# Patient Record
Sex: FEMALE | Race: WHITE | Employment: OTHER | URBAN - METROPOLITAN AREA
[De-identification: names, ages, dates, MRNs, and addresses within clinical notes are randomized per-mention and may not be internally consistent; named-entity substitution may affect disease eponyms.]

---

## 2017-01-04 RX ORDER — INSULIN LISPRO 100 [IU]/ML
INJECTION, SOLUTION INTRAVENOUS; SUBCUTANEOUS
Qty: 15 ML | Refills: 0 | Status: SHIPPED | OUTPATIENT
Start: 2017-01-04 | End: 2017-02-07 | Stop reason: SDUPTHER

## 2017-01-09 ENCOUNTER — TELEPHONE (OUTPATIENT)
Dept: INTERNAL MEDICINE | Age: 77
End: 2017-01-09

## 2017-01-10 ENCOUNTER — CARE COORDINATION (OUTPATIENT)
Dept: CARE COORDINATION | Age: 77
End: 2017-01-10

## 2017-01-13 RX ORDER — INSULIN GLARGINE 100 [IU]/ML
INJECTION, SOLUTION SUBCUTANEOUS
Qty: 10 ML | Refills: 0 | Status: SHIPPED | OUTPATIENT
Start: 2017-01-13 | End: 2017-02-07 | Stop reason: SDUPTHER

## 2017-01-16 ENCOUNTER — OFFICE VISIT (OUTPATIENT)
Dept: INTERNAL MEDICINE | Age: 77
End: 2017-01-16

## 2017-01-16 ENCOUNTER — CARE COORDINATOR VISIT (OUTPATIENT)
Dept: CARE COORDINATION | Age: 77
End: 2017-01-16

## 2017-01-16 VITALS
HEART RATE: 88 BPM | SYSTOLIC BLOOD PRESSURE: 130 MMHG | DIASTOLIC BLOOD PRESSURE: 74 MMHG | TEMPERATURE: 97.9 F | WEIGHT: 182 LBS | BODY MASS INDEX: 29.25 KG/M2 | OXYGEN SATURATION: 98 % | HEIGHT: 66 IN

## 2017-01-16 DIAGNOSIS — L02.11 NECK ABSCESS: ICD-10-CM

## 2017-01-16 PROCEDURE — 99213 OFFICE O/P EST LOW 20 MIN: CPT | Performed by: INTERNAL MEDICINE

## 2017-01-16 PROCEDURE — G8484 FLU IMMUNIZE NO ADMIN: HCPCS | Performed by: INTERNAL MEDICINE

## 2017-01-16 PROCEDURE — G8427 DOCREV CUR MEDS BY ELIG CLIN: HCPCS | Performed by: INTERNAL MEDICINE

## 2017-01-16 PROCEDURE — 1123F ACP DISCUSS/DSCN MKR DOCD: CPT | Performed by: INTERNAL MEDICINE

## 2017-01-16 PROCEDURE — 4040F PNEUMOC VAC/ADMIN/RCVD: CPT | Performed by: INTERNAL MEDICINE

## 2017-01-16 PROCEDURE — G8420 CALC BMI NORM PARAMETERS: HCPCS | Performed by: INTERNAL MEDICINE

## 2017-01-16 PROCEDURE — G8400 PT W/DXA NO RESULTS DOC: HCPCS | Performed by: INTERNAL MEDICINE

## 2017-01-16 PROCEDURE — 1036F TOBACCO NON-USER: CPT | Performed by: INTERNAL MEDICINE

## 2017-01-16 PROCEDURE — 1090F PRES/ABSN URINE INCON ASSESS: CPT | Performed by: INTERNAL MEDICINE

## 2017-01-16 RX ORDER — LISINOPRIL 20 MG/1
TABLET ORAL
Qty: 30 TABLET | Refills: 2 | Status: SHIPPED | OUTPATIENT
Start: 2017-01-16 | End: 2017-04-16 | Stop reason: SDUPTHER

## 2017-01-16 ASSESSMENT — ENCOUNTER SYMPTOMS
CHEST TIGHTNESS: 0
WHEEZING: 0
GASTROINTESTINAL NEGATIVE: 1
SHORTNESS OF BREATH: 0
RESPIRATORY NEGATIVE: 1

## 2017-01-24 ENCOUNTER — CARE COORDINATION (OUTPATIENT)
Dept: CARE COORDINATION | Age: 77
End: 2017-01-24

## 2017-01-31 ENCOUNTER — CARE COORDINATION (OUTPATIENT)
Dept: CARE COORDINATION | Age: 77
End: 2017-01-31

## 2017-02-07 RX ORDER — INSULIN LISPRO 100 [IU]/ML
INJECTION, SOLUTION INTRAVENOUS; SUBCUTANEOUS
Qty: 15 ML | Refills: 0 | Status: ON HOLD | OUTPATIENT
Start: 2017-02-07 | End: 2017-02-23

## 2017-02-07 RX ORDER — INSULIN GLARGINE 100 [IU]/ML
INJECTION, SOLUTION SUBCUTANEOUS
Qty: 10 ML | Refills: 0 | Status: ON HOLD | OUTPATIENT
Start: 2017-02-07 | End: 2017-02-23

## 2017-02-13 ENCOUNTER — TELEPHONE (OUTPATIENT)
Dept: INTERNAL MEDICINE | Age: 77
End: 2017-02-13

## 2017-02-16 ENCOUNTER — TELEPHONE (OUTPATIENT)
Dept: INTERNAL MEDICINE | Age: 77
End: 2017-02-16

## 2017-02-20 ENCOUNTER — TELEPHONE (OUTPATIENT)
Dept: INTERNAL MEDICINE | Age: 77
End: 2017-02-20

## 2017-02-22 PROBLEM — E11.10 DKA (DIABETIC KETOACIDOSES): Status: ACTIVE | Noted: 2017-02-22

## 2017-03-01 ENCOUNTER — CARE COORDINATION (OUTPATIENT)
Dept: OTHER | Facility: CLINIC | Age: 77
End: 2017-03-01

## 2017-03-02 RX ORDER — INSULIN GLARGINE 100 [IU]/ML
INJECTION, SOLUTION SUBCUTANEOUS
Qty: 10 ML | Refills: 0 | Status: SHIPPED | OUTPATIENT
Start: 2017-03-02 | End: 2017-03-24 | Stop reason: SDUPTHER

## 2017-03-07 RX ORDER — INSULIN LISPRO 100 [IU]/ML
INJECTION, SOLUTION INTRAVENOUS; SUBCUTANEOUS
Qty: 15 ML | Refills: 0 | Status: SHIPPED | OUTPATIENT
Start: 2017-03-07 | End: 2017-04-03 | Stop reason: SDUPTHER

## 2017-03-09 ENCOUNTER — TELEPHONE (OUTPATIENT)
Dept: INTERNAL MEDICINE | Age: 77
End: 2017-03-09

## 2017-03-09 ENCOUNTER — CARE COORDINATION (OUTPATIENT)
Dept: CASE MANAGEMENT | Age: 77
End: 2017-03-09

## 2017-03-10 ENCOUNTER — CARE COORDINATION (OUTPATIENT)
Dept: CASE MANAGEMENT | Age: 77
End: 2017-03-10

## 2017-03-10 ENCOUNTER — TELEPHONE (OUTPATIENT)
Dept: INTERNAL MEDICINE | Age: 77
End: 2017-03-10

## 2017-03-13 ENCOUNTER — CARE COORDINATION (OUTPATIENT)
Dept: CASE MANAGEMENT | Age: 77
End: 2017-03-13

## 2017-03-16 ENCOUNTER — OFFICE VISIT (OUTPATIENT)
Dept: INTERNAL MEDICINE | Age: 77
End: 2017-03-16

## 2017-03-16 VITALS
OXYGEN SATURATION: 98 % | BODY MASS INDEX: 29.54 KG/M2 | WEIGHT: 183 LBS | SYSTOLIC BLOOD PRESSURE: 122 MMHG | DIASTOLIC BLOOD PRESSURE: 80 MMHG | HEART RATE: 97 BPM

## 2017-03-16 DIAGNOSIS — E03.4 HYPOTHYROIDISM DUE TO ACQUIRED ATROPHY OF THYROID: ICD-10-CM

## 2017-03-16 DIAGNOSIS — E11.9 TYPE 2 DIABETES MELLITUS WITHOUT COMPLICATION, WITH LONG-TERM CURRENT USE OF INSULIN (HCC): ICD-10-CM

## 2017-03-16 DIAGNOSIS — Z79.4 TYPE 2 DIABETES MELLITUS WITHOUT COMPLICATION, WITH LONG-TERM CURRENT USE OF INSULIN (HCC): ICD-10-CM

## 2017-03-16 DIAGNOSIS — I10 ESSENTIAL HYPERTENSION: ICD-10-CM

## 2017-03-16 PROBLEM — E11.10 DKA (DIABETIC KETOACIDOSES): Status: RESOLVED | Noted: 2017-02-22 | Resolved: 2017-03-16

## 2017-03-16 PROBLEM — L02.11 NECK ABSCESS: Status: RESOLVED | Noted: 2017-01-16 | Resolved: 2017-03-16

## 2017-03-16 PROCEDURE — 1123F ACP DISCUSS/DSCN MKR DOCD: CPT | Performed by: INTERNAL MEDICINE

## 2017-03-16 PROCEDURE — 1111F DSCHRG MED/CURRENT MED MERGE: CPT | Performed by: INTERNAL MEDICINE

## 2017-03-16 PROCEDURE — G8400 PT W/DXA NO RESULTS DOC: HCPCS | Performed by: INTERNAL MEDICINE

## 2017-03-16 PROCEDURE — 4040F PNEUMOC VAC/ADMIN/RCVD: CPT | Performed by: INTERNAL MEDICINE

## 2017-03-16 PROCEDURE — G8420 CALC BMI NORM PARAMETERS: HCPCS | Performed by: INTERNAL MEDICINE

## 2017-03-16 PROCEDURE — 1036F TOBACCO NON-USER: CPT | Performed by: INTERNAL MEDICINE

## 2017-03-16 PROCEDURE — 1090F PRES/ABSN URINE INCON ASSESS: CPT | Performed by: INTERNAL MEDICINE

## 2017-03-16 PROCEDURE — 99214 OFFICE O/P EST MOD 30 MIN: CPT | Performed by: INTERNAL MEDICINE

## 2017-03-16 PROCEDURE — G8427 DOCREV CUR MEDS BY ELIG CLIN: HCPCS | Performed by: INTERNAL MEDICINE

## 2017-03-16 PROCEDURE — G8484 FLU IMMUNIZE NO ADMIN: HCPCS | Performed by: INTERNAL MEDICINE

## 2017-03-16 ASSESSMENT — ENCOUNTER SYMPTOMS
CHEST TIGHTNESS: 0
RESPIRATORY NEGATIVE: 1
SHORTNESS OF BREATH: 0
WHEEZING: 0
GASTROINTESTINAL NEGATIVE: 1

## 2017-03-21 ENCOUNTER — CARE COORDINATION (OUTPATIENT)
Dept: CARE COORDINATION | Age: 77
End: 2017-03-21

## 2017-03-21 ENCOUNTER — CARE COORDINATION (OUTPATIENT)
Dept: CASE MANAGEMENT | Age: 77
End: 2017-03-21

## 2017-03-24 RX ORDER — OMEPRAZOLE 40 MG/1
CAPSULE, DELAYED RELEASE ORAL
Qty: 180 CAPSULE | Refills: 0 | Status: SHIPPED | OUTPATIENT
Start: 2017-03-24 | End: 2017-06-19 | Stop reason: SDUPTHER

## 2017-03-24 RX ORDER — INSULIN GLARGINE 100 [IU]/ML
INJECTION, SOLUTION SUBCUTANEOUS
Qty: 10 ML | Refills: 0 | Status: SHIPPED | OUTPATIENT
Start: 2017-03-24 | End: 2017-04-15 | Stop reason: SDUPTHER

## 2017-03-28 ENCOUNTER — CARE COORDINATION (OUTPATIENT)
Dept: CARE COORDINATION | Age: 77
End: 2017-03-28

## 2017-03-31 ENCOUNTER — TELEPHONE (OUTPATIENT)
Dept: INTERNAL MEDICINE | Age: 77
End: 2017-03-31

## 2017-04-03 ENCOUNTER — CARE COORDINATOR VISIT (OUTPATIENT)
Dept: CARE COORDINATION | Age: 77
End: 2017-04-03

## 2017-04-03 RX ORDER — INSULIN LISPRO 100 [IU]/ML
INJECTION, SOLUTION INTRAVENOUS; SUBCUTANEOUS
Qty: 15 ML | Refills: 0 | Status: SHIPPED | OUTPATIENT
Start: 2017-04-03 | End: 2017-05-02 | Stop reason: SDUPTHER

## 2017-04-11 ENCOUNTER — CARE COORDINATION (OUTPATIENT)
Dept: CARE COORDINATION | Age: 77
End: 2017-04-11

## 2017-04-17 ENCOUNTER — TELEPHONE (OUTPATIENT)
Dept: INTERNAL MEDICINE | Age: 77
End: 2017-04-17

## 2017-04-17 ENCOUNTER — OFFICE VISIT (OUTPATIENT)
Dept: INTERNAL MEDICINE | Age: 77
End: 2017-04-17

## 2017-04-17 VITALS
WEIGHT: 186 LBS | SYSTOLIC BLOOD PRESSURE: 132 MMHG | BODY MASS INDEX: 30.02 KG/M2 | DIASTOLIC BLOOD PRESSURE: 74 MMHG | HEART RATE: 74 BPM | OXYGEN SATURATION: 98 %

## 2017-04-17 DIAGNOSIS — I10 ESSENTIAL HYPERTENSION: ICD-10-CM

## 2017-04-17 DIAGNOSIS — E03.4 HYPOTHYROIDISM DUE TO ACQUIRED ATROPHY OF THYROID: ICD-10-CM

## 2017-04-17 DIAGNOSIS — E11.9 TYPE 2 DIABETES MELLITUS WITHOUT COMPLICATION, WITH LONG-TERM CURRENT USE OF INSULIN (HCC): ICD-10-CM

## 2017-04-17 DIAGNOSIS — R29.6 RECURRENT FALLS: ICD-10-CM

## 2017-04-17 DIAGNOSIS — E78.2 MIXED HYPERLIPIDEMIA: ICD-10-CM

## 2017-04-17 DIAGNOSIS — Z79.4 TYPE 2 DIABETES MELLITUS WITHOUT COMPLICATION, WITH LONG-TERM CURRENT USE OF INSULIN (HCC): ICD-10-CM

## 2017-04-17 PROCEDURE — G8400 PT W/DXA NO RESULTS DOC: HCPCS | Performed by: INTERNAL MEDICINE

## 2017-04-17 PROCEDURE — 99214 OFFICE O/P EST MOD 30 MIN: CPT | Performed by: INTERNAL MEDICINE

## 2017-04-17 PROCEDURE — G8417 CALC BMI ABV UP PARAM F/U: HCPCS | Performed by: INTERNAL MEDICINE

## 2017-04-17 PROCEDURE — 4040F PNEUMOC VAC/ADMIN/RCVD: CPT | Performed by: INTERNAL MEDICINE

## 2017-04-17 PROCEDURE — 1090F PRES/ABSN URINE INCON ASSESS: CPT | Performed by: INTERNAL MEDICINE

## 2017-04-17 PROCEDURE — 1036F TOBACCO NON-USER: CPT | Performed by: INTERNAL MEDICINE

## 2017-04-17 PROCEDURE — G8427 DOCREV CUR MEDS BY ELIG CLIN: HCPCS | Performed by: INTERNAL MEDICINE

## 2017-04-17 PROCEDURE — 1123F ACP DISCUSS/DSCN MKR DOCD: CPT | Performed by: INTERNAL MEDICINE

## 2017-04-17 RX ORDER — LISINOPRIL 20 MG/1
TABLET ORAL
Qty: 30 TABLET | Refills: 0 | Status: SHIPPED | OUTPATIENT
Start: 2017-04-17 | End: 2017-05-14 | Stop reason: SDUPTHER

## 2017-04-17 RX ORDER — INSULIN GLARGINE 100 [IU]/ML
INJECTION, SOLUTION SUBCUTANEOUS
Qty: 10 ML | Refills: 0 | Status: SHIPPED | OUTPATIENT
Start: 2017-04-17 | End: 2017-05-09 | Stop reason: SDUPTHER

## 2017-04-17 RX ORDER — CITALOPRAM 40 MG/1
TABLET ORAL
Qty: 30 TABLET | Refills: 3 | Status: SHIPPED | OUTPATIENT
Start: 2017-04-17 | End: 2017-04-17

## 2017-04-17 RX ORDER — CITALOPRAM 40 MG/1
TABLET ORAL
Qty: 30 TABLET | Refills: 0 | Status: SHIPPED | OUTPATIENT
Start: 2017-04-17 | End: 2017-07-19 | Stop reason: SDUPTHER

## 2017-04-17 ASSESSMENT — ENCOUNTER SYMPTOMS
RESPIRATORY NEGATIVE: 1
SHORTNESS OF BREATH: 0
WHEEZING: 0
CHEST TIGHTNESS: 0
GASTROINTESTINAL NEGATIVE: 1

## 2017-04-25 ENCOUNTER — CARE COORDINATION (OUTPATIENT)
Dept: CARE COORDINATION | Age: 77
End: 2017-04-25

## 2017-05-02 RX ORDER — INSULIN LISPRO 100 [IU]/ML
INJECTION, SOLUTION INTRAVENOUS; SUBCUTANEOUS
Qty: 15 ML | Refills: 0 | Status: SHIPPED | OUTPATIENT
Start: 2017-05-02 | End: 2017-05-29 | Stop reason: SDUPTHER

## 2017-05-08 RX ORDER — PRAVASTATIN SODIUM 80 MG/1
TABLET ORAL
Qty: 90 TABLET | Refills: 0 | Status: SHIPPED | OUTPATIENT
Start: 2017-05-08 | End: 2018-02-01 | Stop reason: SDUPTHER

## 2017-05-09 RX ORDER — INSULIN GLARGINE 100 [IU]/ML
INJECTION, SOLUTION SUBCUTANEOUS
Qty: 10 ML | Refills: 0 | Status: SHIPPED | OUTPATIENT
Start: 2017-05-09 | End: 2017-06-03 | Stop reason: SDUPTHER

## 2017-05-15 RX ORDER — LISINOPRIL 20 MG/1
TABLET ORAL
Qty: 30 TABLET | Refills: 2 | Status: SHIPPED | OUTPATIENT
Start: 2017-05-15 | End: 2017-12-21 | Stop reason: SDUPTHER

## 2017-05-17 ENCOUNTER — OFFICE VISIT (OUTPATIENT)
Dept: INTERNAL MEDICINE | Age: 77
End: 2017-05-17

## 2017-05-17 ENCOUNTER — CARE COORDINATOR VISIT (OUTPATIENT)
Dept: CARE COORDINATION | Age: 77
End: 2017-05-17

## 2017-05-17 VITALS
DIASTOLIC BLOOD PRESSURE: 70 MMHG | WEIGHT: 184 LBS | HEART RATE: 84 BPM | BODY MASS INDEX: 29.7 KG/M2 | OXYGEN SATURATION: 96 % | SYSTOLIC BLOOD PRESSURE: 136 MMHG

## 2017-05-17 DIAGNOSIS — F34.1 DYSTHYMIA: ICD-10-CM

## 2017-05-17 DIAGNOSIS — E03.4 HYPOTHYROIDISM DUE TO ACQUIRED ATROPHY OF THYROID: ICD-10-CM

## 2017-05-17 DIAGNOSIS — I10 ESSENTIAL HYPERTENSION: ICD-10-CM

## 2017-05-17 DIAGNOSIS — Z79.4 TYPE 2 DIABETES MELLITUS WITHOUT COMPLICATION, WITH LONG-TERM CURRENT USE OF INSULIN (HCC): ICD-10-CM

## 2017-05-17 DIAGNOSIS — E11.9 TYPE 2 DIABETES MELLITUS WITHOUT COMPLICATION, WITH LONG-TERM CURRENT USE OF INSULIN (HCC): ICD-10-CM

## 2017-05-17 LAB — HBA1C MFR BLD: 9.5 %

## 2017-05-17 PROCEDURE — 4040F PNEUMOC VAC/ADMIN/RCVD: CPT | Performed by: INTERNAL MEDICINE

## 2017-05-17 PROCEDURE — G8420 CALC BMI NORM PARAMETERS: HCPCS | Performed by: INTERNAL MEDICINE

## 2017-05-17 PROCEDURE — G8427 DOCREV CUR MEDS BY ELIG CLIN: HCPCS | Performed by: INTERNAL MEDICINE

## 2017-05-17 PROCEDURE — 1123F ACP DISCUSS/DSCN MKR DOCD: CPT | Performed by: INTERNAL MEDICINE

## 2017-05-17 PROCEDURE — 1090F PRES/ABSN URINE INCON ASSESS: CPT | Performed by: INTERNAL MEDICINE

## 2017-05-17 PROCEDURE — 99214 OFFICE O/P EST MOD 30 MIN: CPT | Performed by: INTERNAL MEDICINE

## 2017-05-17 PROCEDURE — G8400 PT W/DXA NO RESULTS DOC: HCPCS | Performed by: INTERNAL MEDICINE

## 2017-05-17 PROCEDURE — 1036F TOBACCO NON-USER: CPT | Performed by: INTERNAL MEDICINE

## 2017-05-17 PROCEDURE — 83036 HEMOGLOBIN GLYCOSYLATED A1C: CPT | Performed by: INTERNAL MEDICINE

## 2017-05-17 ASSESSMENT — ENCOUNTER SYMPTOMS
CHEST TIGHTNESS: 0
RESPIRATORY NEGATIVE: 1
GASTROINTESTINAL NEGATIVE: 1
SHORTNESS OF BREATH: 0
WHEEZING: 0

## 2017-05-30 RX ORDER — INSULIN LISPRO 100 [IU]/ML
INJECTION, SOLUTION INTRAVENOUS; SUBCUTANEOUS
Qty: 15 ML | Refills: 0 | Status: SHIPPED | OUTPATIENT
Start: 2017-05-30 | End: 2017-06-27 | Stop reason: SDUPTHER

## 2017-06-05 RX ORDER — INSULIN GLARGINE 100 [IU]/ML
INJECTION, SOLUTION SUBCUTANEOUS
Qty: 10 ML | Refills: 0 | Status: SHIPPED | OUTPATIENT
Start: 2017-06-05 | End: 2017-06-27 | Stop reason: SDUPTHER

## 2017-06-19 RX ORDER — OMEPRAZOLE 40 MG/1
CAPSULE, DELAYED RELEASE ORAL
Qty: 180 CAPSULE | Refills: 0 | Status: SHIPPED | OUTPATIENT
Start: 2017-06-19 | End: 2017-11-21 | Stop reason: CLARIF

## 2017-06-27 RX ORDER — INSULIN LISPRO 100 [IU]/ML
INJECTION, SOLUTION INTRAVENOUS; SUBCUTANEOUS
Qty: 15 ML | Refills: 0 | Status: SHIPPED | OUTPATIENT
Start: 2017-06-27 | End: 2017-11-21 | Stop reason: CLARIF

## 2017-06-27 RX ORDER — INSULIN GLARGINE 100 [IU]/ML
INJECTION, SOLUTION SUBCUTANEOUS
Qty: 10 ML | Refills: 0 | Status: SHIPPED | OUTPATIENT
Start: 2017-06-27 | End: 2017-07-19 | Stop reason: DRUGHIGH

## 2017-07-03 ENCOUNTER — CARE COORDINATION (OUTPATIENT)
Dept: CARE COORDINATION | Age: 77
End: 2017-07-03

## 2017-07-19 ENCOUNTER — OFFICE VISIT (OUTPATIENT)
Dept: INTERNAL MEDICINE | Age: 77
End: 2017-07-19

## 2017-07-19 VITALS
BODY MASS INDEX: 31.31 KG/M2 | DIASTOLIC BLOOD PRESSURE: 72 MMHG | WEIGHT: 194 LBS | SYSTOLIC BLOOD PRESSURE: 130 MMHG | OXYGEN SATURATION: 97 % | HEART RATE: 72 BPM

## 2017-07-19 DIAGNOSIS — I10 ESSENTIAL HYPERTENSION: ICD-10-CM

## 2017-07-19 DIAGNOSIS — Z79.4 TYPE 2 DIABETES MELLITUS WITHOUT COMPLICATION, WITH LONG-TERM CURRENT USE OF INSULIN (HCC): Primary | ICD-10-CM

## 2017-07-19 DIAGNOSIS — E78.2 MIXED HYPERLIPIDEMIA: ICD-10-CM

## 2017-07-19 DIAGNOSIS — E11.9 TYPE 2 DIABETES MELLITUS WITHOUT COMPLICATION, WITH LONG-TERM CURRENT USE OF INSULIN (HCC): Primary | ICD-10-CM

## 2017-07-19 DIAGNOSIS — E03.4 HYPOTHYROIDISM DUE TO ACQUIRED ATROPHY OF THYROID: ICD-10-CM

## 2017-07-19 LAB — HBA1C MFR BLD: 11.5 %

## 2017-07-19 PROCEDURE — 1036F TOBACCO NON-USER: CPT | Performed by: INTERNAL MEDICINE

## 2017-07-19 PROCEDURE — 1123F ACP DISCUSS/DSCN MKR DOCD: CPT | Performed by: INTERNAL MEDICINE

## 2017-07-19 PROCEDURE — 1090F PRES/ABSN URINE INCON ASSESS: CPT | Performed by: INTERNAL MEDICINE

## 2017-07-19 PROCEDURE — G8417 CALC BMI ABV UP PARAM F/U: HCPCS | Performed by: INTERNAL MEDICINE

## 2017-07-19 PROCEDURE — G8427 DOCREV CUR MEDS BY ELIG CLIN: HCPCS | Performed by: INTERNAL MEDICINE

## 2017-07-19 PROCEDURE — 4040F PNEUMOC VAC/ADMIN/RCVD: CPT | Performed by: INTERNAL MEDICINE

## 2017-07-19 PROCEDURE — G8400 PT W/DXA NO RESULTS DOC: HCPCS | Performed by: INTERNAL MEDICINE

## 2017-07-19 PROCEDURE — 83036 HEMOGLOBIN GLYCOSYLATED A1C: CPT | Performed by: INTERNAL MEDICINE

## 2017-07-19 PROCEDURE — 99214 OFFICE O/P EST MOD 30 MIN: CPT | Performed by: INTERNAL MEDICINE

## 2017-07-19 RX ORDER — CITALOPRAM 40 MG/1
TABLET ORAL
Qty: 30 TABLET | Refills: 3 | Status: SHIPPED | OUTPATIENT
Start: 2017-07-19 | End: 2017-09-19 | Stop reason: ALTCHOICE

## 2017-07-19 RX ORDER — INSULIN GLARGINE 100 [IU]/ML
INJECTION, SOLUTION SUBCUTANEOUS
Qty: 10 ML | Refills: 0 | Status: SHIPPED | OUTPATIENT
Start: 2017-07-19 | End: 2018-02-08 | Stop reason: SDUPTHER

## 2017-07-19 ASSESSMENT — ENCOUNTER SYMPTOMS
CHEST TIGHTNESS: 0
RESPIRATORY NEGATIVE: 1
SHORTNESS OF BREATH: 0
GASTROINTESTINAL NEGATIVE: 1
WHEEZING: 0

## 2017-07-20 ENCOUNTER — CARE COORDINATION (OUTPATIENT)
Dept: CARE COORDINATION | Age: 77
End: 2017-07-20

## 2017-08-02 ENCOUNTER — TELEPHONE (OUTPATIENT)
Dept: INTERNAL MEDICINE | Age: 77
End: 2017-08-02

## 2017-08-03 ENCOUNTER — TELEPHONE (OUTPATIENT)
Dept: INTERNAL MEDICINE | Age: 77
End: 2017-08-03

## 2017-08-22 ENCOUNTER — CARE COORDINATION (OUTPATIENT)
Dept: CARE COORDINATION | Age: 77
End: 2017-08-22

## 2017-08-30 ENCOUNTER — TELEPHONE (OUTPATIENT)
Dept: INTERNAL MEDICINE | Age: 77
End: 2017-08-30

## 2017-09-06 ENCOUNTER — CARE COORDINATION (OUTPATIENT)
Dept: CARE COORDINATION | Age: 77
End: 2017-09-06

## 2017-09-11 ENCOUNTER — OFFICE VISIT (OUTPATIENT)
Dept: INTERNAL MEDICINE | Age: 77
End: 2017-09-11

## 2017-09-11 VITALS
HEART RATE: 90 BPM | BODY MASS INDEX: 30.37 KG/M2 | WEIGHT: 189 LBS | DIASTOLIC BLOOD PRESSURE: 68 MMHG | TEMPERATURE: 97.8 F | HEIGHT: 66 IN | SYSTOLIC BLOOD PRESSURE: 130 MMHG | OXYGEN SATURATION: 96 %

## 2017-09-11 DIAGNOSIS — N39.0 URINARY TRACT INFECTION WITHOUT HEMATURIA, SITE UNSPECIFIED: Primary | ICD-10-CM

## 2017-09-11 DIAGNOSIS — I10 ESSENTIAL HYPERTENSION: ICD-10-CM

## 2017-09-11 DIAGNOSIS — E03.4 HYPOTHYROIDISM DUE TO ACQUIRED ATROPHY OF THYROID: ICD-10-CM

## 2017-09-11 DIAGNOSIS — N30.00 ACUTE CYSTITIS WITHOUT HEMATURIA: ICD-10-CM

## 2017-09-11 LAB
BILIRUBIN, POC: NORMAL
BLOOD URINE, POC: NORMAL
CLARITY, POC: CLEAR
COLOR, POC: YELLOW
GLUCOSE URINE, POC: NORMAL
KETONES, POC: NORMAL
LEUKOCYTE EST, POC: NORMAL
NITRITE, POC: NORMAL
PH, POC: 8
PROTEIN, POC: NORMAL
SPECIFIC GRAVITY, POC: 1
UROBILINOGEN, POC: NORMAL

## 2017-09-11 PROCEDURE — 81002 URINALYSIS NONAUTO W/O SCOPE: CPT | Performed by: INTERNAL MEDICINE

## 2017-09-11 PROCEDURE — 1090F PRES/ABSN URINE INCON ASSESS: CPT | Performed by: INTERNAL MEDICINE

## 2017-09-11 PROCEDURE — G8427 DOCREV CUR MEDS BY ELIG CLIN: HCPCS | Performed by: INTERNAL MEDICINE

## 2017-09-11 PROCEDURE — 99213 OFFICE O/P EST LOW 20 MIN: CPT | Performed by: INTERNAL MEDICINE

## 2017-09-11 PROCEDURE — 4040F PNEUMOC VAC/ADMIN/RCVD: CPT | Performed by: INTERNAL MEDICINE

## 2017-09-11 PROCEDURE — 1123F ACP DISCUSS/DSCN MKR DOCD: CPT | Performed by: INTERNAL MEDICINE

## 2017-09-11 PROCEDURE — 1036F TOBACCO NON-USER: CPT | Performed by: INTERNAL MEDICINE

## 2017-09-11 PROCEDURE — G8400 PT W/DXA NO RESULTS DOC: HCPCS | Performed by: INTERNAL MEDICINE

## 2017-09-11 PROCEDURE — G8417 CALC BMI ABV UP PARAM F/U: HCPCS | Performed by: INTERNAL MEDICINE

## 2017-09-11 RX ORDER — SULFAMETHOXAZOLE AND TRIMETHOPRIM 800; 160 MG/1; MG/1
1 TABLET ORAL 2 TIMES DAILY
Qty: 14 TABLET | Refills: 0 | Status: SHIPPED | OUTPATIENT
Start: 2017-09-11 | End: 2017-09-18

## 2017-09-11 ASSESSMENT — ENCOUNTER SYMPTOMS
RESPIRATORY NEGATIVE: 1
SHORTNESS OF BREATH: 0
WHEEZING: 0
GASTROINTESTINAL NEGATIVE: 1
CHEST TIGHTNESS: 0

## 2017-09-13 ENCOUNTER — TELEPHONE (OUTPATIENT)
Dept: INTERNAL MEDICINE | Age: 77
End: 2017-09-13

## 2017-09-13 ENCOUNTER — CARE COORDINATION (OUTPATIENT)
Dept: CARE COORDINATION | Age: 77
End: 2017-09-13

## 2017-09-13 LAB — URINE CULTURE, ROUTINE: NORMAL

## 2017-09-18 ENCOUNTER — OFFICE VISIT (OUTPATIENT)
Dept: INTERNAL MEDICINE | Age: 77
End: 2017-09-18

## 2017-09-18 ENCOUNTER — CARE COORDINATION (OUTPATIENT)
Dept: CARE COORDINATION | Age: 77
End: 2017-09-18

## 2017-09-18 VITALS
SYSTOLIC BLOOD PRESSURE: 130 MMHG | HEART RATE: 74 BPM | HEIGHT: 68 IN | DIASTOLIC BLOOD PRESSURE: 72 MMHG | BODY MASS INDEX: 28.64 KG/M2 | WEIGHT: 189 LBS | OXYGEN SATURATION: 97 %

## 2017-09-18 DIAGNOSIS — R10.84 GENERALIZED ABDOMINAL PAIN: Primary | ICD-10-CM

## 2017-09-18 DIAGNOSIS — N30.00 ACUTE CYSTITIS WITHOUT HEMATURIA: ICD-10-CM

## 2017-09-18 DIAGNOSIS — E11.9 TYPE 2 DIABETES MELLITUS WITHOUT COMPLICATION, WITH LONG-TERM CURRENT USE OF INSULIN (HCC): ICD-10-CM

## 2017-09-18 DIAGNOSIS — Z79.4 TYPE 2 DIABETES MELLITUS WITHOUT COMPLICATION, WITH LONG-TERM CURRENT USE OF INSULIN (HCC): ICD-10-CM

## 2017-09-18 LAB
BILIRUBIN, POC: NORMAL
BLOOD URINE, POC: NORMAL
CLARITY, POC: CLEAR
COLOR, POC: YELLOW
GLUCOSE URINE, POC: NORMAL
KETONES, POC: NORMAL
LEUKOCYTE EST, POC: NORMAL
NITRITE, POC: NORMAL
PH, POC: 7
PROTEIN, POC: NORMAL
SPECIFIC GRAVITY, POC: 1.01
UROBILINOGEN, POC: NORMAL

## 2017-09-18 PROCEDURE — G8417 CALC BMI ABV UP PARAM F/U: HCPCS | Performed by: INTERNAL MEDICINE

## 2017-09-18 PROCEDURE — G8427 DOCREV CUR MEDS BY ELIG CLIN: HCPCS | Performed by: INTERNAL MEDICINE

## 2017-09-18 PROCEDURE — G8400 PT W/DXA NO RESULTS DOC: HCPCS | Performed by: INTERNAL MEDICINE

## 2017-09-18 PROCEDURE — 1123F ACP DISCUSS/DSCN MKR DOCD: CPT | Performed by: INTERNAL MEDICINE

## 2017-09-18 PROCEDURE — 4040F PNEUMOC VAC/ADMIN/RCVD: CPT | Performed by: INTERNAL MEDICINE

## 2017-09-18 PROCEDURE — 1090F PRES/ABSN URINE INCON ASSESS: CPT | Performed by: INTERNAL MEDICINE

## 2017-09-18 PROCEDURE — 1036F TOBACCO NON-USER: CPT | Performed by: INTERNAL MEDICINE

## 2017-09-18 PROCEDURE — 99214 OFFICE O/P EST MOD 30 MIN: CPT | Performed by: INTERNAL MEDICINE

## 2017-09-18 PROCEDURE — 81002 URINALYSIS NONAUTO W/O SCOPE: CPT | Performed by: INTERNAL MEDICINE

## 2017-09-18 ASSESSMENT — ENCOUNTER SYMPTOMS
SHORTNESS OF BREATH: 0
WHEEZING: 0
CHEST TIGHTNESS: 0
RESPIRATORY NEGATIVE: 1
GASTROINTESTINAL NEGATIVE: 1

## 2017-09-19 PROBLEM — N30.00 ACUTE CYSTITIS: Status: RESOLVED | Noted: 2017-09-11 | Resolved: 2017-09-19

## 2017-09-19 PROBLEM — R10.84 GENERALIZED ABDOMINAL PAIN: Status: ACTIVE | Noted: 2017-09-19

## 2017-09-20 ENCOUNTER — CARE COORDINATION (OUTPATIENT)
Dept: CARE COORDINATION | Age: 77
End: 2017-09-20

## 2017-09-21 ENCOUNTER — HOSPITAL ENCOUNTER (OUTPATIENT)
Dept: CT IMAGING | Age: 77
Discharge: HOME | End: 2017-09-21
Attending: INTERNAL MEDICINE | Admitting: INTERNAL MEDICINE

## 2017-09-21 DIAGNOSIS — R10.84 GENERALIZED ABDOMINAL PAIN: ICD-10-CM

## 2017-09-22 DIAGNOSIS — R10.9 ABDOMINAL PAIN, UNSPECIFIED LOCATION: Primary | ICD-10-CM

## 2017-09-25 ENCOUNTER — TELEPHONE (OUTPATIENT)
Dept: INTERNAL MEDICINE | Age: 77
End: 2017-09-25

## 2017-09-26 ENCOUNTER — TELEPHONE (OUTPATIENT)
Dept: INTERNAL MEDICINE | Age: 77
End: 2017-09-26

## 2017-09-26 ENCOUNTER — CARE COORDINATION (OUTPATIENT)
Dept: CARE COORDINATION | Age: 77
End: 2017-09-26

## 2017-09-26 RX ORDER — POLYETHYLENE GLYCOL 3350 17 G/17G
17 POWDER, FOR SOLUTION ORAL DAILY
Qty: 510 G | Refills: 0 | Status: SHIPPED | OUTPATIENT
Start: 2017-09-26 | End: 2017-10-26

## 2017-09-29 ENCOUNTER — TELEPHONE (OUTPATIENT)
Dept: INTERNAL MEDICINE | Age: 77
End: 2017-09-29

## 2017-09-29 ENCOUNTER — HOSPITAL ENCOUNTER (OUTPATIENT)
Dept: NUCLEAR MEDICINE | Age: 77
Discharge: HOME | End: 2017-09-29
Attending: INTERNAL MEDICINE | Admitting: INTERNAL MEDICINE

## 2017-09-29 DIAGNOSIS — R10.9 ABDOMINAL PAIN: ICD-10-CM

## 2017-09-29 DIAGNOSIS — R11.0 NAUSEA: Primary | ICD-10-CM

## 2017-09-29 DIAGNOSIS — R10.9 ABDOMINAL PAIN, UNSPECIFIED LOCATION: ICD-10-CM

## 2017-09-29 RX ORDER — PROMETHAZINE HYDROCHLORIDE 25 MG/1
12.5 TABLET ORAL EVERY 8 HOURS PRN
Qty: 10 TABLET | Refills: 0 | Status: SHIPPED | OUTPATIENT
Start: 2017-09-29 | End: 2017-10-06 | Stop reason: SDUPTHER

## 2017-09-29 RX ADMIN — Medication 6 MILLICURIE: at 12:30

## 2017-10-02 ENCOUNTER — TELEPHONE (OUTPATIENT)
Dept: INTERNAL MEDICINE | Age: 77
End: 2017-10-02

## 2017-10-02 NOTE — TELEPHONE ENCOUNTER
Pt would like to know if the scan results are available   She had this done this past Friday at [facility]

## 2017-10-03 ENCOUNTER — TELEPHONE (OUTPATIENT)
Dept: INTERNAL MEDICINE | Age: 77
End: 2017-10-03

## 2017-10-04 ENCOUNTER — CARE COORDINATION (OUTPATIENT)
Dept: CARE COORDINATION | Age: 77
End: 2017-10-04

## 2017-10-06 ENCOUNTER — TELEPHONE (OUTPATIENT)
Dept: INTERNAL MEDICINE | Age: 77
End: 2017-10-06

## 2017-10-06 DIAGNOSIS — R11.0 NAUSEA: ICD-10-CM

## 2017-10-06 RX ORDER — PROMETHAZINE HYDROCHLORIDE 25 MG/1
12.5 TABLET ORAL EVERY 8 HOURS PRN
Qty: 10 TABLET | Refills: 0 | Status: SHIPPED | OUTPATIENT
Start: 2017-10-06 | End: 2017-10-11 | Stop reason: SDUPTHER

## 2017-10-06 NOTE — TELEPHONE ENCOUNTER
Patient is miserable. Doesn't feel well at all. Patient wants something for the Nausea. She wants to be able to sleep.

## 2017-10-09 ENCOUNTER — OFFICE VISIT (OUTPATIENT)
Dept: SURGERY | Age: 77
End: 2017-10-09

## 2017-10-09 VITALS
SYSTOLIC BLOOD PRESSURE: 159 MMHG | WEIGHT: 193 LBS | BODY MASS INDEX: 31.02 KG/M2 | DIASTOLIC BLOOD PRESSURE: 90 MMHG | HEIGHT: 66 IN | TEMPERATURE: 98.1 F

## 2017-10-09 DIAGNOSIS — K82.8 BILIARY DYSKINESIA: Primary | ICD-10-CM

## 2017-10-09 PROCEDURE — G8484 FLU IMMUNIZE NO ADMIN: HCPCS | Performed by: SURGERY

## 2017-10-09 PROCEDURE — 99203 OFFICE O/P NEW LOW 30 MIN: CPT | Performed by: SURGERY

## 2017-10-09 PROCEDURE — 1036F TOBACCO NON-USER: CPT | Performed by: SURGERY

## 2017-10-09 PROCEDURE — 4040F PNEUMOC VAC/ADMIN/RCVD: CPT | Performed by: SURGERY

## 2017-10-09 PROCEDURE — G8417 CALC BMI ABV UP PARAM F/U: HCPCS | Performed by: SURGERY

## 2017-10-09 PROCEDURE — G8427 DOCREV CUR MEDS BY ELIG CLIN: HCPCS | Performed by: SURGERY

## 2017-10-09 PROCEDURE — 1090F PRES/ABSN URINE INCON ASSESS: CPT | Performed by: SURGERY

## 2017-10-09 NOTE — PROGRESS NOTES
of greater than   35 percent. U/S  The liver is unremarkable. No focal lesions. There are tiny   gallstones within the gallbladder. No gallbladder wall thickening. Gallbladder wall measures 2.5 mm. No tenderness with probe.       Common bile duct is normal and measures 5 mm. IMPRESSION/RECOMMENDATIONS:    The patient has findings consistent with symptomatic biliary dyskinesia. As a result, we will proceed with laparoscopic cholecystectomy. The risks, benefits, and expected recovery were discussed with the patient and he wishes to proceed.      Omar Pino

## 2017-10-09 NOTE — LETTER
[facility] of [address] [phone]   [phone]   Aneta Howell MD FACS                                     SURGERY ORDER   -- Time of order -- 10/9/17    3:21 PM    Facility:   [address] [place] [address].  # _________________                                  Scheduled by: ____________    Surgery Date & Time:                                              Nuc Med / Inj. - or - Needle/Seed Loc:                                            Pt arrival:      Patient Name:  Matilda Quinonez     :  1940 PCP:  Valeriano Taylor MD       Home Ph:    [phone] (home) [phone] (work)                                                    PROCEDURE:  Laparoscopic cholecystectomy with gram    DIAGNOSIS:  Biliary Dyskinesia    Anesthesia: _General  Time Needed:  1 hour   Pt Position:  supine         Outpatient __x__ Admit ______  Assist._____  Pre-Op H & P to be done by: ___      Labs Needed:   CBC ___  PT/PTT___ INR ____  CMP ___ EKG ___   Urine Hcg ___    Cardiac Clearance ___  (if Delwin Pillion to be done by) __________________    Patient to meet with Anesthesiology prior to surgery ___no__     Medications to be stopped 5 days before surgery: _________  Additional / Special Orders:     **Ancef 2 gm IV OCTOR   (If patient weight is > 120 kg, give 3 gm IV OCTOR)                                                                                                            Aneta Howell MD [address] [place] [address]   Fax   [phone]            Date Of Procedure:    PATIENT:       Matilda Quinonez                    :  1940        Allergies   Allergen Reactions    Lipitor [Atorvastatin]     Nickel Rash     Jewelry & foods some 20+ yrs ago, but eats everything now without return of rash    Oxycodone-Acetaminophen Nausea And Vomiting     Patient states \"oxycodone is okay\" for her to take       No.   PHYSICIAN ORDERS 3. You should not drive, sign any important documents or make any important decisions until you have fully recovered from anesthesia (approximately 24  48 hours) AND are off all narcotic pain medications. · Any paperwork needing completion for either your employer or insurance company can be faxed, mailed or dropped off at our office to my attention. Please allow 7 business days for the paperwork to be completed. You will be contacted once it has been completed at which time you will be able to pick it up or have it mailed. · NOTE: Due to HIPPA policy, completed paperwork can not be faxed and per CONNIE HASKINS [facility] of 5-9-03, Fees for form completion may apply. If you have any questions, please feel free to call me at the number above.

## 2017-10-09 NOTE — LETTER
The [employer] of [address] [place] [address] [employer]  [place] [address] [id number]  Phone: [phone]  Fax: [phone]    Radha Eddy MD        October 9, 2017     Vivian Louis, [address] [place] [address]    Patient: Devorah Almonte  MR Number: [id number]  YOB: 1940  Date of Visit: 10/9/2017    Dear Dr. Vivian Louis: Thank you for the request for consultation for Jf Ac to me for the evaluation of nausea. Below are the relevant portions of my assessment and plan of care. The patient has findings consistent with symptomatic biliary dyskinesia. As a result, we will proceed with laparoscopic cholecystectomy. If you have questions, please do not hesitate to call me. I look forward to following Sal Rice along with you.     Sincerely,        Radha Eddy MD

## 2017-10-10 ENCOUNTER — TELEPHONE (OUTPATIENT)
Dept: SURGERY | Age: 77
End: 2017-10-10

## 2017-10-10 NOTE — TELEPHONE ENCOUNTER
Brielle RN coordinator called to see if Pt could get scheduled for surgery asap. Pt is having blood sugar issues.

## 2017-10-11 ENCOUNTER — TELEPHONE (OUTPATIENT)
Dept: INTERNAL MEDICINE | Age: 77
End: 2017-10-11

## 2017-10-11 DIAGNOSIS — R11.0 NAUSEA: ICD-10-CM

## 2017-10-11 RX ORDER — PROMETHAZINE HYDROCHLORIDE 25 MG/1
12.5 TABLET ORAL EVERY 8 HOURS PRN
Qty: 10 TABLET | Refills: 0 | Status: ON HOLD | OUTPATIENT
Start: 2017-10-11 | End: 2017-10-18

## 2017-10-11 NOTE — TELEPHONE ENCOUNTER
Patient needs nausea medication called in until she goes for her Gallbladder removal surgery on Tuesday. Can something be called in for her?

## 2017-10-11 NOTE — PROGRESS NOTES
The [facility] [employer] / [facility]) [address] [facility], [address]    Acknowledgment of Informed Consent for Surgical or Medical Procedure and Sedation  I agree to allow doctor(s) DAPHNIE MENSAH and his/her associates or assistants, including residents and/or other qualified medical practitioner to perform the following medical treatment or procedure and to administer or direct the administration of sedation as necessary:  Procedure(s): LAPAROSCOPIC CHOLECYSTECTOMY WITH CHOLANGIOGRAM  My doctor has explained the following regarding the proposed procedure:   the explanation of the procedure   the benefits of the procedure   the potential problems that might occur during recuperation   the risks and side effects of the procedure which could include but are not limited to severe blood loss, infection, stroke or death   the benefits, risks and side effect of alternative procedures including the consequences of declining this procedure or any alternative procedures   the likelihood of achieving satisfactory results. I acknowledge no guarantee or assurance has been made to me regarding the results. I understand that during the course of this treatment/procedure, unforeseen conditions can occur which require an additional or different procedure. I agree to allow my physician or assistants to perform such extension of the original procedure as they may find necessary. I understand that sedation will often result in temporary impairment of memory and fine motor skills and that sedation can occasionally progress to a state of deep sedation or general anesthesia. I understand the risks of anesthesia for surgery include, but are not limited to, sore throat, hoarseness, injury to face, mouth, or teeth; nausea; headache; injury to blood vessels or nerves; death, brain damage, or paralysis.     I understand that if I have a Limitation of Treatment order in effect during my hospitalization, the order may or may not be in effect during this procedure. I give my doctor permission to give me blood or blood products. I understand that there are risks with receiving blood such as hepatitis, AIDS, fever, or allergic reaction. I acknowledge that the risks, benefits, and alternatives of this treatment have been explained to me and that no express or implied warranty has been given by the hospital, any blood bank, or any person or entity as to the blood or blood components transfused. At the discretion of my doctor, I agree to allow observers, equipment/product representatives and allow photographing, and/or televising of the procedure, provided my name or identity is maintained confidentially. I agree the hospital may dispose of or use for scientific or educational purposes any tissue, fluid, or body parts which may be removed.     ________________________________Date________Time______ am/pm  ([place])  Patient or Signature of Closest Relative or Legal Guardian    ________________________________Date________Time______am/pm      Page 1 of  1  Witness

## 2017-10-12 ENCOUNTER — CARE COORDINATION (OUTPATIENT)
Dept: CARE COORDINATION | Age: 77
End: 2017-10-12

## 2017-10-12 ENCOUNTER — HOSPITAL ENCOUNTER (OUTPATIENT)
Dept: PREADMISSION TESTING | Age: 77
Discharge: HOME | End: 2017-10-12
Attending: SURGERY | Admitting: SURGERY

## 2017-10-12 VITALS — HEIGHT: 66 IN | WEIGHT: 190 LBS | BODY MASS INDEX: 30.53 KG/M2

## 2017-10-12 RX ORDER — CHLORHEXIDINE GLUCONATE 0.12 MG/ML
15 RINSE ORAL 2 TIMES DAILY
Status: CANCELLED | OUTPATIENT
Start: 2017-10-16

## 2017-10-12 RX ORDER — CITALOPRAM 20 MG/1
20 TABLET ORAL DAILY
COMMUNITY
End: 2017-11-10

## 2017-10-12 NOTE — PROGRESS NOTES
The following educational items and goals will be achieved upon completion of the patient's Pre-admission testing experience:             Identify the learner who is being assessed for education:  patient                    Ability to Learn:  Exhibits ability to grasp concepts and respond to questions: DELACRUZ HIGH  Ready to Learn: Yes  anxious   Preferred Method of Learning:  verbal  Barriers to Learning: Verbalizes interest  Special Considerations due to cultural, Voodoo, spiritual beliefs:  Yes  Language:  English  :  Vanesa Greenberg[address] [place]  [x] Appropriate evaluation / integration of data as delineated by ASPAN Standards of Perianesthesia Nursing Practice    Pain scale and pain management   [x]Patient verbalizes understanding of pain scale and pain management  [x]Pre-operative determination of patients anticipated Post-Operative pain goal:   4 of 10 on 10 point scale post op goal  [] Other     Medication(s) - Compliance with preop medication instructions  [x] Patient verbalizes understanding of preop medications (see [facility] Presurgical Instructions)    Instructions, Pre op                                                                                            [x] Patient verbalizes understanding of presurgical instructions as reviewed with phone interview nurse or in-person nurse review    Fall Risk Potential, Preoperatively                                                                                   []No preoperative risk identified  [x]Preop risk identified:                    []Sensory deficit        []Motor deficit        []Balance problem        [x]Home medication        []Uses assistive device                    []History of a Fall within the last 30 days    Goal(s) for fall prevention:  [x]Prevent fall or injury by requesting assistance with activities of daily living  [x]Patient / Significant other verbalizes understanding the need to call for assistance prior to getting out of bed during hospitalization      Infection Precautions                                                                                            [x] Patient understands implementation of Surgical Site Infection precautions (see [facility] Presurgical Instructions)     Patient Safety  [x] Patient identification verified  [x] Site verified    Instructions - Discharge Planning for Outpatients  [x] Patient / significant other voices understanding of home care and follow up procedures  [x] Encourage patient / significant other to review discharge instructions the day after procedure due to sedation on day of surgery    Anticipated Special Needs upon discharge:        [] Cooling device        [] Crutches       [] Walker        [x] Wound Support device        [] Drain        [] Other     Instructions - Discharge Planning for Admitted patients  [] Patient / significant other understands plan for admission after surgery  [] Patient / significant other understands plan for anticipated discharge dispostion        10/12/2017 1:03 PM [facility]

## 2017-10-12 NOTE — PROGRESS NOTES
have any large items you may need brought in by your family after your arrival to your hospital room. 15. If you have a Living Will or Durable Power of , please bring a copy on the day of your procedure. 15. With your permission, one family member may accompany you while you are being prepared for surgery. Once you are ready, additional family members may join you. HOW WE KEEP YOU SAFE and WORK TO PREVENT SURGICAL SITE INFECTIONS:  1. Health care workers should always check your ID bracelet to verify your name and birth date. You will be asked many times to state your name, date of birth, and allergies. 2. Health care workers should always clean their hands with soap or alcohol gel before providing care to you. It is okay to ask anyone if they cleaned their hands before they touch you. 3. You will be actively involved in verifying the type of procedure you are having and ensuring the correct surgical site. This will be confirmed multiple times prior to your procedure. Do NOT vinnie your surgery site UNLESS instructed to by your surgeon. 4. Do not shave or wax for 72 hours prior to procedure near your operative site. Shaving with a razor can irritate your skin and make it easier to develop an infection. On the day of your procedure, any hair that needs to be removed near the surgical site will be clipped by a healthcare worker using a special clippers designed to avoid skin irritation. 5. When you are in the operating room, your surgical site will be cleansed with a special soap, and in most cases, you will be given an antibiotic before the surgery begins. AFTER YOUR PROCEDURE:  1. For comfort and safety, arrange to have someone at home with you for the first 24 hours after discharge. 2. You and your family will be given written instructions about your diet, activity, dressing care, medications, and return visits.      3. Always clean your hands before and after caring for your

## 2017-10-12 NOTE — CARE COORDINATION
Goals Addressed             Most Recent     Care Coordination Self Management (pt-stated)   Improving (10/12/2017)             CC Self Management Goal  Patient Goal (What steps will patient take to achieve goal?): A1c <7.0; avoid acute onset episodes r/t poorly controlled DM mgmt  Patient is able to discuss self-management of condition(s):  Pt demonstrates adherence to medications  Pt demonstrates understanding of self-monitoring  Patient is able to identify Red Flags:  Alert to potential adverse drug reactions(s) or side effects and actions to take should they arise  Discuss target symptoms and actions to take should they arise  Identify problems that require immediate PCP or specialist visit  Patient demonstrates understanding of access to PCP/Specialist:  Understands about scheduling routine Follow Up appointments   Understands about sick day appointment options for worsening of symptoms/progression (Same Day, E Visits)            Prior to Admission medications    Medication Sig Start Date End Date Taking?  Authorizing Provider   promethazine (PHENERGAN) 25 MG tablet Take 0.5 tablets by mouth every 8 hours as needed for Nausea 10/11/17 10/21/17  Polina Almendarez MD   polyethylene glycol [facility]) powder Take 17 g by mouth daily 9/26/17 10/26/17  Polina Almendarez MD   insulin glargine (LANTUS) 100 UNIT/ML injection vial ADMINISTER 34 UNITS UNDER THE SKIN EVERY NIGHT 7/19/17   Polina Almendarez MD   HUMALOG KWIKPEN 100 UNIT/ML pen INJECT A MAXIMUM OF 50 UNITS PER SLIDING SCALE UNDER THE SKIN DAILY 6/27/17   Polina Almendarez MD   omeprazole (PRILOSEC) 40 MG delayed release capsule TAKE 1 CAPSULE BY MOUTH TWICE DAILY BEFORE MEALS 6/19/17   Polina Almendarez MD   lisinopril (PRINIVIL;ZESTRIL) 20 MG tablet TAKE 1 TABLET BY MOUTH DAILY 5/15/17   Polina Almendarez MD   pravastatin (PRAVACHOL) 80 MG tablet TAKE 1 TABLET BY MOUTH DAILY 5/8/17   Polina Almendarez MD   Insulin Pen Needle 32G X 4 MM

## 2017-10-19 ENCOUNTER — CARE COORDINATION (OUTPATIENT)
Dept: CASE MANAGEMENT | Age: 77
End: 2017-10-19

## 2017-10-19 NOTE — CARE COORDINATION
Appointments  Date Time Provider Sophy Pereira   10/30/2017 2:00 PM Lourdes Osborne MD [facility]   11/8/2017 4:45 PM [facility] [address]       Rachael Borges RN

## 2017-10-23 ENCOUNTER — TELEPHONE (OUTPATIENT)
Dept: INTERNAL MEDICINE | Age: 77
End: 2017-10-23

## 2017-10-23 NOTE — TELEPHONE ENCOUNTER
No BM - according to the nurse that is what the patient told her, but nurse advised that she did have surgery on 10/17/2017 and was kept overnight. Surgeon - no they have not called    Medication? No medication per Monty Purdy. ER to be evaluated.

## 2017-10-30 ENCOUNTER — OFFICE VISIT (OUTPATIENT)
Dept: SURGERY | Age: 77
End: 2017-10-30

## 2017-10-30 VITALS
BODY MASS INDEX: 30.86 KG/M2 | WEIGHT: 192 LBS | TEMPERATURE: 97.8 F | SYSTOLIC BLOOD PRESSURE: 144 MMHG | DIASTOLIC BLOOD PRESSURE: 82 MMHG | HEIGHT: 66 IN

## 2017-10-30 DIAGNOSIS — Z90.49 S/P LAPAROSCOPIC CHOLECYSTECTOMY: Primary | ICD-10-CM

## 2017-10-30 PROCEDURE — 99024 POSTOP FOLLOW-UP VISIT: CPT | Performed by: SURGERY

## 2017-10-30 NOTE — PATIENT INSTRUCTIONS
Yenny Walter has a pain level on 0/10 scale  4    Location incision site    Description aching    Radiation   No    Duration  1 week(s)    Time  mild

## 2017-10-31 ENCOUNTER — TELEPHONE (OUTPATIENT)
Dept: INTERNAL MEDICINE | Age: 77
End: 2017-10-31

## 2017-10-31 ENCOUNTER — CARE COORDINATION (OUTPATIENT)
Dept: CARE COORDINATION | Age: 77
End: 2017-10-31

## 2017-10-31 RX ORDER — PROMETHAZINE HYDROCHLORIDE 25 MG/1
12.5 TABLET ORAL EVERY 8 HOURS PRN
COMMUNITY
End: 2017-11-09 | Stop reason: ALTCHOICE

## 2017-10-31 NOTE — CARE COORDINATION
Ambulatory Care Coordination Note  10/31/2017  CM Risk Score: 10  Silas Mortality Risk Score: 10.08    ACC: Iris Birch, RN    Summary Note: nausea continues. No emesis. Being selective w/diet, eating bland foods. Admits drinking juice pouches for beverage, acknowledging this may cause elevated blood sugars. Blood sugars ranging 120-252; following s/s as directed. drinks two juices boxes if blood sugar <150 at hs, which she has found effective in avoiding hypoglycemic episodes. Felt too nauseous for line item review of Blood sugars, but states mealtime coverage have required 4-6 units, usually. Verbalizes understanding of s/s to report, when to notify physician. Hoping to schedule PCP follow up sooner than currently scheduled 11/8/17. Call transferred to  for scheduling w/PCP this week. Note (for [facility] reference):   advises d/t PCP schedule is full, note will need to be forwarded by  to PCP to advise for scheduling purposes. Care Coordination Interventions    Program Enrollment:  Complex Care  Referral from Primary Care Provider:  No  Suggested Interventions and Community Resources  Diabetes Education:  Completed  Fall Risk Prevention:  Completed  Medi Set or Pill Pack:  Completed  Registered Dietician:  [address] [facility]  Specialty Services Referral:  Completed (Comment: scheduled w/Dr Sangita Holloway 10/17/17)  Transportation Support:  Completed  Zone Management Tools:  Completed  Other Services or Interventions:  pt agrees to monitor and RECORD blood sugars; will mail BGM log to PCP for review (per plan as of 8/22/17); 9/6/17 forgot to keep log, agrees to Jayden Vincent     None          Prior to Admission medications    Medication Sig Start Date End Date Taking?  Authorizing Provider   promethazine (PHENERGAN) 25 MG tablet Take 12.5 mg by mouth every 8 hours as needed for Nausea   Yes Historical Provider, MD   docusate sodium (COLACE) 100 MG capsule

## 2017-10-31 NOTE — TELEPHONE ENCOUNTER
Pt had her gall bladder taken out 2 weeks ago-most of the stomach pain is gone  Pt has nausea -\"feels miserable all the time\"-no vomitting  She is taking Promethazine 25mg 1/2 tablet every 8 hours for the nausea  She gets light-headed when she moves   Sometimes she has to go out in her hallway to walk because she gets dizzy and sweaty and it is colder in the hallway and the cold helps  Pt has 4 month f/u on 11/8 but would like to be seen this week due to the above symptoms

## 2017-11-01 ENCOUNTER — CARE COORDINATION (OUTPATIENT)
Dept: CARE COORDINATION | Age: 77
End: 2017-11-01

## 2017-11-01 ENCOUNTER — CARE COORDINATION (OUTPATIENT)
Dept: CASE MANAGEMENT | Age: 77
End: 2017-11-01

## 2017-11-01 ENCOUNTER — TELEPHONE (OUTPATIENT)
Dept: INTERNAL MEDICINE | Age: 77
End: 2017-11-01

## 2017-11-01 NOTE — TELEPHONE ENCOUNTER
Friday Nov 3rd @ 1:30 pm  [address], [address] [facility]  [phone]  Dr. Mendoza Ringer to the patient about appointment.

## 2017-11-01 NOTE — TELEPHONE ENCOUNTER
Call to patient on sister's phone. Advised sister of this information. Call to patient also. Advised. Saw Surgeon yesterday, advised wait another week. No medication given. No changes in the Sx. They are the same as they were before the surgery.

## 2017-11-02 ENCOUNTER — TELEPHONE (OUTPATIENT)
Dept: INTERNAL MEDICINE | Age: 77
End: 2017-11-02

## 2017-11-02 NOTE — TELEPHONE ENCOUNTER
Patient calling   She said that she is still having nausea and the Phenergan isn't working.  She would like something else called into her pharmacy

## 2017-11-08 ENCOUNTER — TELEPHONE (OUTPATIENT)
Dept: INTERNAL MEDICINE | Age: 77
End: 2017-11-08

## 2017-11-08 NOTE — TELEPHONE ENCOUNTER
[employer] needs orders from Dr Emery Rowley for Diabetic shoes and inserts    Fax: [phone]

## 2017-11-08 NOTE — TELEPHONE ENCOUNTER
Returning phone call to Sofia Funez. Order:    Diabetic Shoes and Inserts  Qty. 2  Per E11.9  Please supply patient with these.

## 2017-11-09 ENCOUNTER — CARE COORDINATION (OUTPATIENT)
Dept: CARE COORDINATION | Age: 77
End: 2017-11-09

## 2017-11-09 RX ORDER — ONDANSETRON 4 MG/1
8 TABLET, FILM COATED ORAL EVERY 8 HOURS PRN
COMMUNITY
End: 2017-11-10 | Stop reason: SDUPTHER

## 2017-11-10 ENCOUNTER — OFFICE VISIT (OUTPATIENT)
Dept: INTERNAL MEDICINE | Age: 77
End: 2017-11-10

## 2017-11-10 VITALS
HEART RATE: 66 BPM | BODY MASS INDEX: 30.37 KG/M2 | WEIGHT: 189 LBS | OXYGEN SATURATION: 96 % | DIASTOLIC BLOOD PRESSURE: 88 MMHG | SYSTOLIC BLOOD PRESSURE: 140 MMHG | HEIGHT: 66 IN

## 2017-11-10 DIAGNOSIS — F33.1 MAJOR DEPRESSIVE DISORDER, RECURRENT, MODERATE (HCC): ICD-10-CM

## 2017-11-10 DIAGNOSIS — R11.0 NAUSEA: ICD-10-CM

## 2017-11-10 DIAGNOSIS — F32.9 REACTIVE DEPRESSION: Primary | ICD-10-CM

## 2017-11-10 PROCEDURE — 1090F PRES/ABSN URINE INCON ASSESS: CPT | Performed by: NURSE PRACTITIONER

## 2017-11-10 PROCEDURE — G8417 CALC BMI ABV UP PARAM F/U: HCPCS | Performed by: NURSE PRACTITIONER

## 2017-11-10 PROCEDURE — G8484 FLU IMMUNIZE NO ADMIN: HCPCS | Performed by: NURSE PRACTITIONER

## 2017-11-10 PROCEDURE — 1123F ACP DISCUSS/DSCN MKR DOCD: CPT | Performed by: NURSE PRACTITIONER

## 2017-11-10 PROCEDURE — 99213 OFFICE O/P EST LOW 20 MIN: CPT | Performed by: NURSE PRACTITIONER

## 2017-11-10 PROCEDURE — 1111F DSCHRG MED/CURRENT MED MERGE: CPT | Performed by: NURSE PRACTITIONER

## 2017-11-10 PROCEDURE — 4040F PNEUMOC VAC/ADMIN/RCVD: CPT | Performed by: NURSE PRACTITIONER

## 2017-11-10 PROCEDURE — G8400 PT W/DXA NO RESULTS DOC: HCPCS | Performed by: NURSE PRACTITIONER

## 2017-11-10 PROCEDURE — 1036F TOBACCO NON-USER: CPT | Performed by: NURSE PRACTITIONER

## 2017-11-10 PROCEDURE — G8427 DOCREV CUR MEDS BY ELIG CLIN: HCPCS | Performed by: NURSE PRACTITIONER

## 2017-11-10 RX ORDER — ONDANSETRON HYDROCHLORIDE 8 MG/1
8 TABLET, FILM COATED ORAL EVERY 8 HOURS PRN
Qty: 30 TABLET | Refills: 0 | Status: SHIPPED | OUTPATIENT
Start: 2017-11-10 | End: 2019-02-22 | Stop reason: CLARIF

## 2017-11-10 RX ORDER — CITALOPRAM 10 MG/1
10 TABLET ORAL DAILY
Qty: 30 TABLET | Refills: 3 | Status: SHIPPED | OUTPATIENT
Start: 2017-11-10 | End: 2020-09-23 | Stop reason: SDUPTHER

## 2017-11-10 ASSESSMENT — ENCOUNTER SYMPTOMS
BLOOD IN STOOL: 0
DIARRHEA: 0
NAUSEA: 1
ABDOMINAL DISTENTION: 0
VOMITING: 0
ANAL BLEEDING: 0
ABDOMINAL PAIN: 0
CONSTIPATION: 0
RECTAL PAIN: 0

## 2017-11-10 NOTE — PROGRESS NOTES
constipation, diarrhea, rectal pain and vomiting. Hematological: Negative for adenopathy. Psychiatric/Behavioral: The patient is nervous/anxious (pacing ). Depression         Objective:     Vitals:    11/10/17 1135 11/10/17 1154   BP: (!) 152/96 (!) 140/88   Site: Left Arm Left Arm   Position: Sitting Sitting   Cuff Size: Medium Adult Medium Adult   Pulse: 66    SpO2: 96%    Weight: 189 lb (85.7 kg)    Height: 5' 6\" (1.676 m)      Physical Exam   Constitutional: She appears well-developed and well-nourished. Cardiovascular: Normal rate, regular rhythm and normal heart sounds. Pulmonary/Chest: Effort normal and breath sounds normal.   Abdominal: Soft. Bowel sounds are normal. She exhibits no distension. There is no tenderness. Skin: Skin is warm and dry. Psychiatric: She has a normal mood and affect. Her behavior is normal. Judgment and thought content normal.   Depressed  Pacing during appointment       Current Outpatient Prescriptions   Medication Sig Dispense Refill    citalopram (CELEXA) 10 MG tablet Take 1 tablet by mouth daily 30 tablet 3    ondansetron (ZOFRAN) 8 MG tablet Take 1 tablet by mouth every 8 hours as needed for Nausea or Vomiting 30 tablet 0    docusate sodium (COLACE) 100 MG capsule Take 1 capsule by mouth 2 times daily as needed for Constipation (Please take while taking narcotic pain medicine. If you develop loose or watery stools, then stop taking.) Please take while taking narcotic pain medicine. If you develop loose or watery stools, then stop taking.  20 capsule 0    insulin glargine (LANTUS) 100 UNIT/ML injection vial ADMINISTER 34 UNITS UNDER THE SKIN EVERY NIGHT 10 mL 0    HUMALOG KWIKPEN 100 UNIT/ML pen INJECT A MAXIMUM OF 50 UNITS PER SLIDING SCALE UNDER THE SKIN DAILY 15 mL 0    omeprazole (PRILOSEC) 40 MG delayed release capsule TAKE 1 CAPSULE BY MOUTH TWICE DAILY BEFORE MEALS 180 capsule 0    lisinopril (PRINIVIL;ZESTRIL) 20 MG tablet TAKE 1 TABLET BY MOUTH DAILY 30 tablet 2    pravastatin (PRAVACHOL) 80 MG tablet TAKE 1 TABLET BY MOUTH DAILY 90 tablet 0    Insulin Pen Needle 32G X 4 MM MISC Testing 3 xs daily per 311.9 100 each 2    Multiple Vitamins-Minerals (THERAPEUTIC MULTIVITAMIN-MINERALS) tablet Take 1 tablet by mouth daily. No current facility-administered medications for this visit. Assessment:     1. Reactive depression    2. Nausea      Plan:   1. Reactive depression  - restart SSRI  - follow up 3 weeks  - may be a withdrawal for SSRI?  - citalopram (CELEXA) 10 MG tablet; Take 1 tablet by mouth daily  Dispense: 30 tablet; Refill: 3    2. Nausea  - ondansetron (ZOFRAN) 8 MG tablet; Take 1 tablet by mouth every 8 hours as needed for Nausea or Vomiting  Dispense: 30 tablet; Refill: 0        Discussed use, benefit, and side effects of all prescribed medications. Barriers to medication compliance addressed. All patient questions answered. Pt voiced understanding. All patient questions answered. Pt voiced understanding.

## 2017-11-10 NOTE — PATIENT INSTRUCTIONS
Patient Education        Low-Fiber Diet: Care Instructions  Your Care Instructions  When your bowels are irritated, you may need to limit fiber in your diet until the problem clears up. Your doctor and dietitian can help you design a low-fiber diet based on your health and what you prefer to eat. Ask your doctor how long you should stay on a low-fiber diet. Your doctor probably will have you start adding more fiber to your diet as you get better. Always talk with your doctor or dietitian before you make changes in your diet. Follow-up care is a key part of your treatment and safety. Be sure to make and go to all appointments, and call your doctor if you are having problems. Its also a good idea to know your test results and keep a list of the medicines you take. How can you care for yourself at home? · Choose white or refined grains, and avoid whole grains. That means eating white or refined cereals, breads, crackers, rice, or pasta. · Peel the skin from fruits and vegetables before you eat or cook them. Avoid eating skins, seeds, and hulls. ¨ Eat frozen or canned fruit. Low-fiber fruits include applesauce, ripe bananas, and fruit juice without pulp. ¨ Eat low-fiber vegetables, which include well-cooked vegetables and vegetable juice. · Drink or eat milk, yogurt, or other milk products, if you can digest dairy without too many problems. Your doctor may limit milk and milk products for a while. If so, he or she may recommend a calcium and vitamin D supplement. · Eat well-cooked meat, such as chicken, turkey, fish, or lean meat. You also can eat eggs and tofu.   · Avoid these foods:  ¨ Bran, brown or wild rice, oatmeal, granola, corn, sakina crackers, barley, and whole wheat and other whole-grain breads, such as rye bread  ¨ Cereals with more than 3 grams of fiber a serving  ¨ Berries, rhubarb, prunes, prune juice, and all dried fruits  ¨ Raw vegetables  ¨ Cabbage, broccoli, brussels sprouts, and cauliflower  ¨ Cooked dried beans, lentils, and split peas  ¨ Crunchy peanut butter  ¨ Ice cream with fruit pieces in it  ¨ Coconut, nuts, popcorn, raisins, and seeds, or any ice cream, yogurt, or cheese with these in them  Where can you learn more? Go to [email or web address] and sign in to your Next[employer] account. Enter V070 in the [facility] box to learn more about \"Low-Fiber Diet: Care Instructions. \"     If you do not have an account, please click on the \"Sign Up Now\" link. Current as of: July 26, 2016  Content Version: 11.3  © 6815-4196 [employer]. Care instructions adapted under license by [facility] ([facility]). If you have questions about a medical condition or this instruction, always ask your healthcare professional. [address] any warranty or liability for your use of this information.

## 2017-11-14 ENCOUNTER — CARE COORDINATION (OUTPATIENT)
Dept: CARE COORDINATION | Age: 77
End: 2017-11-14

## 2017-11-14 ENCOUNTER — TELEPHONE (OUTPATIENT)
Dept: INTERNAL MEDICINE | Age: 77
End: 2017-11-14

## 2017-11-14 NOTE — CARE COORDINATION
Reviewed w/care team outcome of home care nurse report of pt status & implementation of 911 by PCP care team following home care nurse report. Unable to directly reach Malika Bell, home care nurse. Spoke w/MONICA Robin @ [facility] who advised [facility] pt's sister reportedly requested Andrea Welch not to call 911, believing pt was just venting frustration. Kandice Markham 911 has been dispatched to pt address, by PCP care team, as appropriate d/t reported suicidal ideation.     Zeke Manriquez agrees to review w/home care team appropriate protocol in response to such instance (ie call 911, notify PCP to inform)

## 2017-11-14 NOTE — TELEPHONE ENCOUNTER
Dolores Navarrete with [facility] said pt is making suicidal ideations to her today and to the Occupational Therapist that sees her.   Dolores Navarrete also called pt's sister to let her know what is going on  Per Dr Melo Norton 712 needs to be called, Dolores Navarrete advised

## 2017-11-14 NOTE — TELEPHONE ENCOUNTER
Please advise sister needs to call 911 or take to [facility] or  [facility] where they have psych

## 2017-11-20 ENCOUNTER — TELEPHONE (OUTPATIENT)
Dept: INTERNAL MEDICINE | Age: 77
End: 2017-11-20

## 2017-11-20 NOTE — TELEPHONE ENCOUNTER
Returning call. appt made with Matilda. Call to sister also and advised appointment tomorrow. independent

## 2017-11-20 NOTE — TELEPHONE ENCOUNTER
Pt needs HFU after being discharged from [facility] received the sliding scale for Humalog and is not sure it is correct  They will fax a discharge summary to the office   Please call pt with appt date and time

## 2017-11-20 NOTE — TELEPHONE ENCOUNTER
Pt is transitioning back to assisted living this afternoon    [facility] needs a copy of her sliding scale for Humalog  Fax: [phone]

## 2017-11-21 ENCOUNTER — OFFICE VISIT (OUTPATIENT)
Dept: INTERNAL MEDICINE | Age: 77
End: 2017-11-21

## 2017-11-21 ENCOUNTER — CARE COORDINATION (OUTPATIENT)
Dept: CARE COORDINATION | Age: 77
End: 2017-11-21

## 2017-11-21 VITALS
HEIGHT: 66 IN | WEIGHT: 191 LBS | OXYGEN SATURATION: 94 % | SYSTOLIC BLOOD PRESSURE: 140 MMHG | TEMPERATURE: 98.6 F | HEART RATE: 63 BPM | DIASTOLIC BLOOD PRESSURE: 88 MMHG | BODY MASS INDEX: 30.7 KG/M2

## 2017-11-21 DIAGNOSIS — Z79.4 TYPE 2 DIABETES MELLITUS WITHOUT COMPLICATION, WITH LONG-TERM CURRENT USE OF INSULIN (HCC): Primary | ICD-10-CM

## 2017-11-21 DIAGNOSIS — Z79.4 TYPE 2 DIABETES MELLITUS WITHOUT COMPLICATION, WITH LONG-TERM CURRENT USE OF INSULIN (HCC): ICD-10-CM

## 2017-11-21 DIAGNOSIS — F32.9 REACTIVE DEPRESSION: Primary | ICD-10-CM

## 2017-11-21 DIAGNOSIS — E11.9 TYPE 2 DIABETES MELLITUS WITHOUT COMPLICATION, WITH LONG-TERM CURRENT USE OF INSULIN (HCC): ICD-10-CM

## 2017-11-21 DIAGNOSIS — E11.9 TYPE 2 DIABETES MELLITUS WITHOUT COMPLICATION, WITH LONG-TERM CURRENT USE OF INSULIN (HCC): Primary | ICD-10-CM

## 2017-11-21 PROCEDURE — 1036F TOBACCO NON-USER: CPT | Performed by: NURSE PRACTITIONER

## 2017-11-21 PROCEDURE — G8427 DOCREV CUR MEDS BY ELIG CLIN: HCPCS | Performed by: NURSE PRACTITIONER

## 2017-11-21 PROCEDURE — 1123F ACP DISCUSS/DSCN MKR DOCD: CPT | Performed by: NURSE PRACTITIONER

## 2017-11-21 PROCEDURE — 1090F PRES/ABSN URINE INCON ASSESS: CPT | Performed by: NURSE PRACTITIONER

## 2017-11-21 PROCEDURE — G8417 CALC BMI ABV UP PARAM F/U: HCPCS | Performed by: NURSE PRACTITIONER

## 2017-11-21 PROCEDURE — G8400 PT W/DXA NO RESULTS DOC: HCPCS | Performed by: NURSE PRACTITIONER

## 2017-11-21 PROCEDURE — 99213 OFFICE O/P EST LOW 20 MIN: CPT | Performed by: NURSE PRACTITIONER

## 2017-11-21 PROCEDURE — 4040F PNEUMOC VAC/ADMIN/RCVD: CPT | Performed by: NURSE PRACTITIONER

## 2017-11-21 PROCEDURE — G8484 FLU IMMUNIZE NO ADMIN: HCPCS | Performed by: NURSE PRACTITIONER

## 2017-11-21 RX ORDER — PANTOPRAZOLE SODIUM 40 MG/1
40 GRANULE, DELAYED RELEASE ORAL
COMMUNITY
End: 2017-12-21 | Stop reason: SDUPTHER

## 2017-11-21 RX ORDER — DIPHENHYDRAMINE HCL 25 MG
25 CAPSULE ORAL EVERY 6 HOURS PRN
COMMUNITY
End: 2020-11-13 | Stop reason: CLARIF

## 2017-11-21 RX ORDER — LAMOTRIGINE 25 MG/1
25 TABLET ORAL DAILY
COMMUNITY
End: 2017-12-21 | Stop reason: SDUPTHER

## 2017-11-21 RX ORDER — MIRTAZAPINE 15 MG/1
7.5 TABLET, FILM COATED ORAL NIGHTLY
COMMUNITY
End: 2017-12-21 | Stop reason: SDUPTHER

## 2017-11-21 RX ORDER — TRAZODONE HYDROCHLORIDE 50 MG/1
50 TABLET ORAL NIGHTLY
COMMUNITY
End: 2017-12-21 | Stop reason: SDUPTHER

## 2017-11-21 ASSESSMENT — ENCOUNTER SYMPTOMS
COUGH: 0
WHEEZING: 0
SHORTNESS OF BREATH: 0

## 2017-11-21 ASSESSMENT — PATIENT HEALTH QUESTIONNAIRE - PHQ9
6. FEELING BAD ABOUT YOURSELF - OR THAT YOU ARE A FAILURE OR HAVE LET YOURSELF OR YOUR FAMILY DOWN: 1
3. TROUBLE FALLING OR STAYING ASLEEP: 0
7. TROUBLE CONCENTRATING ON THINGS, SUCH AS READING THE NEWSPAPER OR WATCHING TELEVISION: 0
4. FEELING TIRED OR HAVING LITTLE ENERGY: 0
10. IF YOU CHECKED OFF ANY PROBLEMS, HOW DIFFICULT HAVE THESE PROBLEMS MADE IT FOR YOU TO DO YOUR WORK, TAKE CARE OF THINGS AT HOME, OR GET ALONG WITH OTHER PEOPLE: 0
SUM OF ALL RESPONSES TO PHQ9 QUESTIONS 1 & 2: 0
9. THOUGHTS THAT YOU WOULD BE BETTER OFF DEAD, OR OF HURTING YOURSELF: 0
2. FEELING DOWN, DEPRESSED OR HOPELESS: 0
8. MOVING OR SPEAKING SO SLOWLY THAT OTHER PEOPLE COULD HAVE NOTICED. OR THE OPPOSITE, BEING SO FIGETY OR RESTLESS THAT YOU HAVE BEEN MOVING AROUND A LOT MORE THAN USUAL: 0
5. POOR APPETITE OR OVEREATING: 0
1. LITTLE INTEREST OR PLEASURE IN DOING THINGS: 0
SUM OF ALL RESPONSES TO PHQ QUESTIONS 1-9: 1

## 2017-11-21 NOTE — CARE COORDINATION
Ambulatory Care Coordination Note  11/21/2017  CM Risk Score: 10  Silas Mortality Risk Score: 10.08    ACC: Evy Gupta, RN    Summary Note: Met with patient and patient's sister prior to exam room visit. Patient had just dc'd from [facility] yesterday (11/20). She and her sister are acting on plans to transition [place] at [facility] by the end of this month. Reports nausea resolved since taking reglan. Receptive to skilled home health follow-up; referral placed for medication management/education reinforcement due to recent med changes and personal history of noncompliance (pt had self-dc'd celexa in July and not reported until apx. 2 weeks ago). Care Coordination Interventions    Program Enrollment:  Complex Care  Referral from Primary Care Provider:  No  Suggested Interventions and Community Resources  Behavorial Health: In Process (Comment: scheduled eval with psychiatrist 11/22 with plan to follow w/ Dr. Manjinder Pacheco at [facility] )  Diabetes Education:  Completed  Fall Risk Prevention:  Completed  Home Health Services:   In Process (Comment: VNA)  Medi Set or Pill Pack:  Completed  Registered Dietician:  [facility]  Specialty Services Referral:  Completed (Comment: scheduled w/Dr RIVERA [facility] 10/17/17)  Transportation Support:  Completed  Zone Management Tools:  Completed  Other Services or Interventions:  pt agrees to monitor and RECORD blood sugars; will mail BGM log to PCP for review (per plan as of 8/22/17); 9/6/17 forgot to keep log, agrees to [place] Self Management (pt-stated)   Improving (11/21/2017)             CC Self Management Goal  Patient Goal (What steps will patient take to achieve goal?): A1c <7.0; avoid acute onset episodes r/t poorly controlled DM mgmt  Patient is able to discuss self-management of condition(s):  Pt demonstrates adherence to medications  Pt demonstrates understanding of

## 2017-11-21 NOTE — PROGRESS NOTES
Subjective:      Patient ID: Carlos Roberts is a 68 y.o. female. HPI  She is here today for hostpial follow up from admission to the [facility] for one week for suicidal ideation. She was discharged yesterday. She is tolerating her medications well. She is here today with her sister. She denies any thoughts of suicidal ideation or thoughts of self harm. She was started on celexa last week, while in [facility] she was started on lamictal, remeron, trazodone and benadryl. She has been tolerating well. She is due to see psychiatrist tomorrow and then Dr. Nydia Arreguin will see her at [facility] to follow psych meds. She is worried about her BG levels, she thinks that her insulin needs to be increased, but she does not recall what her BG levels were. She was 71 today, unfortunately we do not have strips in the office today to check her BG level now.    Past Medical History:   Diagnosis Date    Almonte's esophagus without dysplasia     Depression     History of blood transfusion      with childbirth    Hyperlipidemia     Hypertension     Hypothyroid     Shingles     Type II or unspecified type diabetes mellitus without mention of complication, not stated as uncontrolled      Past Surgical History:   Procedure Laterality Date    BLADDER SUSPENSION      CATARACT REMOVAL  2011    both     SECTION      CHOLECYSTECTOMY, LAPAROSCOPIC  10/17/2017    LAPAROSCOPIC CHOLECYSTECTOMY WITH CHOLANGIOGRAM    KNEE SURGERY  2011    left    LUMBAR LAMINECTOMY  3/2/15    microlumbar laminectomy L4-5 with c-arm    TONSILLECTOMY       Family History   Problem Relation Age of Onset    Coronary Art Dis Mother     Diabetes Mother     Other Mother      uterine cancer    Coronary Art Dis Father     Other Sister      stomach cancer     Social History     Social History    Marital status:      Spouse name: N/A    Number of children: 2    Years of education: N/A     Occupational History    retired teacher  pantoprazole sodium (PROTONIX) 40 MG PACK packet Take 40 mg by mouth every morning (before breakfast)      traZODone (DESYREL) 50 MG tablet Take 50 mg by mouth nightly      insulin lispro (HUMALOG) 100 UNIT/ML injection vial Inject 1 Units into the skin 3 times daily (before meals) 150-199- 1 unit 200-249- 2 units 250-299- 3 units 300-349- 4 units 350 or > - 8 units      citalopram (CELEXA) 10 MG tablet Take 1 tablet by mouth daily 30 tablet 3    ondansetron (ZOFRAN) 8 MG tablet Take 1 tablet by mouth every 8 hours as needed for Nausea or Vomiting 30 tablet 0    insulin glargine (LANTUS) 100 UNIT/ML injection vial ADMINISTER 34 UNITS UNDER THE SKIN EVERY NIGHT 10 mL 0    lisinopril (PRINIVIL;ZESTRIL) 20 MG tablet TAKE 1 TABLET BY MOUTH DAILY 30 tablet 2    pravastatin (PRAVACHOL) 80 MG tablet TAKE 1 TABLET BY MOUTH DAILY 90 tablet 0    Insulin Pen Needle 32G X 4 MM MISC Testing 3 xs daily per 311.9 100 each 2    Multiple Vitamins-Minerals (THERAPEUTIC MULTIVITAMIN-MINERALS) tablet Take 1 tablet by mouth daily. No current facility-administered medications for this visit. Assessment:     1. Reactive depression    2. Type 2 diabetes mellitus without complication, with long-term current use of insulin ([facility]      Plan:   1. Reactive depression  - following with psych at [facility]  - continue treatment plan  - patient is stable today, follow with psych tomorrow as planned    2. Type 2 diabetes mellitus without complication, with long-term current use of insulin ([facility])  - will have her track BG levels and let office know  - continue treatment plan at this time. Discussed use, benefit, and side effects of all prescribed medications. Barriers to medication compliance addressed. All patient questions answered. Pt voiced understanding. All patient questions answered. Pt voiced understanding.

## 2017-12-07 ENCOUNTER — CARE COORDINATION (OUTPATIENT)
Dept: CARE COORDINATION | Age: 77
End: 2017-12-07

## 2017-12-07 NOTE — CARE COORDINATION
Left vm requesting return callback at pt's convenience. Provided & repeated direct callback to reach [address]. Acknowledged recall of pt & pt's sister sharing pt would be \"busy with moving\" (ILU to ALU end of Nov/early Dec). Invited pt to make outreach at earliest convenience. RNCC hoping to review current status/needs.

## 2017-12-11 NOTE — CARE COORDINATION
counting, Plate Method   How often do you test your blood sugar?:  Daily, Meals, Bedtime   Do you have barriers with adherence to non-pharmacologic self-management interventions? (Nutrition/Exercise/Self-Monitoring):  No   Have you ever had to go to the ED for symptoms of low blood sugar?:  No   What is the date of your last ED visit for low blood sugar?:  8/20/16       No patient-reported symptoms   Do you have hyperglycemia symptoms?:  No   Do you have hypoglycemia symptoms?:  No   Blood Sugar Monitoring Regimen:  Morning Fasting, Before Meals   Blood Sugar Trends:  Fluctuating (Comment: 69-70 a.m. fasting; 450-562)          Care Coordination Interventions    Program Enrollment:  Complex Care  Referral from Primary Care Provider:  No  Suggested Interventions and [facility]: In Process (Comment: scheduled eval with psychiatrist 11/22 with plan to follow w/ Dr. Gadiel Ballesteros at [facility] )  Diabetes Education:  Completed  Fall Risk Prevention:  Completed  Home Health Services:   In Process (Comment: VNA)  Medi Set or Pill Pack:  Completed  Registered Dietician:  [facility]  Specialty Services Referral:  Completed (Comment: scheduled w/Dr RIVERA [facility] 10/17/17)  Transportation Support:  Completed  Zone Management Tools:  Completed  Other Services or Interventions:  pt agrees to monitor and RECORD blood sugars; will mail BGM log to PCP for review (per plan as of 8/22/17); 9/6/17 forgot to keep log, agrees to [place] Self Management (pt-stated)   On track (12/7/2017)             CC Self Management Goal  Patient Goal (What steps will patient take to achieve goal?): A1c <7.0; avoid acute onset episodes r/t poorly controlled DM mgmt  Patient is able to discuss self-management of condition(s):  Pt demonstrates adherence to medications  Pt demonstrates understanding of self-monitoring  Patient is able to identify Red Flags:  Alert to potential adverse drug reactions(s) or side effects and actions to take should they arise  Discuss target symptoms and actions to take should they arise  Identify problems that require immediate PCP or specialist visit  Patient demonstrates understanding of access to PCP/Specialist:  Understands about scheduling routine Follow Up appointments   Understands about sick day appointment options for worsening of symptoms/progression (Same Day, E Visits)       Conditions and Symptoms   On track (12/7/2017)             I will schedule office visits, as directed by my provider. I will keep my appointment or reschedule if I have to cancel. I will notify my provider of any barriers to my plan of care. I will follow my Zone Management tool to seek urgent or emergent care. I will notify my provider of any symptoms that indicate a worsening of my condition. Barriers: impairment:  physical: general malaise, nausea  Plan for overcoming my barriers: will continue to follow w/providers in my care for tx; will follow tx recommendations of providers  Confidence: no numeric value assigned by pt/10  Anticipated Goal Completion Date: 12/31/18              Prior to Admission medications    Medication Sig Start Date End Date Taking?  Authorizing Provider   diphenhydrAMINE (BENADRYL) 25 MG capsule Take 25 mg by mouth every 6 hours as needed for Itching   Yes Historical Provider, MD   lamoTRIgine (LAMICTAL) 25 MG tablet Take 25 mg by mouth daily   Yes Historical Provider, MD   mirtazapine (REMERON) 15 MG tablet Take 7.5 mg by mouth nightly    Yes Historical Provider, MD   pantoprazole sodium (PROTONIX) 40 MG PACK packet Take 40 mg by mouth every morning (before breakfast)   Yes Historical Provider, MD   traZODone (DESYREL) 50 MG tablet Take 50 mg by mouth nightly   Yes Historical Provider, MD   insulin lispro (HUMALOG) 100 UNIT/ML injection vial Inject 1 Units into the skin 3 times daily (before meals) 150-199- 1 unit 200-249- 2 units 250-299- 3 units 300-349- 4 units 350 or > - 8 units   Yes Historical Provider, MD   citalopram (CELEXA) 10 MG tablet Take 1 tablet by mouth daily 11/10/17  Yes Matilda Bean CNP   ondansetron (ZOFRAN) 8 MG tablet Take 1 tablet by mouth every 8 hours as needed for Nausea or Vomiting 11/10/17  Yes Matilda Chandler CNP   insulin glargine (LANTUS) 100 UNIT/ML injection vial ADMINISTER 34 UNITS UNDER THE SKIN EVERY NIGHT 7/19/17  Yes Dariel Jean-Baptiste MD   lisinopril (PRINIVIL;ZESTRIL) 20 MG tablet TAKE 1 TABLET BY MOUTH DAILY 5/15/17  Yes Dariel Jean-Baptiste MD   pravastatin (PRAVACHOL) 80 MG tablet TAKE 1 TABLET BY MOUTH DAILY 5/8/17  Yes Dariel Jean-Baptiste MD   Insulin Pen Needle 32G X 4 MM MISC Testing 3 xs daily per 311.9 4/18/16  Yes Dariel Jean-Baptiste MD   Multiple Vitamins-Minerals (THERAPEUTIC MULTIVITAMIN-MINERALS) tablet Take 1 tablet by mouth daily.    Yes Historical Provider, MD       Future Appointments  Date Time Provider Sophy Pereira   2/21/2018 4:30 PM Dariel Jean-Baptiste MD [facility]

## 2017-12-13 ENCOUNTER — CARE COORDINATION (OUTPATIENT)
Dept: CARE COORDINATION | Age: 77
End: 2017-12-13

## 2017-12-13 NOTE — CARE COORDINATION
self-monitoring  Patient is able to identify Red Flags:  Alert to potential adverse drug reactions(s) or side effects and actions to take should they arise  Discuss target symptoms and actions to take should they arise  Identify problems that require immediate PCP or specialist visit  Patient demonstrates understanding of access to PCP/Specialist:  Understands about scheduling routine Follow Up appointments   Understands about sick day appointment options for worsening of symptoms/progression (Same Day, E Visits)       Conditions and Symptoms   On track (12/13/2017)             I will schedule office visits, as directed by my provider. I will keep my appointment or reschedule if I have to cancel. I will notify my provider of any barriers to my plan of care. I will follow my Zone Management tool to seek urgent or emergent care. I will notify my provider of any symptoms that indicate a worsening of my condition. Barriers: impairment:  physical: general malaise, nausea  Plan for overcoming my barriers: will continue to follow w/providers in my care for tx; will follow tx recommendations of providers  Confidence: no numeric value assigned by pt/10  Anticipated Goal Completion Date: 12/31/18              Prior to Admission medications    Medication Sig Start Date End Date Taking?  Authorizing Provider   diphenhydrAMINE (BENADRYL) 25 MG capsule Take 25 mg by mouth every 6 hours as needed for Itching    Historical Provider, MD   lamoTRIgine (LAMICTAL) 25 MG tablet Take 25 mg by mouth daily    Historical Provider, MD   mirtazapine (REMERON) 15 MG tablet Take 7.5 mg by mouth nightly     Historical Provider, MD   pantoprazole sodium (PROTONIX) 40 MG PACK packet Take 40 mg by mouth every morning (before breakfast)    Historical Provider, MD   traZODone (DESYREL) 50 MG tablet Take 50 mg by mouth nightly    Historical Provider, MD   insulin lispro (HUMALOG) 100 UNIT/ML injection vial Inject 1 Units into the skin 3 times daily (before meals) 150-199- 1 unit 200-249- 2 units 250-299- 3 units 300-349- 4 units 350 or > - 8 units    Historical Provider, MD   citalopram (CELEXA) 10 MG tablet Take 1 tablet by mouth daily 11/10/17   Riley Duane, CNP   ondansetron (ZOFRAN) 8 MG tablet Take 1 tablet by mouth every 8 hours as needed for Nausea or Vomiting 11/10/17   Riley Duane, CNP   insulin glargine (LANTUS) 100 UNIT/ML injection vial ADMINISTER 34 UNITS UNDER THE SKIN EVERY NIGHT 7/19/17   Tatianna Cordova MD   lisinopril (PRINIVIL;ZESTRIL) 20 MG tablet TAKE 1 TABLET BY MOUTH DAILY 5/15/17   Tatianna Cordova MD   pravastatin (PRAVACHOL) 80 MG tablet TAKE 1 TABLET BY MOUTH DAILY 5/8/17   Tatianna Cordova MD   Insulin Pen Needle 32G X 4 MM MISC Testing 3 xs daily per 311.9 4/18/16   Tatianna Cordova MD   Multiple Vitamins-Minerals (THERAPEUTIC MULTIVITAMIN-MINERALS) tablet Take 1 tablet by mouth daily.     Historical Provider, MD       Future Appointments  Date Time Provider Sophy Pereira   2/21/2018 4:30 PM Tatianna Cordova MD [facility]

## 2017-12-19 ENCOUNTER — TELEPHONE (OUTPATIENT)
Dept: INTERNAL MEDICINE | Age: 77
End: 2017-12-19

## 2017-12-21 ENCOUNTER — TELEPHONE (OUTPATIENT)
Dept: INTERNAL MEDICINE | Age: 77
End: 2017-12-21

## 2017-12-21 RX ORDER — LISINOPRIL 20 MG/1
TABLET ORAL
Qty: 30 TABLET | Refills: 1 | Status: SHIPPED | OUTPATIENT
Start: 2017-12-21 | End: 2018-02-20 | Stop reason: SDUPTHER

## 2017-12-21 RX ORDER — LAMOTRIGINE 25 MG/1
25 TABLET ORAL DAILY
Qty: 30 TABLET | Refills: 1 | Status: SHIPPED | OUTPATIENT
Start: 2017-12-21 | End: 2018-02-10 | Stop reason: SDUPTHER

## 2017-12-21 RX ORDER — MIRTAZAPINE 15 MG/1
7.5 TABLET, FILM COATED ORAL NIGHTLY
Qty: 30 TABLET | Refills: 1 | Status: SHIPPED | OUTPATIENT
Start: 2017-12-21 | End: 2018-04-13 | Stop reason: SDUPTHER

## 2017-12-21 RX ORDER — PANTOPRAZOLE SODIUM 40 MG/1
40 GRANULE, DELAYED RELEASE ORAL
Qty: 30 EACH | Refills: 1 | Status: SHIPPED | OUTPATIENT
Start: 2017-12-21 | End: 2017-12-27

## 2017-12-21 RX ORDER — TRAZODONE HYDROCHLORIDE 50 MG/1
50 TABLET ORAL NIGHTLY
Qty: 30 TABLET | Refills: 1 | Status: SHIPPED | OUTPATIENT
Start: 2017-12-21 | End: 2018-02-15 | Stop reason: SDUPTHER

## 2017-12-21 NOTE — TELEPHONE ENCOUNTER
Pt needs medication refills on: Lisinopril-Trazadone-Mirtazapine-Pantoprazole-Lamotrigine    Kevin   [phone] in [facility]

## 2017-12-27 ENCOUNTER — TELEPHONE (OUTPATIENT)
Dept: INTERNAL MEDICINE | Age: 77
End: 2017-12-27

## 2017-12-27 RX ORDER — PANTOPRAZOLE SODIUM 40 MG/1
40 TABLET, DELAYED RELEASE ORAL DAILY
Qty: 90 TABLET | Refills: 1 | Status: SHIPPED | OUTPATIENT
Start: 2017-12-27 | End: 2018-02-21

## 2018-01-22 ENCOUNTER — CARE COORDINATION (OUTPATIENT)
Dept: CARE COORDINATION | Age: 78
End: 2018-01-22

## 2018-01-22 NOTE — CARE COORDINATION
RECORD blood sugars & bring to next PCP follow up 2/21/18         Goals Addressed             Most Recent     Care Coordination Self Management (pt-stated)   On track (1/22/2018)             CC Self Management Goal  Patient Goal (What steps will patient take to achieve goal?): A1c <7.0; avoid acute onset episodes r/t poorly controlled DM mgmt  Patient is able to discuss self-management of condition(s):  Pt demonstrates adherence to medications  Pt demonstrates understanding of self-monitoring  Patient is able to identify Red Flags:  Alert to potential adverse drug reactions(s) or side effects and actions to take should they arise  Discuss target symptoms and actions to take should they arise  Identify problems that require immediate PCP or specialist visit  Patient demonstrates understanding of access to PCP/Specialist:  Understands about scheduling routine Follow Up appointments   Understands about sick day appointment options for worsening of symptoms/progression (Same Day, E Visits)       Conditions and Symptoms   On track (1/22/2018)             I will schedule office visits, as directed by my provider. I will keep my appointment or reschedule if I have to cancel. I will notify my provider of any barriers to my plan of care. I will follow my Zone Management tool to seek urgent or emergent care. I will notify my provider of any symptoms that indicate a worsening of my condition. Barriers: impairment:  physical: general malaise, nausea  Plan for overcoming my barriers: will continue to follow w/providers in my care for tx; will follow tx recommendations of providers  Confidence: no numeric value assigned by pt/10  Anticipated Goal Completion Date: 4/30/18              Prior to Admission medications    Medication Sig Start Date End Date Taking?  Authorizing Provider   pantoprazole (PROTONIX) 40 MG tablet Take 1 tablet by mouth daily 12/27/17   Larwance Kawasaki, MD   traZODone (DESYREL) 50 MG tablet Take 1

## 2018-02-01 ENCOUNTER — TELEPHONE (OUTPATIENT)
Dept: INTERNAL MEDICINE | Age: 78
End: 2018-02-01

## 2018-02-01 RX ORDER — PRAVASTATIN SODIUM 80 MG/1
TABLET ORAL
Qty: 90 TABLET | Refills: 0 | Status: SHIPPED | OUTPATIENT
Start: 2018-02-01 | End: 2018-05-05 | Stop reason: SDUPTHER

## 2018-02-08 RX ORDER — INSULIN GLARGINE 100 [IU]/ML
INJECTION, SOLUTION SUBCUTANEOUS
Qty: 10 ML | Refills: 0 | Status: SHIPPED | OUTPATIENT
Start: 2018-02-08 | End: 2018-03-24 | Stop reason: SDUPTHER

## 2018-02-12 RX ORDER — LAMOTRIGINE 25 MG/1
25 TABLET ORAL DAILY
Qty: 30 TABLET | Refills: 0 | Status: SHIPPED | OUTPATIENT
Start: 2018-02-12 | End: 2018-03-20 | Stop reason: SDUPTHER

## 2018-02-15 RX ORDER — LANCETS 30 GAUGE
EACH MISCELLANEOUS
Qty: 100 EACH | Refills: 3 | Status: SHIPPED | OUTPATIENT
Start: 2018-02-15 | End: 2018-03-08 | Stop reason: SDUPTHER

## 2018-02-15 RX ORDER — TRAZODONE HYDROCHLORIDE 50 MG/1
TABLET ORAL
Qty: 30 TABLET | Refills: 0 | Status: SHIPPED | OUTPATIENT
Start: 2018-02-15 | End: 2018-03-20 | Stop reason: SDUPTHER

## 2018-02-20 ENCOUNTER — CARE COORDINATION (OUTPATIENT)
Dept: CARE COORDINATION | Age: 78
End: 2018-02-20

## 2018-02-20 RX ORDER — LISINOPRIL 20 MG/1
TABLET ORAL
Qty: 30 TABLET | Refills: 0 | Status: SHIPPED | OUTPATIENT
Start: 2018-02-20 | End: 2018-03-24 | Stop reason: SDUPTHER

## 2018-02-21 ENCOUNTER — CARE COORDINATION (OUTPATIENT)
Dept: CARE COORDINATION | Age: 78
End: 2018-02-21

## 2018-02-21 ENCOUNTER — OFFICE VISIT (OUTPATIENT)
Dept: INTERNAL MEDICINE | Age: 78
End: 2018-02-21

## 2018-02-21 VITALS
DIASTOLIC BLOOD PRESSURE: 78 MMHG | OXYGEN SATURATION: 98 % | WEIGHT: 186.2 LBS | HEART RATE: 74 BPM | HEIGHT: 66 IN | SYSTOLIC BLOOD PRESSURE: 124 MMHG | BODY MASS INDEX: 29.92 KG/M2

## 2018-02-21 DIAGNOSIS — F33.42 RECURRENT MAJOR DEPRESSIVE EPISODES, IN FULL REMISSION (HCC): ICD-10-CM

## 2018-02-21 DIAGNOSIS — F32.9 REACTIVE DEPRESSION: ICD-10-CM

## 2018-02-21 DIAGNOSIS — I10 ESSENTIAL HYPERTENSION: ICD-10-CM

## 2018-02-21 DIAGNOSIS — E11.9 TYPE 2 DIABETES MELLITUS WITHOUT COMPLICATION, WITHOUT LONG-TERM CURRENT USE OF INSULIN (HCC): ICD-10-CM

## 2018-02-21 DIAGNOSIS — E08.00 DIABETES MELLITUS DUE TO UNDERLYING CONDITION WITH HYPEROSMOLARITY WITHOUT COMA, WITHOUT LONG-TERM CURRENT USE OF INSULIN (HCC): Primary | ICD-10-CM

## 2018-02-21 DIAGNOSIS — E03.4 HYPOTHYROIDISM DUE TO ACQUIRED ATROPHY OF THYROID: ICD-10-CM

## 2018-02-21 PROBLEM — N30.00 ACUTE CYSTITIS WITHOUT HEMATURIA: Status: RESOLVED | Noted: 2017-09-11 | Resolved: 2018-02-21

## 2018-02-21 PROBLEM — R29.6 RECURRENT FALLS: Status: RESOLVED | Noted: 2017-04-17 | Resolved: 2018-02-21

## 2018-02-21 LAB — HBA1C MFR BLD: 11 %

## 2018-02-21 PROCEDURE — G8417 CALC BMI ABV UP PARAM F/U: HCPCS | Performed by: INTERNAL MEDICINE

## 2018-02-21 PROCEDURE — 1090F PRES/ABSN URINE INCON ASSESS: CPT | Performed by: INTERNAL MEDICINE

## 2018-02-21 PROCEDURE — G8427 DOCREV CUR MEDS BY ELIG CLIN: HCPCS | Performed by: INTERNAL MEDICINE

## 2018-02-21 PROCEDURE — 4040F PNEUMOC VAC/ADMIN/RCVD: CPT | Performed by: INTERNAL MEDICINE

## 2018-02-21 PROCEDURE — G8400 PT W/DXA NO RESULTS DOC: HCPCS | Performed by: INTERNAL MEDICINE

## 2018-02-21 PROCEDURE — 83036 HEMOGLOBIN GLYCOSYLATED A1C: CPT | Performed by: INTERNAL MEDICINE

## 2018-02-21 PROCEDURE — 1036F TOBACCO NON-USER: CPT | Performed by: INTERNAL MEDICINE

## 2018-02-21 PROCEDURE — G8484 FLU IMMUNIZE NO ADMIN: HCPCS | Performed by: INTERNAL MEDICINE

## 2018-02-21 PROCEDURE — 99214 OFFICE O/P EST MOD 30 MIN: CPT | Performed by: INTERNAL MEDICINE

## 2018-02-21 PROCEDURE — 1123F ACP DISCUSS/DSCN MKR DOCD: CPT | Performed by: INTERNAL MEDICINE

## 2018-02-21 RX ORDER — OMEPRAZOLE 40 MG/1
40 CAPSULE, DELAYED RELEASE ORAL DAILY
Qty: 30 CAPSULE | Refills: 3
Start: 2018-02-21 | End: 2018-10-30 | Stop reason: SDUPTHER

## 2018-02-21 ASSESSMENT — ENCOUNTER SYMPTOMS
WHEEZING: 0
SHORTNESS OF BREATH: 0
GASTROINTESTINAL NEGATIVE: 1
CHEST TIGHTNESS: 0
RESPIRATORY NEGATIVE: 1

## 2018-02-21 NOTE — LETTER
[address]  [address] [place] [address]  Phone: [phone]  Fax: [phone]    Mich Negron MD        February 21, 2018     Patient: Lynn Precise   YOB: 1940   Date of Visit: 2/21/2018       To Whom It May Concern: It is my medical opinion that Jono Gonzalez requires a disability parking placard for the following reasons:  She cannot walk 200 feet without stopping to rest.  Duration of need: permanent    If you have any questions or concerns, please don't hesitate to call.     Sincerely,          Mich Negron MD

## 2018-02-21 NOTE — CARE COORDINATION
Ambulatory Care Coordination Note  2/21/2018  CM Risk Score: 13  Silas Mortality Risk Score: 10.08    ACC: Mariam Bedoya, RN    Summary Note: reviewed w/pt likeliness she is having more extreme high blood sugars than she is aware evidenced by today's HbA1c POCT 11.0. Pt admits forgetting to take insulin when rushing to go to hair appt or other activity @ [facility]. States Al, nurse [facility] has been instrumental in checking in w/her. She feels she is being much more consistent w/BGM. Verbalizes understanding of s/s to observe/report, but maintains belief she does not experience s/s of hypo or hyperglycemia. Not wearing hearing aids today. Discussed this also - benefits wearing. Pt agrees aids are beneficial, just doesn't feel like bothering with them. Pt was jovial, presenting pleasant demeanor. Sister was present w/her. Pt agrees to be more vigilant in self monitoring/observing in managing DM. Diabetes Assessment    Meal Planning:  Avoidance of concentrated sweets, Carb counting, Plate Method   How often do you test your blood sugar?:  Daily, Meals, Bedtime   Do you have barriers with adherence to non-pharmacologic self-management interventions?  (Nutrition/Exercise/Self-Monitoring):  No   Have you ever had to go to the ED for symptoms of low blood sugar?:  No   What is the date of your last ED visit for low blood sugar?:  8/20/16       No patient-reported symptoms   Do you have hyperglycemia symptoms?:  No   Do you have hypoglycemia symptoms?:  No   Last Blood Sugar Value:  116   Blood Sugar Monitoring Regimen:  Morning Fasting, Before Meals   Blood Sugar Trends:  Fluctuating (Comment:  fasting am; with one outlier of 68, another low 500s; mostly in low 100s )          Care Coordination Interventions    Program Enrollment:  Complex Care  Referral from Primary Care Provider:  No  Suggested Interventions and Community Resources  [facility]  [facility] (Comment: Dr. Mya Alvarez at [facility] nausea  Plan for overcoming my barriers: will continue to follow w/providers in my care for tx; will follow tx recommendations of providers  Confidence: no numeric value assigned by pt/10  Anticipated Goal Completion Date: 4/30/18              Prior to Admission medications    Medication Sig Start Date End Date Taking? Authorizing Provider   omeprazole (PRILOSEC) 40 MG delayed release capsule Take 1 capsule by mouth daily 2/21/18   Nima Bennett MD   lisinopril (PRINIVIL;ZESTRIL) 20 MG tablet TAKE 1 TABLET BY MOUTH DAILY 2/20/18   Nima Bennett MD   traZODone (DESYREL) 50 MG tablet TAKE 1 TABLET BY MOUTH EVERY NIGHT 2/15/18   Nima Bennett MD   Lancets MISC Use daily per E11.9 2/15/18   Nima Bennett MD   lamoTRIgine (LAMICTAL) 25 MG tablet TAKE 1 TABLET BY MOUTH DAILY 2/12/18   Nima Bennett MD   LANTUS 100 UNIT/ML injection vial INJECT 34 UNTIS UNDER THE SKIN AT BEDTIME 2/8/18   Nima Bennett MD   pravastatin (PRAVACHOL) 80 MG tablet TAKE 1 TABLET BY MOUTH DAILY 2/1/18   Nima Bennett MD   mirtazapine (REMERON) 15 MG tablet Take 0.5 tablets by mouth nightly 12/21/17   Nima Bennett MD   diphenhydrAMINE (BENADRYL) 25 MG capsule Take 25 mg by mouth every 6 hours as needed for Itching    Historical Provider, MD   insulin lispro (HUMALOG) 100 UNIT/ML injection vial Inject 1 Units into the skin 3 times daily (before meals) 150-199- 1 unit 200-249- 2 units 250-299- 3 units 300-349- 4 units 350 or > - 8 units    Historical Provider, MD   citalopram (CELEXA) 10 MG tablet Take 1 tablet by mouth daily 11/10/17   Doron Azul CNP   ondansetron (ZOFRAN) 8 MG tablet Take 1 tablet by mouth every 8 hours as needed for Nausea or Vomiting 11/10/17   Doron Azul CNP   Insulin Pen Needle 32G X 4 MM MISC Testing 3 xs daily per 311.9 4/18/16   Nima Bennett MD   Multiple Vitamins-Minerals (THERAPEUTIC MULTIVITAMIN-MINERALS) tablet Take 1 tablet by mouth daily. Historical Provider, MD       Future Appointments  Date Time Provider Sophy Pereira   6/27/2018 4:30 PM Crystal Mast MD [facility]

## 2018-03-08 DIAGNOSIS — E11.9 TYPE 2 DIABETES MELLITUS WITHOUT COMPLICATION, WITHOUT LONG-TERM CURRENT USE OF INSULIN (HCC): Primary | ICD-10-CM

## 2018-03-08 RX ORDER — LANCETS 30 GAUGE
EACH MISCELLANEOUS
Qty: 100 EACH | Refills: 3 | Status: SHIPPED | OUTPATIENT
Start: 2018-03-08 | End: 2018-03-14 | Stop reason: SDUPTHER

## 2018-03-13 ENCOUNTER — TELEPHONE (OUTPATIENT)
Dept: INTERNAL MEDICINE | Age: 78
End: 2018-03-13

## 2018-03-13 DIAGNOSIS — E08.00 DIABETES MELLITUS DUE TO UNDERLYING CONDITION WITH HYPEROSMOLARITY WITHOUT COMA, WITHOUT LONG-TERM CURRENT USE OF INSULIN (HCC): Primary | ICD-10-CM

## 2018-03-14 RX ORDER — GLUCOSAMINE HCL/CHONDROITIN SU 500-400 MG
CAPSULE ORAL
Qty: 100 STRIP | Refills: 2 | Status: SHIPPED | OUTPATIENT
Start: 2018-03-14 | End: 2018-03-26

## 2018-03-14 RX ORDER — LANCETS 30 GAUGE
EACH MISCELLANEOUS
Qty: 100 EACH | Refills: 3 | Status: SHIPPED | OUTPATIENT
Start: 2018-03-14 | End: 2018-03-26 | Stop reason: SDUPTHER

## 2018-03-20 RX ORDER — TRAZODONE HYDROCHLORIDE 50 MG/1
TABLET ORAL
Qty: 30 TABLET | Refills: 0 | Status: SHIPPED | OUTPATIENT
Start: 2018-03-20 | End: 2018-03-20 | Stop reason: SDUPTHER

## 2018-03-20 RX ORDER — TRAZODONE HYDROCHLORIDE 50 MG/1
TABLET ORAL
Qty: 90 TABLET | Refills: 0 | Status: SHIPPED | OUTPATIENT
Start: 2018-03-20 | End: 2018-07-24

## 2018-03-20 RX ORDER — LAMOTRIGINE 25 MG/1
25 TABLET ORAL DAILY
Qty: 30 TABLET | Refills: 0 | Status: SHIPPED | OUTPATIENT
Start: 2018-03-20 | End: 2018-03-20 | Stop reason: SDUPTHER

## 2018-03-20 RX ORDER — LAMOTRIGINE 25 MG/1
25 TABLET ORAL DAILY
Qty: 90 TABLET | Refills: 0 | Status: SHIPPED | OUTPATIENT
Start: 2018-03-20 | End: 2018-07-19

## 2018-03-21 ENCOUNTER — TELEPHONE (OUTPATIENT)
Dept: INTERNAL MEDICINE | Age: 78
End: 2018-03-21

## 2018-03-22 ENCOUNTER — TELEPHONE (OUTPATIENT)
Dept: INTERNAL MEDICINE | Age: 78
End: 2018-03-22

## 2018-03-22 NOTE — TELEPHONE ENCOUNTER
No change in blood sugar, occasional loose stools but only 1 or twice a month  Not trying to lose weight  You can leave a detailed message, it is a secure line

## 2018-03-26 DIAGNOSIS — E08.00 DIABETES MELLITUS DUE TO UNDERLYING CONDITION WITH HYPEROSMOLARITY WITHOUT COMA, WITHOUT LONG-TERM CURRENT USE OF INSULIN (HCC): ICD-10-CM

## 2018-03-26 RX ORDER — LISINOPRIL 20 MG/1
TABLET ORAL
Qty: 90 TABLET | Refills: 0 | Status: SHIPPED | OUTPATIENT
Start: 2018-03-26 | End: 2018-06-16 | Stop reason: SDUPTHER

## 2018-03-26 RX ORDER — LANCETS 30 GAUGE
EACH MISCELLANEOUS
Qty: 100 EACH | Refills: 3 | Status: SHIPPED | OUTPATIENT
Start: 2018-03-26 | End: 2018-12-18 | Stop reason: SDUPTHER

## 2018-03-26 RX ORDER — INSULIN GLARGINE 100 [IU]/ML
INJECTION, SOLUTION SUBCUTANEOUS
Qty: 10 ML | Refills: 0 | Status: SHIPPED | OUTPATIENT
Start: 2018-03-26 | End: 2018-05-09 | Stop reason: SDUPTHER

## 2018-04-09 ENCOUNTER — TELEPHONE (OUTPATIENT)
Dept: INTERNAL MEDICINE | Age: 78
End: 2018-04-09

## 2018-04-11 ENCOUNTER — OFFICE VISIT (OUTPATIENT)
Dept: INTERNAL MEDICINE | Age: 78
End: 2018-04-11

## 2018-04-11 ENCOUNTER — CARE COORDINATION (OUTPATIENT)
Dept: CARE COORDINATION | Age: 78
End: 2018-04-11

## 2018-04-11 VITALS
DIASTOLIC BLOOD PRESSURE: 76 MMHG | HEART RATE: 80 BPM | SYSTOLIC BLOOD PRESSURE: 122 MMHG | WEIGHT: 189 LBS | BODY MASS INDEX: 30.51 KG/M2 | OXYGEN SATURATION: 96 %

## 2018-04-11 DIAGNOSIS — N39.0 URINARY TRACT INFECTION WITHOUT HEMATURIA, SITE UNSPECIFIED: Primary | ICD-10-CM

## 2018-04-11 DIAGNOSIS — R35.0 URINARY FREQUENCY: ICD-10-CM

## 2018-04-11 DIAGNOSIS — E08.00 DIABETES MELLITUS DUE TO UNDERLYING CONDITION WITH HYPEROSMOLARITY WITHOUT COMA, WITHOUT LONG-TERM CURRENT USE OF INSULIN (HCC): ICD-10-CM

## 2018-04-11 LAB
BILIRUBIN, POC: NORMAL
BLOOD URINE, POC: NORMAL
CLARITY, POC: CLEAR
COLOR, POC: YELLOW
GLUCOSE URINE, POC: NORMAL
HBA1C MFR BLD: 11.3 %
KETONES, POC: NORMAL
LEUKOCYTE EST, POC: NORMAL
NITRITE, POC: NORMAL
PH, POC: 6.5
PROTEIN, POC: NORMAL
SPECIFIC GRAVITY, POC: 1
UROBILINOGEN, POC: NORMAL

## 2018-04-11 PROCEDURE — 1090F PRES/ABSN URINE INCON ASSESS: CPT | Performed by: INTERNAL MEDICINE

## 2018-04-11 PROCEDURE — 81002 URINALYSIS NONAUTO W/O SCOPE: CPT | Performed by: INTERNAL MEDICINE

## 2018-04-11 PROCEDURE — G8417 CALC BMI ABV UP PARAM F/U: HCPCS | Performed by: INTERNAL MEDICINE

## 2018-04-11 PROCEDURE — 1123F ACP DISCUSS/DSCN MKR DOCD: CPT | Performed by: INTERNAL MEDICINE

## 2018-04-11 PROCEDURE — G8427 DOCREV CUR MEDS BY ELIG CLIN: HCPCS | Performed by: INTERNAL MEDICINE

## 2018-04-11 PROCEDURE — G8400 PT W/DXA NO RESULTS DOC: HCPCS | Performed by: INTERNAL MEDICINE

## 2018-04-11 PROCEDURE — 83036 HEMOGLOBIN GLYCOSYLATED A1C: CPT | Performed by: INTERNAL MEDICINE

## 2018-04-11 PROCEDURE — 99213 OFFICE O/P EST LOW 20 MIN: CPT | Performed by: INTERNAL MEDICINE

## 2018-04-11 PROCEDURE — 4040F PNEUMOC VAC/ADMIN/RCVD: CPT | Performed by: INTERNAL MEDICINE

## 2018-04-11 PROCEDURE — 1036F TOBACCO NON-USER: CPT | Performed by: INTERNAL MEDICINE

## 2018-04-11 ASSESSMENT — ENCOUNTER SYMPTOMS
SHORTNESS OF BREATH: 0
GASTROINTESTINAL NEGATIVE: 1
CHEST TIGHTNESS: 0
WHEEZING: 0
RESPIRATORY NEGATIVE: 1

## 2018-04-13 RX ORDER — MIRTAZAPINE 15 MG/1
TABLET, FILM COATED ORAL
Qty: 45 TABLET | Refills: 0 | Status: SHIPPED | OUTPATIENT
Start: 2018-04-13 | End: 2018-07-06 | Stop reason: SDUPTHER

## 2018-04-14 LAB
ORGANISM: ABNORMAL
URINE CULTURE, ROUTINE: ABNORMAL
URINE CULTURE, ROUTINE: ABNORMAL

## 2018-04-17 RX ORDER — SULFAMETHOXAZOLE AND TRIMETHOPRIM 800; 160 MG/1; MG/1
1 TABLET ORAL 2 TIMES DAILY
Qty: 14 TABLET | Refills: 0 | Status: SHIPPED | OUTPATIENT
Start: 2018-04-17 | End: 2018-04-24

## 2018-04-19 ENCOUNTER — TELEPHONE (OUTPATIENT)
Dept: INTERNAL MEDICINE | Age: 78
End: 2018-04-19

## 2018-05-07 RX ORDER — PRAVASTATIN SODIUM 80 MG/1
TABLET ORAL
Qty: 90 TABLET | Refills: 0 | Status: SHIPPED | OUTPATIENT
Start: 2018-05-07 | End: 2018-08-06 | Stop reason: SDUPTHER

## 2018-05-09 RX ORDER — INSULIN GLARGINE 100 [IU]/ML
INJECTION, SOLUTION SUBCUTANEOUS
Qty: 10 ML | Refills: 0 | Status: SHIPPED | OUTPATIENT
Start: 2018-05-09 | End: 2018-07-10 | Stop reason: SDUPTHER

## 2018-05-21 ENCOUNTER — TELEPHONE (OUTPATIENT)
Dept: INTERNAL MEDICINE | Age: 78
End: 2018-05-21

## 2018-06-04 ENCOUNTER — CARE COORDINATION (OUTPATIENT)
Dept: CARE COORDINATION | Age: 78
End: 2018-06-04

## 2018-06-18 RX ORDER — TRAZODONE HYDROCHLORIDE 50 MG/1
TABLET ORAL
Qty: 90 TABLET | Refills: 0 | OUTPATIENT
Start: 2018-06-18

## 2018-06-18 RX ORDER — LISINOPRIL 20 MG/1
TABLET ORAL
Qty: 90 TABLET | Refills: 0 | Status: SHIPPED | OUTPATIENT
Start: 2018-06-18 | End: 2018-09-22 | Stop reason: SDUPTHER

## 2018-06-18 RX ORDER — LAMOTRIGINE 25 MG/1
25 TABLET ORAL DAILY
Qty: 90 TABLET | Refills: 0 | OUTPATIENT
Start: 2018-06-18

## 2018-06-18 RX ORDER — PANTOPRAZOLE SODIUM 40 MG/1
40 TABLET, DELAYED RELEASE ORAL DAILY
Qty: 90 TABLET | Refills: 0 | Status: SHIPPED | OUTPATIENT
Start: 2018-06-18 | End: 2018-09-22 | Stop reason: SDUPTHER

## 2018-06-25 LAB — DIABETIC RETINOPATHY: NORMAL

## 2018-06-27 ENCOUNTER — OFFICE VISIT (OUTPATIENT)
Dept: INTERNAL MEDICINE | Age: 78
End: 2018-06-27

## 2018-06-27 ENCOUNTER — CARE COORDINATION (OUTPATIENT)
Dept: CARE COORDINATION | Age: 78
End: 2018-06-27

## 2018-06-27 VITALS
RESPIRATION RATE: 14 BRPM | BODY MASS INDEX: 31.34 KG/M2 | OXYGEN SATURATION: 97 % | SYSTOLIC BLOOD PRESSURE: 120 MMHG | WEIGHT: 195 LBS | HEIGHT: 66 IN | DIASTOLIC BLOOD PRESSURE: 78 MMHG | HEART RATE: 79 BPM

## 2018-06-27 DIAGNOSIS — E55.9 VITAMIN D DEFICIENCY: ICD-10-CM

## 2018-06-27 DIAGNOSIS — E08.00 DIABETES MELLITUS DUE TO UNDERLYING CONDITION WITH HYPEROSMOLARITY WITHOUT COMA, WITHOUT LONG-TERM CURRENT USE OF INSULIN (HCC): Primary | ICD-10-CM

## 2018-06-27 DIAGNOSIS — F32.9 REACTIVE DEPRESSION: ICD-10-CM

## 2018-06-27 DIAGNOSIS — E03.4 HYPOTHYROIDISM DUE TO ACQUIRED ATROPHY OF THYROID: ICD-10-CM

## 2018-06-27 DIAGNOSIS — I10 ESSENTIAL HYPERTENSION: ICD-10-CM

## 2018-06-27 PROBLEM — R10.84 GENERALIZED ABDOMINAL PAIN: Status: RESOLVED | Noted: 2017-09-19 | Resolved: 2018-06-27

## 2018-06-27 LAB — DIABETIC RETINOPATHY: NORMAL

## 2018-06-27 PROCEDURE — 99214 OFFICE O/P EST MOD 30 MIN: CPT | Performed by: INTERNAL MEDICINE

## 2018-06-27 PROCEDURE — 1036F TOBACCO NON-USER: CPT | Performed by: INTERNAL MEDICINE

## 2018-06-27 PROCEDURE — 1090F PRES/ABSN URINE INCON ASSESS: CPT | Performed by: INTERNAL MEDICINE

## 2018-06-27 PROCEDURE — G8417 CALC BMI ABV UP PARAM F/U: HCPCS | Performed by: INTERNAL MEDICINE

## 2018-06-27 PROCEDURE — 4040F PNEUMOC VAC/ADMIN/RCVD: CPT | Performed by: INTERNAL MEDICINE

## 2018-06-27 PROCEDURE — 1123F ACP DISCUSS/DSCN MKR DOCD: CPT | Performed by: INTERNAL MEDICINE

## 2018-06-27 PROCEDURE — G8427 DOCREV CUR MEDS BY ELIG CLIN: HCPCS | Performed by: INTERNAL MEDICINE

## 2018-06-27 PROCEDURE — G8400 PT W/DXA NO RESULTS DOC: HCPCS | Performed by: INTERNAL MEDICINE

## 2018-06-27 ASSESSMENT — ENCOUNTER SYMPTOMS
SHORTNESS OF BREATH: 0
WHEEZING: 0
RESPIRATORY NEGATIVE: 1
GASTROINTESTINAL NEGATIVE: 1
CHEST TIGHTNESS: 0

## 2018-07-09 RX ORDER — MIRTAZAPINE 15 MG/1
TABLET, FILM COATED ORAL
Qty: 45 TABLET | Refills: 0 | Status: SHIPPED | OUTPATIENT
Start: 2018-07-09 | End: 2019-01-08 | Stop reason: SDUPTHER

## 2018-07-09 RX ORDER — MIRTAZAPINE 15 MG/1
TABLET, FILM COATED ORAL
Qty: 45 TABLET | Refills: 0 | Status: SHIPPED | OUTPATIENT
Start: 2018-07-09 | End: 2018-07-09 | Stop reason: SDUPTHER

## 2018-07-10 RX ORDER — INSULIN GLARGINE 100 [IU]/ML
INJECTION, SOLUTION SUBCUTANEOUS
Qty: 10 ML | Refills: 0 | Status: SHIPPED | OUTPATIENT
Start: 2018-07-10 | End: 2018-08-08 | Stop reason: SDUPTHER

## 2018-07-17 ENCOUNTER — TELEPHONE (OUTPATIENT)
Dept: INTERNAL MEDICINE | Age: 78
End: 2018-07-17

## 2018-07-17 NOTE — TELEPHONE ENCOUNTER
[facility] needs clarification on when to check blood sugar  Order says check blood sugar at each meal and at bedtime

## 2018-07-19 RX ORDER — LAMOTRIGINE 25 MG/1
25 TABLET ORAL DAILY
Qty: 90 TABLET | Refills: 0 | Status: SHIPPED | OUTPATIENT
Start: 2018-07-19 | End: 2018-08-13

## 2018-07-19 RX ORDER — LAMOTRIGINE 25 MG/1
25 TABLET ORAL DAILY
Qty: 30 TABLET | Refills: 0 | Status: SHIPPED | OUTPATIENT
Start: 2018-07-19 | End: 2018-07-19 | Stop reason: SDUPTHER

## 2018-07-24 RX ORDER — TRAZODONE HYDROCHLORIDE 50 MG/1
TABLET ORAL
Qty: 30 TABLET | Refills: 0 | Status: SHIPPED | OUTPATIENT
Start: 2018-07-24 | End: 2018-07-24 | Stop reason: SDUPTHER

## 2018-07-24 RX ORDER — TRAZODONE HYDROCHLORIDE 50 MG/1
TABLET ORAL
Qty: 90 TABLET | Refills: 0 | Status: SHIPPED | OUTPATIENT
Start: 2018-07-24 | End: 2018-11-26 | Stop reason: SDUPTHER

## 2018-08-06 RX ORDER — PRAVASTATIN SODIUM 80 MG/1
TABLET ORAL
Qty: 90 TABLET | Refills: 0 | Status: SHIPPED | OUTPATIENT
Start: 2018-08-06 | End: 2018-11-03 | Stop reason: SDUPTHER

## 2018-08-08 RX ORDER — INSULIN GLARGINE 100 [IU]/ML
INJECTION, SOLUTION SUBCUTANEOUS
Qty: 10 ML | Refills: 0 | Status: SHIPPED | OUTPATIENT
Start: 2018-08-08 | End: 2018-09-27 | Stop reason: SDUPTHER

## 2018-08-08 RX ORDER — INSULIN LISPRO 100 [IU]/ML
INJECTION, SOLUTION INTRAVENOUS; SUBCUTANEOUS
Qty: 45 ML | Refills: 0 | Status: ON HOLD | OUTPATIENT
Start: 2018-08-08 | End: 2019-02-01 | Stop reason: HOSPADM

## 2018-08-13 DIAGNOSIS — E03.4 HYPOTHYROIDISM DUE TO ACQUIRED ATROPHY OF THYROID: ICD-10-CM

## 2018-08-13 DIAGNOSIS — I10 ESSENTIAL HYPERTENSION: ICD-10-CM

## 2018-08-13 DIAGNOSIS — E55.9 VITAMIN D DEFICIENCY: ICD-10-CM

## 2018-08-13 DIAGNOSIS — E08.00 DIABETES MELLITUS DUE TO UNDERLYING CONDITION WITH HYPEROSMOLARITY WITHOUT COMA, WITHOUT LONG-TERM CURRENT USE OF INSULIN (HCC): ICD-10-CM

## 2018-08-13 LAB
A/G RATIO: 1.7 (ref 1.1–2.2)
ALBUMIN SERPL-MCNC: 4.3 G/DL (ref 3.4–5)
ALP BLD-CCNC: 102 U/L (ref 40–129)
ALT SERPL-CCNC: 19 U/L (ref 10–40)
ANION GAP SERPL CALCULATED.3IONS-SCNC: 16 MMOL/L (ref 3–16)
AST SERPL-CCNC: 21 U/L (ref 15–37)
BASOPHILS ABSOLUTE: 0.1 K/UL (ref 0–0.2)
BASOPHILS RELATIVE PERCENT: 0.8 %
BILIRUB SERPL-MCNC: <0.2 MG/DL (ref 0–1)
BILIRUBIN URINE: NEGATIVE
BLOOD, URINE: NEGATIVE
BUN BLDV-MCNC: 22 MG/DL (ref 7–20)
CALCIUM SERPL-MCNC: 9.4 MG/DL (ref 8.3–10.6)
CHLORIDE BLD-SCNC: 101 MMOL/L (ref 99–110)
CHOLESTEROL, TOTAL: 179 MG/DL (ref 0–199)
CLARITY: CLEAR
CO2: 23 MMOL/L (ref 21–32)
COLOR: YELLOW
CREAT SERPL-MCNC: 1.3 MG/DL (ref 0.6–1.2)
CREATININE URINE: 146.2 MG/DL (ref 28–259)
EOSINOPHILS ABSOLUTE: 0.2 K/UL (ref 0–0.6)
EOSINOPHILS RELATIVE PERCENT: 3.1 %
EPITHELIAL CELLS, UA: 6 /HPF (ref 0–5)
GFR AFRICAN AMERICAN: 48
GFR NON-AFRICAN AMERICAN: 40
GLOBULIN: 2.6 G/DL
GLUCOSE BLD-MCNC: 218 MG/DL (ref 70–99)
GLUCOSE URINE: NEGATIVE MG/DL
HCT VFR BLD CALC: 38 % (ref 36–48)
HDLC SERPL-MCNC: 42 MG/DL (ref 40–60)
HEMOGLOBIN: 12.6 G/DL (ref 12–16)
HYALINE CASTS: 1 /LPF (ref 0–8)
KETONES, URINE: NEGATIVE MG/DL
LDL CHOLESTEROL CALCULATED: 98 MG/DL
LEUKOCYTE ESTERASE, URINE: ABNORMAL
LYMPHOCYTES ABSOLUTE: 1.6 K/UL (ref 1–5.1)
LYMPHOCYTES RELATIVE PERCENT: 25.7 %
MCH RBC QN AUTO: 32 PG (ref 26–34)
MCHC RBC AUTO-ENTMCNC: 33.2 G/DL (ref 31–36)
MCV RBC AUTO: 96.5 FL (ref 80–100)
MICROALBUMIN UR-MCNC: 18.3 MG/DL
MICROALBUMIN/CREAT UR-RTO: 125.2 MG/G (ref 0–30)
MICROSCOPIC EXAMINATION: YES
MONOCYTES ABSOLUTE: 0.5 K/UL (ref 0–1.3)
MONOCYTES RELATIVE PERCENT: 7.4 %
NEUTROPHILS ABSOLUTE: 3.9 K/UL (ref 1.7–7.7)
NEUTROPHILS RELATIVE PERCENT: 63 %
NITRITE, URINE: NEGATIVE
PDW BLD-RTO: 14 % (ref 12.4–15.4)
PH UA: 6
PLATELET # BLD: 321 K/UL (ref 135–450)
PMV BLD AUTO: 8.2 FL (ref 5–10.5)
POTASSIUM SERPL-SCNC: 5.1 MMOL/L (ref 3.5–5.1)
PROTEIN UA: 30 MG/DL
RBC # BLD: 3.93 M/UL (ref 4–5.2)
RBC UA: 2 /HPF (ref 0–4)
SODIUM BLD-SCNC: 140 MMOL/L (ref 136–145)
SPECIFIC GRAVITY UA: 1.01
TOTAL PROTEIN: 6.9 G/DL (ref 6.4–8.2)
TRIGL SERPL-MCNC: 195 MG/DL (ref 0–150)
TSH SERPL DL<=0.05 MIU/L-ACNC: 70.92 UIU/ML (ref 0.27–4.2)
UROBILINOGEN, URINE: 0.2 E.U./DL
VITAMIN D 25-HYDROXY: 44.8 NG/ML
VLDLC SERPL CALC-MCNC: 39 MG/DL
WBC # BLD: 6.2 K/UL (ref 4–11)
WBC UA: 10 /HPF (ref 0–5)

## 2018-08-13 RX ORDER — LAMOTRIGINE 25 MG/1
25 TABLET ORAL DAILY
Qty: 30 TABLET | Refills: 0 | Status: SHIPPED | OUTPATIENT
Start: 2018-08-13 | End: 2018-11-26

## 2018-08-14 LAB
ESTIMATED AVERAGE GLUCOSE: 257.5 MG/DL
HBA1C MFR BLD: 10.6 %

## 2018-08-15 LAB — VITAMIN D 1,25-DIHYDROXY: 33.3 PG/ML (ref 19.9–79.3)

## 2018-08-21 DIAGNOSIS — E03.4 HYPOTHYROIDISM DUE TO ACQUIRED ATROPHY OF THYROID: Primary | ICD-10-CM

## 2018-08-21 RX ORDER — LEVOTHYROXINE SODIUM 25 MCG
25 TABLET ORAL DAILY
Qty: 30 TABLET | Refills: 1 | Status: SHIPPED | OUTPATIENT
Start: 2018-08-21 | End: 2018-10-25 | Stop reason: SDUPTHER

## 2018-08-24 ENCOUNTER — CARE COORDINATION (OUTPATIENT)
Dept: CARE COORDINATION | Age: 78
End: 2018-08-24

## 2018-08-24 NOTE — CARE COORDINATION
Ambulatory Care Coordination Note- update Plan of Care  8/24/2018  CM Risk Score: 6  Silas Mortality Risk Score: 4.73    ACC: Casimiro Parker, RN    Summary Note: reports she has not checked BS since Monday d/t ran out of test strips. Has been taking basal insulin as prescribed, takes mealtime insulin based on how she feels. Test strips reportedly reordered Monday. States she now orders test strips from [employer], and was told it will take 5 days to deliver. Readily verbalized understanding of dangers of taking insulin without monitoring blood sugars, especially noting pt MUST know blood sugar in order to adm appropriate dose of insulin with meals. Reviewed also risk for falls. Pt verbalized understanding. Action:  Immediate call placed to nursing @ [facility]. Spoke w/Al, nurse on duty. Al notes pt has been self adjusting insulin based on how she feels vs routinely taking as prescribed and he is now preparing a report/form to fax for PCP review/signature r/t med adm. Per Al, pt BS ranges have been ; 10 blood sugars >300 this month  Al cannot confirm pt truly ordered strips on Monday, he has the contact information pt provided to him & plans to call to confirm. Al further shared [facility] nursing plan is to use [facility] glucometer over the weekend to monitor pt blood sugars, but that this must be short time plan only d/t severity of situation & cannot remain plan without pt agreement to allow [facility] to oversight med mgmt. Al further shares intent to call to confirm actual order for test strips occurred on Monday & attempt to learn anticipated date of delivery. If test strips anticipated to be much further in delay through the mail order - he will seek a one time fill by PCP authorization [facility] [place] [facility]. Plan:  Al plans to forward to PCP form for appropriate medication level for PCP review/approval.   Diabetes Assessment    Meal Planning: Flags:  Alert to potential adverse drug reactions(s) or side effects and actions to take should they arise  Discuss target symptoms and actions to take should they arise  Identify problems that require immediate PCP or specialist visit  Patient demonstrates understanding of access to PCP/Specialist:  Understands about scheduling routine Follow Up appointments   Understands about sick day appointment options for worsening of symptoms/progression (Same Day, E Visits)       Reduce Falls  (pt-stated)   On track (8/24/2018)             I will reduce my risk of falls by the following: precautions w/transitions    Barriers: fear of failure  Plan for overcoming my barriers: extended pauses between transitions; use of DME as appropriate  Confidence: 10/10  Anticipated Goal Completion Date: 9/4/18            Prior to Admission medications    Medication Sig Start Date End Date Taking?  Authorizing Provider   SYNTHROID 25 MCG tablet Take 1 tablet by mouth Daily Labs in 6 weeks 8/21/18   Darrick Poe MD   lamoTRIgine (LAMICTAL) 25 MG tablet TAKE 1 TABLET BY MOUTH DAILY 8/13/18   Darrick Poe MD   HUMALOG KWIKPEN 100 UNIT/ML pen INJECT A MAXIMUM OF 50 UNITS PER SLIDING SCALE UNDER THE SKIN DAILY 8/8/18   Moseley Shallow, APRN - CNP   LANTUS 100 UNIT/ML injection vial ADMINISTER 34 UNITS UNDER THE SKIN AT BEDTIME 8/8/18   Moseley Shallow, APRN - CNP   pravastatin (PRAVACHOL) 80 MG tablet TAKE 1 TABLET BY MOUTH DAILY 8/6/18   Moseley Shallow, APRN - CNP   traZODone (DESYREL) 50 MG tablet TAKE 1 TABLET BY MOUTH EVERY NIGHT 7/24/18   Darrick Poe MD   mirtazapine (REMERON) 15 MG tablet TAKE 1/2 TABLET BY MOUTH EVERY NIGHT AT BEDTIME 7/9/18   Darrick Poe MD   lisinopril (PRINIVIL;ZESTRIL) 20 MG tablet TAKE 1 TABLET BY MOUTH DAILY 6/18/18   Darrick Poe MD   pantoprazole (PROTONIX) 40 MG tablet TAKE 1 TABLET BY MOUTH DAILY 6/18/18   Darrick Poe MD   Lancets MISC Use daily per E11.9 3/26/18   Padilla Dinh MD   glucose blood VI test strips (ASCENSIA AUTODISC VI;ONE TOUCH ULTRA TEST VI) strip 1 each by In Vitro route daily As needed. Per E11.9 3/26/18   Padilla Dinh MD   Blood Glucose Monitoring Suppl MARIBETH 1 each by Does not apply route 2 times daily 3/8/18   Padilla Dinh MD   omeprazole (PRILOSEC) 40 MG delayed release capsule Take 1 capsule by mouth daily 2/21/18   Padilla Dinh MD   diphenhydrAMINE (BENADRYL) 25 MG capsule Take 25 mg by mouth every 6 hours as needed for Itching    Historical Provider, MD   insulin lispro (HUMALOG) 100 UNIT/ML injection vial Inject 1 Units into the skin 3 times daily (before meals) 150-199- 1 unit 200-249- 2 units 250-299- 3 units 300-349- 4 units 350 or > - 8 units    Historical Provider, MD   citalopram (CELEXA) 10 MG tablet Take 1 tablet by mouth daily 11/10/17   Johnathan Halsted, APRN - CNP   ondansetron (ZOFRAN) 8 MG tablet Take 1 tablet by mouth every 8 hours as needed for Nausea or Vomiting 11/10/17   Johnathan Halsted, APRN - CNP   Insulin Pen Needle 32G X 4 MM MISC Testing 3 xs daily per 311.9 4/18/16   Padilla Dinh MD   Multiple Vitamins-Minerals (THERAPEUTIC MULTIVITAMIN-MINERALS) tablet Take 1 tablet by mouth daily.     Historical Provider, MD       Future Appointments  Date Time Provider Sophy Pereira   10/30/2018 2:15 PM Padilla Dinh MD [facility]

## 2018-08-29 ENCOUNTER — TELEPHONE (OUTPATIENT)
Dept: INTERNAL MEDICINE | Age: 78
End: 2018-08-29

## 2018-09-07 ENCOUNTER — CARE COORDINATION (OUTPATIENT)
Dept: CARE COORDINATION | Age: 78
End: 2018-09-07

## 2018-09-07 NOTE — CARE COORDINATION
Provider   SYNTHROID 25 MCG tablet Take 1 tablet by mouth Daily Labs in 6 weeks 8/21/18   Norberto Warren MD   lamoTRIgine (LAMICTAL) 25 MG tablet TAKE 1 TABLET BY MOUTH DAILY 8/13/18   Norberto Warren MD   HUMALOG KWIKPEN 100 UNIT/ML pen INJECT A MAXIMUM OF 50 UNITS PER SLIDING SCALE UNDER THE SKIN DAILY 8/8/18   ANGEL Rey CNP   LANTUS 100 UNIT/ML injection vial ADMINISTER 34 UNITS UNDER THE SKIN AT BEDTIME 8/8/18   ANGEL Rey CNP   pravastatin (PRAVACHOL) 80 MG tablet TAKE 1 TABLET BY MOUTH DAILY 8/6/18   ANGEL Rey CNP   traZODone (DESYREL) 50 MG tablet TAKE 1 TABLET BY MOUTH EVERY NIGHT 7/24/18   Norberto Warren MD   mirtazapine (REMERON) 15 MG tablet TAKE 1/2 TABLET BY MOUTH EVERY NIGHT AT BEDTIME 7/9/18   Norberto Warren MD   lisinopril (PRINIVIL;ZESTRIL) 20 MG tablet TAKE 1 TABLET BY MOUTH DAILY 6/18/18   Norberto Warren MD   pantoprazole (PROTONIX) 40 MG tablet TAKE 1 TABLET BY MOUTH DAILY 6/18/18   Norberto Warren MD   Lancets MISC Use daily per E11.9 3/26/18   Norberto Warren MD   glucose blood VI test strips (ASCENSIA AUTODISC VI;ONE TOUCH ULTRA TEST VI) strip 1 each by In Vitro route daily As needed.  Per E11.9 3/26/18   Norberto Warren MD   Blood Glucose Monitoring Suppl MARIBETH 1 each by Does not apply route 2 times daily 3/8/18   Norberto Warren MD   omeprazole (PRILOSEC) 40 MG delayed release capsule Take 1 capsule by mouth daily 2/21/18   Norberto Warren MD   diphenhydrAMINE (BENADRYL) 25 MG capsule Take 25 mg by mouth every 6 hours as needed for Itching    Historical Provider, MD   insulin lispro (HUMALOG) 100 UNIT/ML injection vial Inject 1 Units into the skin 3 times daily (before meals) 150-199- 1 unit 200-249- 2 units 250-299- 3 units 300-349- 4 units 350 or > - 8 units    Historical Provider, MD   citalopram (CELEXA) 10 MG tablet Take 1 tablet by mouth daily 11/10/17   ANGEL Rey - CNP   ondansetron (ZOFRAN) 8 MG tablet Take 1 tablet by mouth every 8 hours as needed for Nausea or Vomiting 11/10/17   ANGEL Maier - CNP   Insulin Pen Needle 32G X 4 MM MISC Testing 3 xs daily per 311.9 4/18/16   Ricky Nance MD   Multiple Vitamins-Minerals (THERAPEUTIC MULTIVITAMIN-MINERALS) tablet Take 1 tablet by mouth daily.     Historical Provider, MD       Future Appointments  Date Time Provider Sophy Pereira   10/30/2018 2:15 PM Ricky Nance MD [facility]

## 2018-09-18 ENCOUNTER — TELEPHONE (OUTPATIENT)
Dept: INTERNAL MEDICINE | Age: 78
End: 2018-09-18

## 2018-09-18 NOTE — TELEPHONE ENCOUNTER
Karen Vann from [facility] is asking for a verbal order so the pt can have skilled nursing care, PT, and OT

## 2018-09-24 RX ORDER — LISINOPRIL 20 MG/1
TABLET ORAL
Qty: 90 TABLET | Refills: 0 | Status: SHIPPED | OUTPATIENT
Start: 2018-09-24 | End: 2018-12-21 | Stop reason: SDUPTHER

## 2018-09-24 RX ORDER — PANTOPRAZOLE SODIUM 40 MG/1
40 TABLET, DELAYED RELEASE ORAL DAILY
Qty: 90 TABLET | Refills: 0 | Status: SHIPPED | OUTPATIENT
Start: 2018-09-24 | End: 2018-10-30

## 2018-09-27 RX ORDER — INSULIN GLARGINE 100 [IU]/ML
INJECTION, SOLUTION SUBCUTANEOUS
Qty: 10 ML | Refills: 0 | Status: SHIPPED | OUTPATIENT
Start: 2018-09-27 | End: 2018-10-18 | Stop reason: SDUPTHER

## 2018-10-12 ENCOUNTER — TELEPHONE (OUTPATIENT)
Dept: INTERNAL MEDICINE CLINIC | Age: 78
End: 2018-10-12

## 2018-10-18 RX ORDER — CALCIUM CARB/VITAMIN D3/VIT K1 500-100-40
1 TABLET,CHEWABLE ORAL DAILY
Qty: 100 EACH | Refills: 3 | Status: SHIPPED | OUTPATIENT
Start: 2018-10-18 | End: 2019-02-04 | Stop reason: SDUPTHER

## 2018-10-18 RX ORDER — INSULIN GLARGINE 100 [IU]/ML
INJECTION, SOLUTION SUBCUTANEOUS
Qty: 10 ML | Refills: 0 | Status: ON HOLD | OUTPATIENT
Start: 2018-10-18 | End: 2019-02-01 | Stop reason: HOSPADM

## 2018-10-25 DIAGNOSIS — E03.4 HYPOTHYROIDISM DUE TO ACQUIRED ATROPHY OF THYROID: ICD-10-CM

## 2018-10-26 RX ORDER — LEVOTHYROXINE SODIUM 25 MCG
TABLET ORAL
Qty: 30 TABLET | Refills: 0 | Status: SHIPPED | OUTPATIENT
Start: 2018-10-26 | End: 2018-10-31

## 2018-10-30 ENCOUNTER — OFFICE VISIT (OUTPATIENT)
Dept: INTERNAL MEDICINE CLINIC | Age: 78
End: 2018-10-30
Payer: MEDICARE

## 2018-10-30 ENCOUNTER — CARE COORDINATION (OUTPATIENT)
Dept: CARE COORDINATION | Age: 78
End: 2018-10-30

## 2018-10-30 VITALS
HEART RATE: 80 BPM | SYSTOLIC BLOOD PRESSURE: 130 MMHG | DIASTOLIC BLOOD PRESSURE: 78 MMHG | BODY MASS INDEX: 30.51 KG/M2 | WEIGHT: 189 LBS

## 2018-10-30 DIAGNOSIS — I10 ESSENTIAL HYPERTENSION: ICD-10-CM

## 2018-10-30 DIAGNOSIS — K22.70 BARRETT'S ESOPHAGUS WITHOUT DYSPLASIA: ICD-10-CM

## 2018-10-30 DIAGNOSIS — E08.00 DIABETES MELLITUS DUE TO UNDERLYING CONDITION WITH HYPEROSMOLARITY WITHOUT COMA, WITHOUT LONG-TERM CURRENT USE OF INSULIN (HCC): Primary | ICD-10-CM

## 2018-10-30 DIAGNOSIS — E03.4 HYPOTHYROIDISM DUE TO ACQUIRED ATROPHY OF THYROID: ICD-10-CM

## 2018-10-30 DIAGNOSIS — E08.00 DIABETES MELLITUS DUE TO UNDERLYING CONDITION WITH HYPEROSMOLARITY WITHOUT COMA, WITHOUT LONG-TERM CURRENT USE OF INSULIN (HCC): ICD-10-CM

## 2018-10-30 LAB — TSH SERPL DL<=0.05 MIU/L-ACNC: 8.59 UIU/ML (ref 0.27–4.2)

## 2018-10-30 PROCEDURE — G8417 CALC BMI ABV UP PARAM F/U: HCPCS | Performed by: INTERNAL MEDICINE

## 2018-10-30 PROCEDURE — 1036F TOBACCO NON-USER: CPT | Performed by: INTERNAL MEDICINE

## 2018-10-30 PROCEDURE — 99214 OFFICE O/P EST MOD 30 MIN: CPT | Performed by: INTERNAL MEDICINE

## 2018-10-30 PROCEDURE — 4040F PNEUMOC VAC/ADMIN/RCVD: CPT | Performed by: INTERNAL MEDICINE

## 2018-10-30 PROCEDURE — 1123F ACP DISCUSS/DSCN MKR DOCD: CPT | Performed by: INTERNAL MEDICINE

## 2018-10-30 PROCEDURE — 1090F PRES/ABSN URINE INCON ASSESS: CPT | Performed by: INTERNAL MEDICINE

## 2018-10-30 PROCEDURE — G8484 FLU IMMUNIZE NO ADMIN: HCPCS | Performed by: INTERNAL MEDICINE

## 2018-10-30 PROCEDURE — G8428 CUR MEDS NOT DOCUMENT: HCPCS | Performed by: INTERNAL MEDICINE

## 2018-10-30 PROCEDURE — G8400 PT W/DXA NO RESULTS DOC: HCPCS | Performed by: INTERNAL MEDICINE

## 2018-10-30 PROCEDURE — 1101F PT FALLS ASSESS-DOCD LE1/YR: CPT | Performed by: INTERNAL MEDICINE

## 2018-10-30 RX ORDER — OMEPRAZOLE 40 MG/1
40 CAPSULE, DELAYED RELEASE ORAL DAILY
Qty: 90 CAPSULE | Refills: 1 | Status: SHIPPED | OUTPATIENT
Start: 2018-10-30 | End: 2019-05-04 | Stop reason: SDUPTHER

## 2018-10-30 RX ORDER — LEVOTHYROXINE SODIUM 25 MCG
TABLET ORAL
Qty: 30 TABLET | Refills: 0 | OUTPATIENT
Start: 2018-10-30

## 2018-10-31 DIAGNOSIS — E03.4 HYPOTHYROIDISM DUE TO ACQUIRED ATROPHY OF THYROID: ICD-10-CM

## 2018-10-31 LAB
ESTIMATED AVERAGE GLUCOSE: 317.8 MG/DL
HBA1C MFR BLD: 12.7 %

## 2018-10-31 RX ORDER — LEVOTHYROXINE SODIUM 0.05 MG/1
50 TABLET ORAL DAILY
Qty: 90 TABLET | Refills: 0 | Status: SHIPPED | OUTPATIENT
Start: 2018-10-31 | End: 2019-01-10 | Stop reason: CLARIF

## 2018-10-31 RX ORDER — LEVOTHYROXINE SODIUM 75 MCG
75 TABLET ORAL DAILY
Qty: 90 TABLET | Refills: 0 | Status: SHIPPED | OUTPATIENT
Start: 2018-10-31 | End: 2018-10-31

## 2018-11-05 RX ORDER — PRAVASTATIN SODIUM 80 MG/1
TABLET ORAL
Qty: 90 TABLET | Refills: 0 | Status: SHIPPED | OUTPATIENT
Start: 2018-11-05 | End: 2019-02-19 | Stop reason: SDUPTHER

## 2018-11-26 RX ORDER — TRAZODONE HYDROCHLORIDE 50 MG/1
TABLET ORAL
Qty: 90 TABLET | Refills: 0 | Status: SHIPPED | OUTPATIENT
Start: 2018-11-26 | End: 2019-02-22 | Stop reason: CLARIF

## 2018-11-26 RX ORDER — LAMOTRIGINE 25 MG/1
25 TABLET ORAL DAILY
Qty: 90 TABLET | Refills: 0 | Status: SHIPPED | OUTPATIENT
Start: 2018-11-26 | End: 2019-03-01 | Stop reason: SDUPTHER

## 2018-12-07 ENCOUNTER — CARE COORDINATION (OUTPATIENT)
Dept: CARE COORDINATION | Age: 78
End: 2018-12-07

## 2018-12-07 NOTE — CARE COORDINATION
Ambulatory Care Coordination Note- updated CC Plan of Care  12/7/2018  CM Risk Score: 1  Silas Mortality Risk Score: 4.73    ACC: Mookie Sorensen RN    Summary Note: discussed progress since allowing [facility] to assist w/mgmt DM. (DM assessment below) Review of A1c. Encouraged pt anticipation in potential improvement to A1c at next recheck, since pt has allowed [facility] to manage medications, including insulin adm. Pt self identifies she has been having a high carbohydrate diet and plans to start low carb diet after new year. Encouraged pt in following a carb-controlled diet now. Discussed methods/ways pt can enjoy a low carb diet - even through holidays. Pt agrees to try suggestions. States she has been eating a lot of pasta d/t dentition problems. She expects dental problems will be resolved some time soon in the new year (Jan/Feb). This will allow pt to eat more salads and refrain from pasta dishes, which are presently easier for her to chew. Discussed egs to try: minestrone soup, chicken soup, cottage cheese, etc  Pt agrees to try more food selections as discussed  Pt states she \"will run out of money in 2019, 2020\" and is in consideration of downsizing personal effects and cherished belongings. She is also concerned about living @ [facility] and how her new accomodations may be (ie services included/excluded) once she \"runs out of funds. \"  Denies depression, thoughts of self harm or SI. Agrees to further discussion w/Pema Wen, her therapist @ [facility] about her concerns of change of lifestyle r/t anticipated financial barriers in coming year. Plan: pt will begin low carb diet; make outreach to [facility] for support as needed. Pt will consider willingness/desire for added support of RD   Pt will discuss w/Ms Ross Wen about fears re downsizing & having significant less income in coming years.      Diabetes Assessment    Meal Planning:  Avoidance of concentrated sweets, Carb counting, Plate Method   How often do you test your blood condition(s):  Pt demonstrates adherence to medications  Pt demonstrates understanding of self-monitoring  Patient is able to identify Red Flags:  Alert to potential adverse drug reactions(s) or side effects and actions to take should they arise  Discuss target symptoms and actions to take should they arise  Identify problems that require immediate PCP or specialist visit  Patient demonstrates understanding of access to PCP/Specialist:  Understands about scheduling routine Follow Up appointments   Understands about sick day appointment options for worsening of symptoms/progression (Same Day, E Visits)       Nutrition Plan (pt-stated)   No change (12/7/2018)             I will follow a carb-controlled diet    Barriers: fear of failure, lack of motivation, physical: dental problems and menu options @ [facility]  Plan for overcoming my barriers: eat low starchy carbohydrates/informed decisions @ [facility] dining; dental care; outreach to [address] as needed; consider added support of RD  Confidence: no numeric value assigned/10  Anticipated Goal Completion Date: 4/30/19       COMPLETED: Reduce Falls  (pt-stated)   On track (12/7/2018)             I will reduce my risk of falls by the following: precautions w/transitions    Barriers: fear of failure  Plan for overcoming my barriers: extended pauses between transitions; use of DME as appropriate  Confidence: 10/10  Anticipated Goal Completion Date: 9/4/18; renew goal date 12/31/18 9/7/18 d/t hx falls this year - extended goal date            Prior to Admission medications    Medication Sig Start Date End Date Taking?  Authorizing Provider   traZODone (DESYREL) 50 MG tablet TAKE 1 TABLET BY MOUTH DAILY 11/26/18   Yusra Perez MD   lamoTRIgine (LAMICTAL) 25 MG tablet TAKE 1 TABLET BY MOUTH DAILY 11/26/18   Yusra Perez MD   pravastatin (PRAVACHOL) 80 MG tablet TAKE 1 TABLET BY MOUTH DAILY 11/5/18   ANGEL Romo - CNP   levothyroxine (SYNTHROID) 50 MCG tablet

## 2018-12-18 ENCOUNTER — TELEPHONE (OUTPATIENT)
Dept: INTERNAL MEDICINE CLINIC | Age: 78
End: 2018-12-18

## 2018-12-18 RX ORDER — LANCETS 30 GAUGE
EACH MISCELLANEOUS
Qty: 100 EACH | Refills: 3 | Status: SHIPPED | OUTPATIENT
Start: 2018-12-18 | End: 2019-02-22 | Stop reason: CLARIF

## 2018-12-24 RX ORDER — LISINOPRIL 20 MG/1
TABLET ORAL
Qty: 90 TABLET | Refills: 0 | Status: SHIPPED | OUTPATIENT
Start: 2018-12-24 | End: 2019-04-02 | Stop reason: SDUPTHER

## 2018-12-26 LAB
ALBUMIN SERPL-MCNC: 3.7 G/DL
ALP BLD-CCNC: 75 U/L
ALT SERPL-CCNC: 14 U/L
ANION GAP SERPL CALCULATED.3IONS-SCNC: NORMAL MMOL/L
AST SERPL-CCNC: 14 U/L
BASOPHILS ABSOLUTE: ABNORMAL /ΜL
BASOPHILS RELATIVE PERCENT: 0.9 %
BILIRUB SERPL-MCNC: 0.4 MG/DL (ref 0.1–1.4)
BUN BLDV-MCNC: 23 MG/DL
CALCIUM SERPL-MCNC: 8.9 MG/DL
CHLORIDE BLD-SCNC: 102 MMOL/L
CO2: 23 MMOL/L
CREAT SERPL-MCNC: 1.2 MG/DL
EOSINOPHILS ABSOLUTE: ABNORMAL /ΜL
EOSINOPHILS RELATIVE PERCENT: 4.9 %
GFR CALCULATED: NORMAL
GLUCOSE BLD-MCNC: 226 MG/DL
HCT VFR BLD CALC: 36.2 % (ref 36–46)
HEMOGLOBIN: 11.9 G/DL (ref 12–16)
LYMPHOCYTES ABSOLUTE: 1.8 /ΜL
LYMPHOCYTES RELATIVE PERCENT: 326 %
MCH RBC QN AUTO: 32 PG
MCHC RBC AUTO-ENTMCNC: 32.9 G/DL
MCV RBC AUTO: 97.1 FL
MONOCYTES ABSOLUTE: ABNORMAL /ΜL
MONOCYTES RELATIVE PERCENT: 9.9 %
NEUTROPHILS ABSOLUTE: 2.9 /ΜL
NEUTROPHILS RELATIVE PERCENT: 51.7 %
PDW BLD-RTO: 13.6 %
PLATELET # BLD: 341 K/ΜL
PMV BLD AUTO: 8.7 FL
POTASSIUM SERPL-SCNC: 4.9 MMOL/L
RBC # BLD: 3.73 10^6/ΜL
SODIUM BLD-SCNC: 135 MMOL/L
TOTAL PROTEIN: 6.3
TSH SERPL DL<=0.05 MIU/L-ACNC: NORMAL UIU/ML
WBC # BLD: 5.6 10^3/ML

## 2019-01-08 ENCOUNTER — CARE COORDINATION (OUTPATIENT)
Dept: CARE COORDINATION | Age: 79
End: 2019-01-08

## 2019-01-08 RX ORDER — MIRTAZAPINE 15 MG/1
TABLET, FILM COATED ORAL
Qty: 45 TABLET | Refills: 0 | Status: SHIPPED | OUTPATIENT
Start: 2019-01-08 | End: 2019-04-16 | Stop reason: SDUPTHER

## 2019-01-10 ENCOUNTER — TELEPHONE (OUTPATIENT)
Dept: INTERNAL MEDICINE CLINIC | Age: 79
End: 2019-01-10

## 2019-01-10 DIAGNOSIS — E03.9 HYPOTHYROIDISM, UNSPECIFIED TYPE: Primary | ICD-10-CM

## 2019-01-10 RX ORDER — LEVOTHYROXINE SODIUM 0.07 MG/1
75 TABLET ORAL DAILY
Qty: 30 TABLET | Refills: 1 | Status: SHIPPED | OUTPATIENT
Start: 2019-01-10 | End: 2019-03-15 | Stop reason: SDUPTHER

## 2019-01-10 RX ORDER — LEVOTHYROXINE SODIUM 0.07 MG/1
TABLET ORAL
Qty: 30 TABLET | Refills: 1 | OUTPATIENT
Start: 2019-01-10

## 2019-01-28 ENCOUNTER — HOSPITAL ENCOUNTER (INPATIENT)
Age: 79
LOS: 4 days | Discharge: HOME | DRG: 639 | End: 2019-02-01
Attending: EMERGENCY MEDICINE
Payer: MEDICARE

## 2019-01-28 ENCOUNTER — APPOINTMENT (OUTPATIENT)
Dept: GENERAL RADIOLOGY | Age: 79
DRG: 639 | End: 2019-01-28
Payer: MEDICARE

## 2019-01-28 DIAGNOSIS — E11.10 DIABETIC KETOACIDOSIS WITHOUT COMA ASSOCIATED WITH TYPE 2 DIABETES MELLITUS (HCC): Primary | ICD-10-CM

## 2019-01-28 DIAGNOSIS — R07.9 CHEST PAIN, UNSPECIFIED TYPE: ICD-10-CM

## 2019-01-28 LAB
ANION GAP SERPL CALCULATED.3IONS-SCNC: 20 MMOL/L (ref 3–16)
ANION GAP SERPL CALCULATED.3IONS-SCNC: 20 MMOL/L (ref 3–16)
BASE EXCESS VENOUS: -6 (ref -3–3)
BASOPHILS ABSOLUTE: 0 K/UL (ref 0–0.2)
BASOPHILS RELATIVE PERCENT: 0.4 %
BILIRUBIN URINE: NEGATIVE
BLOOD, URINE: NEGATIVE
BUN BLDV-MCNC: 25 MG/DL (ref 7–20)
BUN BLDV-MCNC: 25 MG/DL (ref 7–20)
CALCIUM SERPL-MCNC: 8.8 MG/DL (ref 8.3–10.6)
CALCIUM SERPL-MCNC: 8.9 MG/DL (ref 8.3–10.6)
CHLORIDE BLD-SCNC: 91 MMOL/L (ref 99–110)
CHLORIDE BLD-SCNC: 91 MMOL/L (ref 99–110)
CLARITY: CLEAR
CO2: 17 MMOL/L (ref 21–32)
CO2: 19 MMOL/L (ref 21–32)
COLOR: YELLOW
CREAT SERPL-MCNC: 1.2 MG/DL (ref 0.6–1.2)
CREAT SERPL-MCNC: 1.3 MG/DL (ref 0.6–1.2)
EOSINOPHILS ABSOLUTE: 0 K/UL (ref 0–0.6)
EOSINOPHILS RELATIVE PERCENT: 0.7 %
GFR AFRICAN AMERICAN: 48
GFR AFRICAN AMERICAN: 52
GFR NON-AFRICAN AMERICAN: 40
GFR NON-AFRICAN AMERICAN: 43
GLUCOSE BLD-MCNC: 529 MG/DL (ref 70–99)
GLUCOSE BLD-MCNC: 558 MG/DL (ref 70–99)
GLUCOSE BLD-MCNC: 748 MG/DL (ref 70–99)
GLUCOSE BLD-MCNC: 781 MG/DL (ref 70–99)
GLUCOSE BLD-MCNC: >600 MG/DL (ref 70–99)
GLUCOSE URINE: >=1000 MG/DL
HCO3 VENOUS: 20.5 MMOL/L (ref 23–29)
HCT VFR BLD CALC: 37.2 % (ref 36–48)
HEMOGLOBIN: 11.9 G/DL (ref 12–16)
KETONES, URINE: 15 MG/DL
LACTATE: 2.09 MMOL/L (ref 0.4–2)
LEUKOCYTE ESTERASE, URINE: NEGATIVE
LYMPHOCYTES ABSOLUTE: 1.2 K/UL (ref 1–5.1)
LYMPHOCYTES RELATIVE PERCENT: 27.5 %
MCH RBC QN AUTO: 30.6 PG (ref 26–34)
MCHC RBC AUTO-ENTMCNC: 31.9 G/DL (ref 31–36)
MCV RBC AUTO: 95.9 FL (ref 80–100)
MICROSCOPIC EXAMINATION: ABNORMAL
MONOCYTES ABSOLUTE: 0.1 K/UL (ref 0–1.3)
MONOCYTES RELATIVE PERCENT: 3.2 %
NEUTROPHILS ABSOLUTE: 3 K/UL (ref 1.7–7.7)
NEUTROPHILS RELATIVE PERCENT: 68.2 %
NITRITE, URINE: NEGATIVE
O2 SAT, VEN: 61 %
PCO2, VEN: 40.7 MM HG (ref 40–50)
PDW BLD-RTO: 13.6 % (ref 12.4–15.4)
PERFORMED ON: ABNORMAL
PH UA: 6.5
PH VENOUS: 7.31 (ref 7.35–7.45)
PLATELET # BLD: 233 K/UL (ref 135–450)
PMV BLD AUTO: 8.7 FL (ref 5–10.5)
PO2, VEN: 35 MM HG
POC SAMPLE TYPE: ABNORMAL
POTASSIUM REFLEX MAGNESIUM: 5.2 MMOL/L (ref 3.5–5.1)
POTASSIUM SERPL-SCNC: 5.1 MMOL/L (ref 3.5–5.1)
PRO-BNP: 726 PG/ML (ref 0–449)
PROTEIN UA: NEGATIVE MG/DL
RBC # BLD: 3.88 M/UL (ref 4–5.2)
SODIUM BLD-SCNC: 128 MMOL/L (ref 136–145)
SODIUM BLD-SCNC: 130 MMOL/L (ref 136–145)
SPECIFIC GRAVITY UA: <=1.005
TCO2 CALC VENOUS: 22 MMOL/L
TROPONIN: <0.01 NG/ML
TROPONIN: <0.01 NG/ML
URINE REFLEX TO CULTURE: ABNORMAL
URINE TYPE: ABNORMAL
UROBILINOGEN, URINE: 0.2 E.U./DL
WBC # BLD: 4.4 K/UL (ref 4–11)

## 2019-01-28 PROCEDURE — 71045 X-RAY EXAM CHEST 1 VIEW: CPT

## 2019-01-28 PROCEDURE — 6370000000 HC RX 637 (ALT 250 FOR IP): Performed by: EMERGENCY MEDICINE

## 2019-01-28 PROCEDURE — 99291 CRITICAL CARE FIRST HOUR: CPT

## 2019-01-28 PROCEDURE — 36415 COLL VENOUS BLD VENIPUNCTURE: CPT

## 2019-01-28 PROCEDURE — 83880 ASSAY OF NATRIURETIC PEPTIDE: CPT

## 2019-01-28 PROCEDURE — 93005 ELECTROCARDIOGRAM TRACING: CPT | Performed by: PHYSICIAN ASSISTANT

## 2019-01-28 PROCEDURE — 84484 ASSAY OF TROPONIN QUANT: CPT

## 2019-01-28 PROCEDURE — 81003 URINALYSIS AUTO W/O SCOPE: CPT

## 2019-01-28 PROCEDURE — 83605 ASSAY OF LACTIC ACID: CPT

## 2019-01-28 PROCEDURE — 85025 COMPLETE CBC W/AUTO DIFF WBC: CPT

## 2019-01-28 PROCEDURE — 1200000000 HC SEMI PRIVATE

## 2019-01-28 PROCEDURE — 83036 HEMOGLOBIN GLYCOSYLATED A1C: CPT

## 2019-01-28 PROCEDURE — 96374 THER/PROPH/DIAG INJ IV PUSH: CPT

## 2019-01-28 PROCEDURE — 96361 HYDRATE IV INFUSION ADD-ON: CPT

## 2019-01-28 PROCEDURE — 80048 BASIC METABOLIC PNL TOTAL CA: CPT

## 2019-01-28 PROCEDURE — 82803 BLOOD GASES ANY COMBINATION: CPT

## 2019-01-28 PROCEDURE — 2580000003 HC RX 258: Performed by: PHYSICIAN ASSISTANT

## 2019-01-28 PROCEDURE — 2580000003 HC RX 258: Performed by: EMERGENCY MEDICINE

## 2019-01-28 PROCEDURE — 99223 1ST HOSP IP/OBS HIGH 75: CPT | Performed by: INTERNAL MEDICINE

## 2019-01-28 RX ORDER — 0.9 % SODIUM CHLORIDE 0.9 %
500 INTRAVENOUS SOLUTION INTRAVENOUS ONCE
Status: COMPLETED | OUTPATIENT
Start: 2019-01-28 | End: 2019-01-28

## 2019-01-28 RX ORDER — DEXTROSE MONOHYDRATE 25 G/50ML
12.5 INJECTION, SOLUTION INTRAVENOUS PRN
Status: DISCONTINUED | OUTPATIENT
Start: 2019-01-28 | End: 2019-02-01 | Stop reason: HOSPADM

## 2019-01-28 RX ORDER — NICOTINE POLACRILEX 4 MG
15 LOZENGE BUCCAL PRN
Status: DISCONTINUED | OUTPATIENT
Start: 2019-01-28 | End: 2019-02-01 | Stop reason: HOSPADM

## 2019-01-28 RX ORDER — 0.9 % SODIUM CHLORIDE 0.9 %
1000 INTRAVENOUS SOLUTION INTRAVENOUS ONCE
Status: COMPLETED | OUTPATIENT
Start: 2019-01-28 | End: 2019-01-29

## 2019-01-28 RX ORDER — DEXTROSE MONOHYDRATE 50 MG/ML
100 INJECTION, SOLUTION INTRAVENOUS PRN
Status: DISCONTINUED | OUTPATIENT
Start: 2019-01-28 | End: 2019-02-01 | Stop reason: HOSPADM

## 2019-01-28 RX ADMIN — SODIUM CHLORIDE 500 ML: 9 INJECTION, SOLUTION INTRAVENOUS at 20:22

## 2019-01-28 RX ADMIN — INSULIN HUMAN 10 UNITS: 100 INJECTION, SOLUTION PARENTERAL at 20:22

## 2019-01-28 RX ADMIN — SODIUM CHLORIDE 1000 ML: 9 INJECTION, SOLUTION INTRAVENOUS at 23:14

## 2019-01-28 ASSESSMENT — ENCOUNTER SYMPTOMS
BACK PAIN: 0
NAUSEA: 0
SHORTNESS OF BREATH: 1
PHOTOPHOBIA: 0
DIARRHEA: 0
ABDOMINAL PAIN: 0
EYE PAIN: 0
COUGH: 0
VOMITING: 0

## 2019-01-28 ASSESSMENT — PAIN DESCRIPTION - PAIN TYPE: TYPE: ACUTE PAIN

## 2019-01-28 ASSESSMENT — PAIN SCALES - GENERAL: PAINLEVEL_OUTOF10: 2

## 2019-01-28 ASSESSMENT — HEART SCORE
ECG: 0
ECG: 0

## 2019-01-28 ASSESSMENT — PAIN DESCRIPTION - LOCATION: LOCATION: CHEST

## 2019-01-29 LAB
A/G RATIO: 1.3 (ref 1.1–2.2)
ALBUMIN SERPL-MCNC: 3.8 G/DL (ref 3.4–5)
ALP BLD-CCNC: 111 U/L (ref 40–129)
ALT SERPL-CCNC: 15 U/L (ref 10–40)
ANION GAP SERPL CALCULATED.3IONS-SCNC: 13 MMOL/L (ref 3–16)
ANION GAP SERPL CALCULATED.3IONS-SCNC: 14 MMOL/L (ref 3–16)
ANION GAP SERPL CALCULATED.3IONS-SCNC: 14 MMOL/L (ref 3–16)
ANION GAP SERPL CALCULATED.3IONS-SCNC: 15 MMOL/L (ref 3–16)
AST SERPL-CCNC: 25 U/L (ref 15–37)
BASOPHILS ABSOLUTE: 0 K/UL (ref 0–0.2)
BASOPHILS RELATIVE PERCENT: 0.8 %
BETA-HYDROXYBUTYRATE: 0.16 MMOL/L (ref 0–0.27)
BILIRUB SERPL-MCNC: 0.4 MG/DL (ref 0–1)
BUN BLDV-MCNC: 19 MG/DL (ref 7–20)
BUN BLDV-MCNC: 21 MG/DL (ref 7–20)
BUN BLDV-MCNC: 21 MG/DL (ref 7–20)
BUN BLDV-MCNC: 23 MG/DL (ref 7–20)
CALCIUM SERPL-MCNC: 8.8 MG/DL (ref 8.3–10.6)
CALCIUM SERPL-MCNC: 8.9 MG/DL (ref 8.3–10.6)
CALCIUM SERPL-MCNC: 9.1 MG/DL (ref 8.3–10.6)
CALCIUM SERPL-MCNC: 9.3 MG/DL (ref 8.3–10.6)
CHLORIDE BLD-SCNC: 103 MMOL/L (ref 99–110)
CHLORIDE BLD-SCNC: 103 MMOL/L (ref 99–110)
CHLORIDE BLD-SCNC: 104 MMOL/L (ref 99–110)
CHLORIDE BLD-SCNC: 108 MMOL/L (ref 99–110)
CO2: 20 MMOL/L (ref 21–32)
CO2: 21 MMOL/L (ref 21–32)
CO2: 22 MMOL/L (ref 21–32)
CO2: 22 MMOL/L (ref 21–32)
CREAT SERPL-MCNC: 1 MG/DL (ref 0.6–1.2)
CREAT SERPL-MCNC: 1.1 MG/DL (ref 0.6–1.2)
CREAT SERPL-MCNC: 1.1 MG/DL (ref 0.6–1.2)
CREAT SERPL-MCNC: 1.4 MG/DL (ref 0.6–1.2)
EKG ATRIAL RATE: 80 BPM
EKG DIAGNOSIS: NORMAL
EKG P AXIS: 49 DEGREES
EKG P-R INTERVAL: 186 MS
EKG Q-T INTERVAL: 398 MS
EKG QRS DURATION: 112 MS
EKG QTC CALCULATION (BAZETT): 459 MS
EKG R AXIS: -13 DEGREES
EKG T AXIS: 16 DEGREES
EKG VENTRICULAR RATE: 80 BPM
EOSINOPHILS ABSOLUTE: 0.1 K/UL (ref 0–0.6)
EOSINOPHILS RELATIVE PERCENT: 2.5 %
GFR AFRICAN AMERICAN: 44
GFR AFRICAN AMERICAN: 58
GFR AFRICAN AMERICAN: 58
GFR AFRICAN AMERICAN: >60
GFR NON-AFRICAN AMERICAN: 36
GFR NON-AFRICAN AMERICAN: 48
GFR NON-AFRICAN AMERICAN: 48
GFR NON-AFRICAN AMERICAN: 54
GLOBULIN: 3 G/DL
GLUCOSE BLD-MCNC: 102 MG/DL (ref 70–99)
GLUCOSE BLD-MCNC: 112 MG/DL (ref 70–99)
GLUCOSE BLD-MCNC: 136 MG/DL (ref 70–99)
GLUCOSE BLD-MCNC: 138 MG/DL (ref 70–99)
GLUCOSE BLD-MCNC: 147 MG/DL (ref 70–99)
GLUCOSE BLD-MCNC: 150 MG/DL (ref 70–99)
GLUCOSE BLD-MCNC: 150 MG/DL (ref 70–99)
GLUCOSE BLD-MCNC: 151 MG/DL (ref 70–99)
GLUCOSE BLD-MCNC: 152 MG/DL (ref 70–99)
GLUCOSE BLD-MCNC: 155 MG/DL (ref 70–99)
GLUCOSE BLD-MCNC: 168 MG/DL (ref 70–99)
GLUCOSE BLD-MCNC: 183 MG/DL (ref 70–99)
GLUCOSE BLD-MCNC: 195 MG/DL (ref 70–99)
GLUCOSE BLD-MCNC: 200 MG/DL (ref 70–99)
GLUCOSE BLD-MCNC: 218 MG/DL (ref 70–99)
GLUCOSE BLD-MCNC: 256 MG/DL (ref 70–99)
GLUCOSE BLD-MCNC: 261 MG/DL (ref 70–99)
GLUCOSE BLD-MCNC: 370 MG/DL (ref 70–99)
GLUCOSE BLD-MCNC: 426 MG/DL (ref 70–99)
GLUCOSE BLD-MCNC: 522 MG/DL (ref 70–99)
GLUCOSE BLD-MCNC: 82 MG/DL (ref 70–99)
HCT VFR BLD CALC: 37.2 % (ref 36–48)
HEMOGLOBIN: 12.3 G/DL (ref 12–16)
LV EF: 60 %
LVEF MODALITY: NORMAL
LYMPHOCYTES ABSOLUTE: 2.3 K/UL (ref 1–5.1)
LYMPHOCYTES RELATIVE PERCENT: 38.1 %
MAGNESIUM: 2 MG/DL (ref 1.8–2.4)
MAGNESIUM: 2.1 MG/DL (ref 1.8–2.4)
MCH RBC QN AUTO: 30.7 PG (ref 26–34)
MCHC RBC AUTO-ENTMCNC: 33 G/DL (ref 31–36)
MCV RBC AUTO: 92.9 FL (ref 80–100)
MONOCYTES ABSOLUTE: 0.7 K/UL (ref 0–1.3)
MONOCYTES RELATIVE PERCENT: 12.6 %
NEUTROPHILS ABSOLUTE: 2.7 K/UL (ref 1.7–7.7)
NEUTROPHILS RELATIVE PERCENT: 46 %
OSMOLALITY: 295 MOSM/KG (ref 278–305)
PDW BLD-RTO: 13.8 % (ref 12.4–15.4)
PERFORMED ON: ABNORMAL
PERFORMED ON: NORMAL
PHOSPHORUS: 2.7 MG/DL (ref 2.5–4.9)
PHOSPHORUS: 3.4 MG/DL (ref 2.5–4.9)
PLATELET # BLD: 246 K/UL (ref 135–450)
PMV BLD AUTO: 8.1 FL (ref 5–10.5)
POTASSIUM SERPL-SCNC: 4.1 MMOL/L (ref 3.5–5.1)
POTASSIUM SERPL-SCNC: 4.2 MMOL/L (ref 3.5–5.1)
POTASSIUM SERPL-SCNC: 4.4 MMOL/L (ref 3.5–5.1)
POTASSIUM SERPL-SCNC: 4.6 MMOL/L (ref 3.5–5.1)
RBC # BLD: 4 M/UL (ref 4–5.2)
SODIUM BLD-SCNC: 139 MMOL/L (ref 136–145)
SODIUM BLD-SCNC: 139 MMOL/L (ref 136–145)
SODIUM BLD-SCNC: 140 MMOL/L (ref 136–145)
SODIUM BLD-SCNC: 141 MMOL/L (ref 136–145)
TOTAL PROTEIN: 6.8 G/DL (ref 6.4–8.2)
TROPONIN: <0.01 NG/ML
WBC # BLD: 5.9 K/UL (ref 4–11)

## 2019-01-29 PROCEDURE — 1200000000 HC SEMI PRIVATE

## 2019-01-29 PROCEDURE — 84100 ASSAY OF PHOSPHORUS: CPT

## 2019-01-29 PROCEDURE — 6370000000 HC RX 637 (ALT 250 FOR IP): Performed by: STUDENT IN AN ORGANIZED HEALTH CARE EDUCATION/TRAINING PROGRAM

## 2019-01-29 PROCEDURE — 82010 KETONE BODYS QUAN: CPT

## 2019-01-29 PROCEDURE — 2580000003 HC RX 258: Performed by: PHYSICIAN ASSISTANT

## 2019-01-29 PROCEDURE — 83735 ASSAY OF MAGNESIUM: CPT

## 2019-01-29 PROCEDURE — 36415 COLL VENOUS BLD VENIPUNCTURE: CPT

## 2019-01-29 PROCEDURE — 82306 VITAMIN D 25 HYDROXY: CPT

## 2019-01-29 PROCEDURE — 6370000000 HC RX 637 (ALT 250 FOR IP): Performed by: INTERNAL MEDICINE

## 2019-01-29 PROCEDURE — 2500000003 HC RX 250 WO HCPCS: Performed by: STUDENT IN AN ORGANIZED HEALTH CARE EDUCATION/TRAINING PROGRAM

## 2019-01-29 PROCEDURE — 80053 COMPREHEN METABOLIC PANEL: CPT

## 2019-01-29 PROCEDURE — 83930 ASSAY OF BLOOD OSMOLALITY: CPT

## 2019-01-29 PROCEDURE — 85025 COMPLETE CBC W/AUTO DIFF WBC: CPT

## 2019-01-29 PROCEDURE — 6370000000 HC RX 637 (ALT 250 FOR IP): Performed by: PHYSICIAN ASSISTANT

## 2019-01-29 PROCEDURE — 2580000003 HC RX 258: Performed by: STUDENT IN AN ORGANIZED HEALTH CARE EDUCATION/TRAINING PROGRAM

## 2019-01-29 PROCEDURE — 84484 ASSAY OF TROPONIN QUANT: CPT

## 2019-01-29 PROCEDURE — 6360000002 HC RX W HCPCS: Performed by: STUDENT IN AN ORGANIZED HEALTH CARE EDUCATION/TRAINING PROGRAM

## 2019-01-29 RX ORDER — LEVOTHYROXINE SODIUM 0.07 MG/1
75 TABLET ORAL
Status: DISCONTINUED | OUTPATIENT
Start: 2019-01-29 | End: 2019-02-01 | Stop reason: HOSPADM

## 2019-01-29 RX ORDER — SODIUM CHLORIDE 450 MG/100ML
INJECTION, SOLUTION INTRAVENOUS CONTINUOUS
Status: DISCONTINUED | OUTPATIENT
Start: 2019-01-29 | End: 2019-01-29

## 2019-01-29 RX ORDER — PRAVASTATIN SODIUM 80 MG/1
80 TABLET ORAL DAILY
Status: DISCONTINUED | OUTPATIENT
Start: 2019-01-29 | End: 2019-02-01 | Stop reason: HOSPADM

## 2019-01-29 RX ORDER — POTASSIUM CHLORIDE 7.45 MG/ML
10 INJECTION INTRAVENOUS PRN
Status: DISCONTINUED | OUTPATIENT
Start: 2019-01-29 | End: 2019-01-29

## 2019-01-29 RX ORDER — CITALOPRAM 10 MG/1
10 TABLET ORAL DAILY
Status: DISCONTINUED | OUTPATIENT
Start: 2019-01-29 | End: 2019-02-01 | Stop reason: HOSPADM

## 2019-01-29 RX ORDER — TRAZODONE HYDROCHLORIDE 50 MG/1
50 TABLET ORAL DAILY
Status: DISCONTINUED | OUTPATIENT
Start: 2019-01-29 | End: 2019-02-01 | Stop reason: HOSPADM

## 2019-01-29 RX ORDER — PANTOPRAZOLE SODIUM 40 MG/1
40 TABLET, DELAYED RELEASE ORAL
Status: DISCONTINUED | OUTPATIENT
Start: 2019-01-29 | End: 2019-02-01 | Stop reason: HOSPADM

## 2019-01-29 RX ORDER — DEXTROSE, SODIUM CHLORIDE, AND POTASSIUM CHLORIDE 5; .45; .15 G/100ML; G/100ML; G/100ML
INJECTION INTRAVENOUS CONTINUOUS PRN
Status: DISCONTINUED | OUTPATIENT
Start: 2019-01-29 | End: 2019-01-29

## 2019-01-29 RX ORDER — DEXTROSE MONOHYDRATE 25 G/50ML
12.5 INJECTION, SOLUTION INTRAVENOUS PRN
Status: DISCONTINUED | OUTPATIENT
Start: 2019-01-29 | End: 2019-01-29 | Stop reason: SDUPTHER

## 2019-01-29 RX ORDER — MAGNESIUM SULFATE 1 G/100ML
1 INJECTION INTRAVENOUS PRN
Status: DISCONTINUED | OUTPATIENT
Start: 2019-01-29 | End: 2019-01-29

## 2019-01-29 RX ORDER — LAMOTRIGINE 25 MG/1
25 TABLET ORAL DAILY
Status: DISCONTINUED | OUTPATIENT
Start: 2019-01-29 | End: 2019-02-01 | Stop reason: HOSPADM

## 2019-01-29 RX ORDER — MIRTAZAPINE 15 MG/1
7.5 TABLET, FILM COATED ORAL NIGHTLY
Status: DISCONTINUED | OUTPATIENT
Start: 2019-01-29 | End: 2019-02-01 | Stop reason: HOSPADM

## 2019-01-29 RX ORDER — LISINOPRIL 20 MG/1
20 TABLET ORAL DAILY
Status: DISCONTINUED | OUTPATIENT
Start: 2019-01-29 | End: 2019-02-01 | Stop reason: HOSPADM

## 2019-01-29 RX ORDER — HEPARIN SODIUM 5000 [USP'U]/ML
5000 INJECTION, SOLUTION INTRAVENOUS; SUBCUTANEOUS EVERY 8 HOURS SCHEDULED
Status: DISCONTINUED | OUTPATIENT
Start: 2019-01-29 | End: 2019-02-01 | Stop reason: HOSPADM

## 2019-01-29 RX ADMIN — PANTOPRAZOLE SODIUM 40 MG: 40 TABLET, DELAYED RELEASE ORAL at 07:57

## 2019-01-29 RX ADMIN — INSULIN LISPRO 3 UNITS: 100 INJECTION, SOLUTION INTRAVENOUS; SUBCUTANEOUS at 19:00

## 2019-01-29 RX ADMIN — MIRTAZAPINE 7.5 MG: 15 TABLET, FILM COATED ORAL at 21:31

## 2019-01-29 RX ADMIN — TRAZODONE HYDROCHLORIDE 50 MG: 50 TABLET ORAL at 08:01

## 2019-01-29 RX ADMIN — LISINOPRIL 20 MG: 20 TABLET ORAL at 13:01

## 2019-01-29 RX ADMIN — HEPARIN SODIUM 5000 UNITS: 5000 INJECTION INTRAVENOUS; SUBCUTANEOUS at 07:52

## 2019-01-29 RX ADMIN — LEVOTHYROXINE SODIUM 75 MCG: 75 TABLET ORAL at 07:53

## 2019-01-29 RX ADMIN — INSULIN GLARGINE 34 UNITS: 100 INJECTION, SOLUTION SUBCUTANEOUS at 08:09

## 2019-01-29 RX ADMIN — LAMOTRIGINE 25 MG: 25 TABLET ORAL at 08:01

## 2019-01-29 RX ADMIN — PRAVASTATIN SODIUM 80 MG: 80 TABLET ORAL at 08:04

## 2019-01-29 RX ADMIN — POTASSIUM CHLORIDE, DEXTROSE MONOHYDRATE AND SODIUM CHLORIDE 150 ML/HR: 150; 5; 450 INJECTION, SOLUTION INTRAVENOUS at 03:16

## 2019-01-29 RX ADMIN — HEPARIN SODIUM 5000 UNITS: 5000 INJECTION INTRAVENOUS; SUBCUTANEOUS at 21:30

## 2019-01-29 RX ADMIN — INSULIN LISPRO 3 UNITS: 100 INJECTION, SOLUTION INTRAVENOUS; SUBCUTANEOUS at 12:59

## 2019-01-29 RX ADMIN — INSULIN HUMAN 10 UNITS: 100 INJECTION, SOLUTION PARENTERAL at 11:07

## 2019-01-29 RX ADMIN — INSULIN GLARGINE 45 UNITS: 100 INJECTION, SOLUTION SUBCUTANEOUS at 21:19

## 2019-01-29 RX ADMIN — SODIUM CHLORIDE 8.9 UNITS/HR: 9 INJECTION, SOLUTION INTRAVENOUS at 02:25

## 2019-01-29 RX ADMIN — SODIUM CHLORIDE 12.39 UNITS/HR: 9 INJECTION, SOLUTION INTRAVENOUS at 00:05

## 2019-01-29 RX ADMIN — INSULIN LISPRO 7 UNITS: 100 INJECTION, SOLUTION INTRAVENOUS; SUBCUTANEOUS at 18:59

## 2019-01-29 RX ADMIN — HEPARIN SODIUM 5000 UNITS: 5000 INJECTION INTRAVENOUS; SUBCUTANEOUS at 13:42

## 2019-01-29 RX ADMIN — INSULIN LISPRO 7 UNITS: 100 INJECTION, SOLUTION INTRAVENOUS; SUBCUTANEOUS at 12:58

## 2019-01-29 RX ADMIN — INSULIN LISPRO 2 UNITS: 100 INJECTION, SOLUTION INTRAVENOUS; SUBCUTANEOUS at 21:19

## 2019-01-29 RX ADMIN — CITALOPRAM HYDROBROMIDE 10 MG: 10 TABLET ORAL at 08:01

## 2019-01-29 ASSESSMENT — ENCOUNTER SYMPTOMS
SORE THROAT: 0
NAUSEA: 0
RHINORRHEA: 0
BACK PAIN: 0
CONSTIPATION: 0
ABDOMINAL PAIN: 0
SINUS PAIN: 0
VOMITING: 0
BLOOD IN STOOL: 0
DIARRHEA: 0
SHORTNESS OF BREATH: 0
COUGH: 0
COLOR CHANGE: 0
SINUS PRESSURE: 0

## 2019-01-29 ASSESSMENT — PAIN SCALES - GENERAL
PAINLEVEL_OUTOF10: 0

## 2019-01-30 LAB
ANION GAP SERPL CALCULATED.3IONS-SCNC: 12 MMOL/L (ref 3–16)
BASOPHILS ABSOLUTE: 0 K/UL (ref 0–0.2)
BASOPHILS RELATIVE PERCENT: 0.7 %
BUN BLDV-MCNC: 20 MG/DL (ref 7–20)
CALCIUM SERPL-MCNC: 9 MG/DL (ref 8.3–10.6)
CHLORIDE BLD-SCNC: 109 MMOL/L (ref 99–110)
CO2: 23 MMOL/L (ref 21–32)
CREAT SERPL-MCNC: 1.2 MG/DL (ref 0.6–1.2)
EOSINOPHILS ABSOLUTE: 0.2 K/UL (ref 0–0.6)
EOSINOPHILS RELATIVE PERCENT: 2.7 %
ESTIMATED AVERAGE GLUCOSE: 266.1 MG/DL
GFR AFRICAN AMERICAN: 52
GFR NON-AFRICAN AMERICAN: 43
GLUCOSE BLD-MCNC: 113 MG/DL (ref 70–99)
GLUCOSE BLD-MCNC: 123 MG/DL (ref 70–99)
GLUCOSE BLD-MCNC: 131 MG/DL (ref 70–99)
GLUCOSE BLD-MCNC: 146 MG/DL (ref 70–99)
GLUCOSE BLD-MCNC: 42 MG/DL (ref 70–99)
GLUCOSE BLD-MCNC: 50 MG/DL (ref 70–99)
HBA1C MFR BLD: 10.9 %
HCT VFR BLD CALC: 37.8 % (ref 36–48)
HEMOGLOBIN: 12.4 G/DL (ref 12–16)
LV EF: 73 %
LVEF MODALITY: NORMAL
LYMPHOCYTES ABSOLUTE: 2.5 K/UL (ref 1–5.1)
LYMPHOCYTES RELATIVE PERCENT: 44.3 %
MCH RBC QN AUTO: 31.1 PG (ref 26–34)
MCHC RBC AUTO-ENTMCNC: 32.8 G/DL (ref 31–36)
MCV RBC AUTO: 94.7 FL (ref 80–100)
MONOCYTES ABSOLUTE: 0.4 K/UL (ref 0–1.3)
MONOCYTES RELATIVE PERCENT: 7.1 %
NEUTROPHILS ABSOLUTE: 2.6 K/UL (ref 1.7–7.7)
NEUTROPHILS RELATIVE PERCENT: 45.2 %
PDW BLD-RTO: 13.9 % (ref 12.4–15.4)
PERFORMED ON: ABNORMAL
PLATELET # BLD: 279 K/UL (ref 135–450)
PMV BLD AUTO: 8 FL (ref 5–10.5)
POTASSIUM SERPL-SCNC: 4 MMOL/L (ref 3.5–5.1)
RBC # BLD: 3.99 M/UL (ref 4–5.2)
SODIUM BLD-SCNC: 144 MMOL/L (ref 136–145)
WBC # BLD: 5.6 K/UL (ref 4–11)

## 2019-01-30 PROCEDURE — 6360000002 HC RX W HCPCS: Performed by: STUDENT IN AN ORGANIZED HEALTH CARE EDUCATION/TRAINING PROGRAM

## 2019-01-30 PROCEDURE — 80048 BASIC METABOLIC PNL TOTAL CA: CPT

## 2019-01-30 PROCEDURE — 6360000002 HC RX W HCPCS: Performed by: INTERNAL MEDICINE

## 2019-01-30 PROCEDURE — 6370000000 HC RX 637 (ALT 250 FOR IP): Performed by: STUDENT IN AN ORGANIZED HEALTH CARE EDUCATION/TRAINING PROGRAM

## 2019-01-30 PROCEDURE — A9502 TC99M TETROFOSMIN: HCPCS | Performed by: INTERNAL MEDICINE

## 2019-01-30 PROCEDURE — 93306 TTE W/DOPPLER COMPLETE: CPT

## 2019-01-30 PROCEDURE — 2580000003 HC RX 258: Performed by: PHYSICIAN ASSISTANT

## 2019-01-30 PROCEDURE — 36415 COLL VENOUS BLD VENIPUNCTURE: CPT

## 2019-01-30 PROCEDURE — 85025 COMPLETE CBC W/AUTO DIFF WBC: CPT

## 2019-01-30 PROCEDURE — 1200000000 HC SEMI PRIVATE

## 2019-01-30 PROCEDURE — 2580000003 HC RX 258: Performed by: INTERNAL MEDICINE

## 2019-01-30 PROCEDURE — 6370000000 HC RX 637 (ALT 250 FOR IP): Performed by: INTERNAL MEDICINE

## 2019-01-30 PROCEDURE — 3430000000 HC RX DIAGNOSTIC RADIOPHARMACEUTICAL: Performed by: INTERNAL MEDICINE

## 2019-01-30 PROCEDURE — 78452 HT MUSCLE IMAGE SPECT MULT: CPT

## 2019-01-30 PROCEDURE — 93017 CV STRESS TEST TRACING ONLY: CPT

## 2019-01-30 RX ORDER — SODIUM CHLORIDE 0.9 % (FLUSH) 0.9 %
10 SYRINGE (ML) INJECTION PRN
Status: DISCONTINUED | OUTPATIENT
Start: 2019-01-30 | End: 2019-02-01 | Stop reason: HOSPADM

## 2019-01-30 RX ADMIN — LEVOTHYROXINE SODIUM 75 MCG: 75 TABLET ORAL at 06:22

## 2019-01-30 RX ADMIN — DEXTROSE MONOHYDRATE 12.5 G: 25 INJECTION, SOLUTION INTRAVENOUS at 07:35

## 2019-01-30 RX ADMIN — INSULIN LISPRO 2 UNITS: 100 INJECTION, SOLUTION INTRAVENOUS; SUBCUTANEOUS at 21:22

## 2019-01-30 RX ADMIN — Medication 10 ML: at 07:49

## 2019-01-30 RX ADMIN — Medication 10 ML: at 09:38

## 2019-01-30 RX ADMIN — INSULIN LISPRO 7 UNITS: 100 INJECTION, SOLUTION INTRAVENOUS; SUBCUTANEOUS at 12:42

## 2019-01-30 RX ADMIN — MIRTAZAPINE 7.5 MG: 15 TABLET, FILM COATED ORAL at 21:21

## 2019-01-30 RX ADMIN — LISINOPRIL 20 MG: 20 TABLET ORAL at 11:06

## 2019-01-30 RX ADMIN — TETROFOSMIN 30 MILLICURIE: 1.38 INJECTION, POWDER, LYOPHILIZED, FOR SOLUTION INTRAVENOUS at 09:37

## 2019-01-30 RX ADMIN — PANTOPRAZOLE SODIUM 40 MG: 40 TABLET, DELAYED RELEASE ORAL at 06:21

## 2019-01-30 RX ADMIN — TRAZODONE HYDROCHLORIDE 50 MG: 50 TABLET ORAL at 11:06

## 2019-01-30 RX ADMIN — PRAVASTATIN SODIUM 80 MG: 80 TABLET ORAL at 11:06

## 2019-01-30 RX ADMIN — HEPARIN SODIUM 5000 UNITS: 5000 INJECTION INTRAVENOUS; SUBCUTANEOUS at 21:21

## 2019-01-30 RX ADMIN — LAMOTRIGINE 25 MG: 25 TABLET ORAL at 11:07

## 2019-01-30 RX ADMIN — HEPARIN SODIUM 5000 UNITS: 5000 INJECTION INTRAVENOUS; SUBCUTANEOUS at 14:53

## 2019-01-30 RX ADMIN — INSULIN LISPRO 7 UNITS: 100 INJECTION, SOLUTION INTRAVENOUS; SUBCUTANEOUS at 18:09

## 2019-01-30 RX ADMIN — TETROFOSMIN 10 MILLICURIE: 1.38 INJECTION, POWDER, LYOPHILIZED, FOR SOLUTION INTRAVENOUS at 07:48

## 2019-01-30 RX ADMIN — CITALOPRAM HYDROBROMIDE 10 MG: 10 TABLET ORAL at 11:06

## 2019-01-30 RX ADMIN — REGADENOSON 0.4 MG: 0.08 INJECTION, SOLUTION INTRAVENOUS at 09:38

## 2019-01-30 RX ADMIN — HEPARIN SODIUM 5000 UNITS: 5000 INJECTION INTRAVENOUS; SUBCUTANEOUS at 06:21

## 2019-01-31 ENCOUNTER — TELEPHONE (OUTPATIENT)
Dept: INTERNAL MEDICINE CLINIC | Age: 79
End: 2019-01-31

## 2019-01-31 LAB
GLUCOSE BLD-MCNC: 117 MG/DL (ref 70–99)
GLUCOSE BLD-MCNC: 136 MG/DL (ref 70–99)
GLUCOSE BLD-MCNC: 144 MG/DL (ref 70–99)
GLUCOSE BLD-MCNC: 152 MG/DL (ref 70–99)
GLUCOSE BLD-MCNC: 221 MG/DL (ref 70–99)
GLUCOSE BLD-MCNC: 44 MG/DL (ref 70–99)
GLUCOSE BLD-MCNC: 44 MG/DL (ref 70–99)
GLUCOSE BLD-MCNC: 47 MG/DL (ref 70–99)
GLUCOSE BLD-MCNC: 65 MG/DL (ref 70–99)
PERFORMED ON: ABNORMAL
VITAMIN D 25-HYDROXY: 39.8 NG/ML

## 2019-01-31 PROCEDURE — 6360000002 HC RX W HCPCS: Performed by: STUDENT IN AN ORGANIZED HEALTH CARE EDUCATION/TRAINING PROGRAM

## 2019-01-31 PROCEDURE — 6370000000 HC RX 637 (ALT 250 FOR IP): Performed by: STUDENT IN AN ORGANIZED HEALTH CARE EDUCATION/TRAINING PROGRAM

## 2019-01-31 PROCEDURE — 1200000000 HC SEMI PRIVATE

## 2019-01-31 PROCEDURE — 6370000000 HC RX 637 (ALT 250 FOR IP): Performed by: INTERNAL MEDICINE

## 2019-01-31 RX ADMIN — INSULIN LISPRO 7 UNITS: 100 INJECTION, SOLUTION INTRAVENOUS; SUBCUTANEOUS at 09:24

## 2019-01-31 RX ADMIN — HEPARIN SODIUM 5000 UNITS: 5000 INJECTION INTRAVENOUS; SUBCUTANEOUS at 14:00

## 2019-01-31 RX ADMIN — LEVOTHYROXINE SODIUM 75 MCG: 75 TABLET ORAL at 06:11

## 2019-01-31 RX ADMIN — PRAVASTATIN SODIUM 80 MG: 80 TABLET ORAL at 08:46

## 2019-01-31 RX ADMIN — CITALOPRAM HYDROBROMIDE 10 MG: 10 TABLET ORAL at 08:46

## 2019-01-31 RX ADMIN — LISINOPRIL 20 MG: 20 TABLET ORAL at 08:46

## 2019-01-31 RX ADMIN — TRAZODONE HYDROCHLORIDE 50 MG: 50 TABLET ORAL at 21:37

## 2019-01-31 RX ADMIN — INSULIN LISPRO 6 UNITS: 100 INJECTION, SOLUTION INTRAVENOUS; SUBCUTANEOUS at 09:26

## 2019-01-31 RX ADMIN — HEPARIN SODIUM 5000 UNITS: 5000 INJECTION INTRAVENOUS; SUBCUTANEOUS at 21:38

## 2019-01-31 RX ADMIN — INSULIN LISPRO 7 UNITS: 100 INJECTION, SOLUTION INTRAVENOUS; SUBCUTANEOUS at 17:28

## 2019-01-31 RX ADMIN — LAMOTRIGINE 25 MG: 25 TABLET ORAL at 08:46

## 2019-01-31 RX ADMIN — MIRTAZAPINE 7.5 MG: 15 TABLET, FILM COATED ORAL at 21:37

## 2019-01-31 RX ADMIN — INSULIN LISPRO 3 UNITS: 100 INJECTION, SOLUTION INTRAVENOUS; SUBCUTANEOUS at 17:27

## 2019-01-31 RX ADMIN — HEPARIN SODIUM 5000 UNITS: 5000 INJECTION INTRAVENOUS; SUBCUTANEOUS at 06:11

## 2019-01-31 ASSESSMENT — PAIN SCALES - GENERAL: PAINLEVEL_OUTOF10: 0

## 2019-02-01 ENCOUNTER — TELEPHONE (OUTPATIENT)
Dept: INTERNAL MEDICINE CLINIC | Age: 79
End: 2019-02-01

## 2019-02-01 VITALS
RESPIRATION RATE: 16 BRPM | BODY MASS INDEX: 31.34 KG/M2 | SYSTOLIC BLOOD PRESSURE: 142 MMHG | WEIGHT: 195 LBS | DIASTOLIC BLOOD PRESSURE: 97 MMHG | TEMPERATURE: 98.3 F | HEIGHT: 66 IN | HEART RATE: 69 BPM | OXYGEN SATURATION: 97 %

## 2019-02-01 LAB
GLUCOSE BLD-MCNC: 188 MG/DL (ref 70–99)
GLUCOSE BLD-MCNC: 306 MG/DL (ref 70–99)
GLUCOSE BLD-MCNC: 329 MG/DL (ref 70–99)
GLUCOSE BLD-MCNC: 366 MG/DL (ref 70–99)
GLUCOSE BLD-MCNC: 375 MG/DL (ref 70–99)
GLUCOSE BLD-MCNC: 392 MG/DL (ref 70–99)
PERFORMED ON: ABNORMAL

## 2019-02-01 PROCEDURE — 6370000000 HC RX 637 (ALT 250 FOR IP): Performed by: STUDENT IN AN ORGANIZED HEALTH CARE EDUCATION/TRAINING PROGRAM

## 2019-02-01 PROCEDURE — 6370000000 HC RX 637 (ALT 250 FOR IP): Performed by: INTERNAL MEDICINE

## 2019-02-01 PROCEDURE — 6360000002 HC RX W HCPCS: Performed by: STUDENT IN AN ORGANIZED HEALTH CARE EDUCATION/TRAINING PROGRAM

## 2019-02-01 RX ORDER — INSULIN GLARGINE 100 [IU]/ML
5 INJECTION, SOLUTION SUBCUTANEOUS DAILY
Qty: 1 VIAL | Refills: 1 | Status: SHIPPED | OUTPATIENT
Start: 2019-02-01 | End: 2019-02-04

## 2019-02-01 RX ADMIN — INSULIN HUMAN 5 UNITS: 100 INJECTION, SOLUTION PARENTERAL at 18:27

## 2019-02-01 RX ADMIN — HEPARIN SODIUM 5000 UNITS: 5000 INJECTION INTRAVENOUS; SUBCUTANEOUS at 06:00

## 2019-02-01 RX ADMIN — INSULIN GLARGINE 5 UNITS: 100 INJECTION, SOLUTION SUBCUTANEOUS at 15:57

## 2019-02-01 RX ADMIN — LISINOPRIL 20 MG: 20 TABLET ORAL at 09:42

## 2019-02-01 RX ADMIN — INSULIN LISPRO 7 UNITS: 100 INJECTION, SOLUTION INTRAVENOUS; SUBCUTANEOUS at 09:44

## 2019-02-01 RX ADMIN — PRAVASTATIN SODIUM 80 MG: 80 TABLET ORAL at 09:42

## 2019-02-01 RX ADMIN — CITALOPRAM HYDROBROMIDE 10 MG: 10 TABLET ORAL at 09:42

## 2019-02-01 RX ADMIN — INSULIN HUMAN 5 UNITS: 100 INJECTION, SOLUTION PARENTERAL at 16:19

## 2019-02-01 RX ADMIN — LEVOTHYROXINE SODIUM 75 MCG: 75 TABLET ORAL at 06:00

## 2019-02-01 RX ADMIN — LAMOTRIGINE 25 MG: 25 TABLET ORAL at 09:42

## 2019-02-01 RX ADMIN — INSULIN LISPRO 3 UNITS: 100 INJECTION, SOLUTION INTRAVENOUS; SUBCUTANEOUS at 09:43

## 2019-02-01 RX ADMIN — PANTOPRAZOLE SODIUM 40 MG: 40 TABLET, DELAYED RELEASE ORAL at 06:00

## 2019-02-01 ASSESSMENT — PAIN SCALES - GENERAL: PAINLEVEL_OUTOF10: 0

## 2019-02-04 ENCOUNTER — TELEPHONE (OUTPATIENT)
Dept: INTERNAL MEDICINE CLINIC | Age: 79
End: 2019-02-04

## 2019-02-04 ENCOUNTER — CARE COORDINATION (OUTPATIENT)
Dept: CARE COORDINATION | Age: 79
End: 2019-02-04

## 2019-02-04 ENCOUNTER — OFFICE VISIT (OUTPATIENT)
Dept: INTERNAL MEDICINE CLINIC | Age: 79
End: 2019-02-04
Payer: MEDICARE

## 2019-02-04 VITALS — DIASTOLIC BLOOD PRESSURE: 60 MMHG | OXYGEN SATURATION: 94 % | SYSTOLIC BLOOD PRESSURE: 110 MMHG | HEART RATE: 78 BPM

## 2019-02-04 DIAGNOSIS — Z79.4 TYPE 2 DIABETES MELLITUS TREATED WITH INSULIN (HCC): ICD-10-CM

## 2019-02-04 DIAGNOSIS — I10 ESSENTIAL HYPERTENSION: ICD-10-CM

## 2019-02-04 DIAGNOSIS — F33.1 MAJOR DEPRESSIVE DISORDER, RECURRENT, MODERATE (HCC): ICD-10-CM

## 2019-02-04 DIAGNOSIS — E11.10 DKA, TYPE 2, NOT AT GOAL (HCC): Primary | ICD-10-CM

## 2019-02-04 DIAGNOSIS — E11.9 TYPE 2 DIABETES MELLITUS TREATED WITH INSULIN (HCC): ICD-10-CM

## 2019-02-04 LAB — GLUCOSE BLD-MCNC: 279 MG/DL

## 2019-02-04 PROCEDURE — 1036F TOBACCO NON-USER: CPT | Performed by: NURSE PRACTITIONER

## 2019-02-04 PROCEDURE — 99214 OFFICE O/P EST MOD 30 MIN: CPT | Performed by: NURSE PRACTITIONER

## 2019-02-04 PROCEDURE — 4040F PNEUMOC VAC/ADMIN/RCVD: CPT | Performed by: NURSE PRACTITIONER

## 2019-02-04 PROCEDURE — 1123F ACP DISCUSS/DSCN MKR DOCD: CPT | Performed by: NURSE PRACTITIONER

## 2019-02-04 PROCEDURE — 82962 GLUCOSE BLOOD TEST: CPT | Performed by: NURSE PRACTITIONER

## 2019-02-04 PROCEDURE — G8417 CALC BMI ABV UP PARAM F/U: HCPCS | Performed by: NURSE PRACTITIONER

## 2019-02-04 PROCEDURE — 1111F DSCHRG MED/CURRENT MED MERGE: CPT | Performed by: NURSE PRACTITIONER

## 2019-02-04 PROCEDURE — G8427 DOCREV CUR MEDS BY ELIG CLIN: HCPCS | Performed by: NURSE PRACTITIONER

## 2019-02-04 PROCEDURE — G8484 FLU IMMUNIZE NO ADMIN: HCPCS | Performed by: NURSE PRACTITIONER

## 2019-02-04 PROCEDURE — 1090F PRES/ABSN URINE INCON ASSESS: CPT | Performed by: NURSE PRACTITIONER

## 2019-02-04 PROCEDURE — 1101F PT FALLS ASSESS-DOCD LE1/YR: CPT | Performed by: NURSE PRACTITIONER

## 2019-02-04 PROCEDURE — G8400 PT W/DXA NO RESULTS DOC: HCPCS | Performed by: NURSE PRACTITIONER

## 2019-02-04 RX ORDER — CALCIUM CARB/VITAMIN D3/VIT K1 500-100-40
1 TABLET,CHEWABLE ORAL DAILY
Qty: 100 EACH | Refills: 3 | Status: SHIPPED | OUTPATIENT
Start: 2019-02-04 | End: 2019-02-22 | Stop reason: CLARIF

## 2019-02-04 ASSESSMENT — ENCOUNTER SYMPTOMS
COUGH: 0
WHEEZING: 0
BLOOD IN STOOL: 0
SINUS PRESSURE: 0
EYE REDNESS: 0
EYE ITCHING: 0
ABDOMINAL PAIN: 0
VOMITING: 0
SORE THROAT: 0
BACK PAIN: 0
CONSTIPATION: 0
SHORTNESS OF BREATH: 0
COLOR CHANGE: 0
NAUSEA: 0
CHEST TIGHTNESS: 0
DIARRHEA: 0
RHINORRHEA: 0

## 2019-02-05 ENCOUNTER — TELEPHONE (OUTPATIENT)
Dept: INTERNAL MEDICINE CLINIC | Age: 79
End: 2019-02-05

## 2019-02-11 ENCOUNTER — TELEPHONE (OUTPATIENT)
Dept: INTERNAL MEDICINE CLINIC | Age: 79
End: 2019-02-11

## 2019-02-19 RX ORDER — PRAVASTATIN SODIUM 80 MG/1
TABLET ORAL
Qty: 90 TABLET | Refills: 0 | Status: SHIPPED | OUTPATIENT
Start: 2019-02-19 | End: 2019-02-22 | Stop reason: CLARIF

## 2019-02-22 ENCOUNTER — TELEPHONE (OUTPATIENT)
Dept: INTERNAL MEDICINE CLINIC | Age: 79
End: 2019-02-22

## 2019-02-22 ENCOUNTER — APPOINTMENT (OUTPATIENT)
Dept: GENERAL RADIOLOGY | Age: 79
DRG: 639 | End: 2019-02-22
Payer: MEDICARE

## 2019-02-22 ENCOUNTER — HOSPITAL ENCOUNTER (INPATIENT)
Age: 79
LOS: 2 days | Discharge: HOME | DRG: 639 | End: 2019-02-24
Attending: EMERGENCY MEDICINE | Admitting: INTERNAL MEDICINE
Payer: MEDICARE

## 2019-02-22 DIAGNOSIS — E11.10 DIABETIC KETOACIDOSIS WITHOUT COMA ASSOCIATED WITH TYPE 2 DIABETES MELLITUS (HCC): Primary | ICD-10-CM

## 2019-02-22 LAB
A/G RATIO: 1.2 (ref 1.1–2.2)
ALBUMIN SERPL-MCNC: 3.4 G/DL (ref 3.4–5)
ALP BLD-CCNC: 114 U/L (ref 40–129)
ALT SERPL-CCNC: 12 U/L (ref 10–40)
ANION GAP SERPL CALCULATED.3IONS-SCNC: 19 MMOL/L (ref 3–16)
ANION GAP SERPL CALCULATED.3IONS-SCNC: 20 MMOL/L (ref 3–16)
ANION GAP SERPL CALCULATED.3IONS-SCNC: 32 MMOL/L (ref 3–16)
AST SERPL-CCNC: 12 U/L (ref 15–37)
BASE EXCESS VENOUS: -17 (ref -3–3)
BASOPHILS ABSOLUTE: 0.1 K/UL (ref 0–0.2)
BASOPHILS RELATIVE PERCENT: 0.8 %
BILIRUB SERPL-MCNC: <0.2 MG/DL (ref 0–1)
BILIRUBIN URINE: ABNORMAL
BLOOD, URINE: NEGATIVE
BUN BLDV-MCNC: 26 MG/DL (ref 7–20)
BUN BLDV-MCNC: 28 MG/DL (ref 7–20)
BUN BLDV-MCNC: 31 MG/DL (ref 7–20)
CALCIUM SERPL-MCNC: 9.1 MG/DL (ref 8.3–10.6)
CALCIUM SERPL-MCNC: 9.2 MG/DL (ref 8.3–10.6)
CALCIUM SERPL-MCNC: 9.6 MG/DL (ref 8.3–10.6)
CHLORIDE BLD-SCNC: 103 MMOL/L (ref 99–110)
CHLORIDE BLD-SCNC: 89 MMOL/L (ref 99–110)
CHLORIDE BLD-SCNC: 99 MMOL/L (ref 99–110)
CLARITY: CLEAR
CO2: 10 MMOL/L (ref 21–32)
CO2: 16 MMOL/L (ref 21–32)
CO2: 18 MMOL/L (ref 21–32)
COLOR: YELLOW
CREAT SERPL-MCNC: 1.3 MG/DL (ref 0.6–1.2)
CREAT SERPL-MCNC: 1.6 MG/DL (ref 0.6–1.2)
CREAT SERPL-MCNC: 1.7 MG/DL (ref 0.6–1.2)
EKG ATRIAL RATE: 102 BPM
EKG DIAGNOSIS: NORMAL
EKG P AXIS: 50 DEGREES
EKG P-R INTERVAL: 168 MS
EKG Q-T INTERVAL: 380 MS
EKG QRS DURATION: 116 MS
EKG QTC CALCULATION (BAZETT): 495 MS
EKG R AXIS: -20 DEGREES
EKG T AXIS: 14 DEGREES
EKG VENTRICULAR RATE: 102 BPM
EOSINOPHILS ABSOLUTE: 0 K/UL (ref 0–0.6)
EOSINOPHILS RELATIVE PERCENT: 0 %
EPITHELIAL CELLS, UA: NORMAL /HPF
GFR AFRICAN AMERICAN: 35
GFR AFRICAN AMERICAN: 38
GFR AFRICAN AMERICAN: 48
GFR NON-AFRICAN AMERICAN: 29
GFR NON-AFRICAN AMERICAN: 31
GFR NON-AFRICAN AMERICAN: 40
GLOBULIN: 2.8 G/DL
GLUCOSE BLD-MCNC: 148 MG/DL (ref 70–99)
GLUCOSE BLD-MCNC: 152 MG/DL (ref 70–99)
GLUCOSE BLD-MCNC: 153 MG/DL (ref 70–99)
GLUCOSE BLD-MCNC: 163 MG/DL (ref 70–99)
GLUCOSE BLD-MCNC: 189 MG/DL (ref 70–99)
GLUCOSE BLD-MCNC: 239 MG/DL (ref 70–99)
GLUCOSE BLD-MCNC: 249 MG/DL (ref 70–99)
GLUCOSE BLD-MCNC: 361 MG/DL (ref 70–99)
GLUCOSE BLD-MCNC: 522 MG/DL (ref 70–99)
GLUCOSE BLD-MCNC: 586 MG/DL (ref 70–99)
GLUCOSE BLD-MCNC: 625 MG/DL (ref 70–99)
GLUCOSE URINE: >=1000 MG/DL
HCO3 VENOUS: 11.3 MMOL/L (ref 23–29)
HCT VFR BLD CALC: 40.9 % (ref 36–48)
HEMOGLOBIN: 13.2 G/DL (ref 12–16)
KETONES, URINE: >=80 MG/DL
LACTATE: 2.37 MMOL/L (ref 0.4–2)
LEUKOCYTE ESTERASE, URINE: NEGATIVE
LYMPHOCYTES ABSOLUTE: 0.8 K/UL (ref 1–5.1)
LYMPHOCYTES RELATIVE PERCENT: 8.5 %
MAGNESIUM: 2 MG/DL (ref 1.8–2.4)
MAGNESIUM: 2.3 MG/DL (ref 1.8–2.4)
MCH RBC QN AUTO: 31.1 PG (ref 26–34)
MCHC RBC AUTO-ENTMCNC: 32.3 G/DL (ref 31–36)
MCV RBC AUTO: 96.3 FL (ref 80–100)
MICROSCOPIC EXAMINATION: YES
MONOCYTES ABSOLUTE: 0.2 K/UL (ref 0–1.3)
MONOCYTES RELATIVE PERCENT: 2.5 %
NEUTROPHILS ABSOLUTE: 8.4 K/UL (ref 1.7–7.7)
NEUTROPHILS RELATIVE PERCENT: 88.2 %
NITRITE, URINE: NEGATIVE
O2 SAT, VEN: 66 %
PCO2, VEN: 29.2 MM HG (ref 40–50)
PDW BLD-RTO: 14 % (ref 12.4–15.4)
PERFORMED ON: ABNORMAL
PH UA: 5.5
PH VENOUS: 7.19 (ref 7.35–7.45)
PHOSPHORUS: 3.4 MG/DL (ref 2.5–4.9)
PLATELET # BLD: 329 K/UL (ref 135–450)
PMV BLD AUTO: 9.4 FL (ref 5–10.5)
PO2, VEN: 41 MM HG
POC SAMPLE TYPE: ABNORMAL
POTASSIUM SERPL-SCNC: 4.3 MMOL/L (ref 3.5–5.1)
POTASSIUM SERPL-SCNC: 4.7 MMOL/L (ref 3.5–5.1)
POTASSIUM SERPL-SCNC: 5.2 MMOL/L (ref 3.5–5.1)
POTASSIUM SERPL-SCNC: 5.8 MMOL/L (ref 3.5–5.1)
PROTEIN UA: ABNORMAL MG/DL
RBC # BLD: 4.25 M/UL (ref 4–5.2)
RBC UA: NORMAL /HPF (ref 0–2)
SODIUM BLD-SCNC: 131 MMOL/L (ref 136–145)
SODIUM BLD-SCNC: 135 MMOL/L (ref 136–145)
SODIUM BLD-SCNC: 140 MMOL/L (ref 136–145)
SPECIFIC GRAVITY UA: 1.02
TCO2 CALC VENOUS: 12 MMOL/L
TOTAL PROTEIN: 6.2 G/DL (ref 6.4–8.2)
TROPONIN: <0.01 NG/ML
URINE TYPE: ABNORMAL
UROBILINOGEN, URINE: 0.2 E.U./DL
WBC # BLD: 9.5 K/UL (ref 4–11)
WBC UA: NORMAL /HPF (ref 0–5)

## 2019-02-22 PROCEDURE — 85025 COMPLETE CBC W/AUTO DIFF WBC: CPT

## 2019-02-22 PROCEDURE — 36415 COLL VENOUS BLD VENIPUNCTURE: CPT

## 2019-02-22 PROCEDURE — 6370000000 HC RX 637 (ALT 250 FOR IP): Performed by: EMERGENCY MEDICINE

## 2019-02-22 PROCEDURE — 71046 X-RAY EXAM CHEST 2 VIEWS: CPT

## 2019-02-22 PROCEDURE — 84484 ASSAY OF TROPONIN QUANT: CPT

## 2019-02-22 PROCEDURE — 84132 ASSAY OF SERUM POTASSIUM: CPT

## 2019-02-22 PROCEDURE — 81001 URINALYSIS AUTO W/SCOPE: CPT

## 2019-02-22 PROCEDURE — 2000000000 HC ICU R&B

## 2019-02-22 PROCEDURE — 2580000003 HC RX 258: Performed by: INTERNAL MEDICINE

## 2019-02-22 PROCEDURE — 99285 EMERGENCY DEPT VISIT HI MDM: CPT

## 2019-02-22 PROCEDURE — 96361 HYDRATE IV INFUSION ADD-ON: CPT

## 2019-02-22 PROCEDURE — 80053 COMPREHEN METABOLIC PANEL: CPT

## 2019-02-22 PROCEDURE — 6360000002 HC RX W HCPCS: Performed by: STUDENT IN AN ORGANIZED HEALTH CARE EDUCATION/TRAINING PROGRAM

## 2019-02-22 PROCEDURE — 2580000003 HC RX 258: Performed by: STUDENT IN AN ORGANIZED HEALTH CARE EDUCATION/TRAINING PROGRAM

## 2019-02-22 PROCEDURE — 82010 KETONE BODYS QUAN: CPT

## 2019-02-22 PROCEDURE — 6370000000 HC RX 637 (ALT 250 FOR IP): Performed by: STUDENT IN AN ORGANIZED HEALTH CARE EDUCATION/TRAINING PROGRAM

## 2019-02-22 PROCEDURE — 83605 ASSAY OF LACTIC ACID: CPT

## 2019-02-22 PROCEDURE — 96365 THER/PROPH/DIAG IV INF INIT: CPT

## 2019-02-22 PROCEDURE — 83735 ASSAY OF MAGNESIUM: CPT

## 2019-02-22 PROCEDURE — 84100 ASSAY OF PHOSPHORUS: CPT

## 2019-02-22 PROCEDURE — 93005 ELECTROCARDIOGRAM TRACING: CPT | Performed by: EMERGENCY MEDICINE

## 2019-02-22 PROCEDURE — 2580000003 HC RX 258: Performed by: EMERGENCY MEDICINE

## 2019-02-22 PROCEDURE — 82803 BLOOD GASES ANY COMBINATION: CPT

## 2019-02-22 RX ORDER — SODIUM CHLORIDE, SODIUM LACTATE, POTASSIUM CHLORIDE, AND CALCIUM CHLORIDE .6; .31; .03; .02 G/100ML; G/100ML; G/100ML; G/100ML
1000 INJECTION, SOLUTION INTRAVENOUS ONCE
Status: COMPLETED | OUTPATIENT
Start: 2019-02-22 | End: 2019-02-22

## 2019-02-22 RX ORDER — DEXTROSE AND SODIUM CHLORIDE 5; .45 G/100ML; G/100ML
INJECTION, SOLUTION INTRAVENOUS CONTINUOUS PRN
Status: DISCONTINUED | OUTPATIENT
Start: 2019-02-22 | End: 2019-02-23

## 2019-02-22 RX ORDER — DEXTROSE MONOHYDRATE 25 G/50ML
12.5 INJECTION, SOLUTION INTRAVENOUS PRN
Status: DISCONTINUED | OUTPATIENT
Start: 2019-02-22 | End: 2019-02-22 | Stop reason: SDUPTHER

## 2019-02-22 RX ORDER — M-VIT,TX,IRON,MINS/CALC/FOLIC 27MG-0.4MG
1 TABLET ORAL DAILY
Status: DISCONTINUED | OUTPATIENT
Start: 2019-02-22 | End: 2019-02-24 | Stop reason: HOSPADM

## 2019-02-22 RX ORDER — DEXTROSE AND SODIUM CHLORIDE 5; .45 G/100ML; G/100ML
INJECTION, SOLUTION INTRAVENOUS CONTINUOUS
Status: DISCONTINUED | OUTPATIENT
Start: 2019-02-22 | End: 2019-02-23

## 2019-02-22 RX ORDER — DEXTROSE MONOHYDRATE 25 G/50ML
12.5 INJECTION, SOLUTION INTRAVENOUS PRN
Status: DISCONTINUED | OUTPATIENT
Start: 2019-02-22 | End: 2019-02-24 | Stop reason: HOSPADM

## 2019-02-22 RX ORDER — DEXTROSE MONOHYDRATE 50 MG/ML
100 INJECTION, SOLUTION INTRAVENOUS PRN
Status: DISCONTINUED | OUTPATIENT
Start: 2019-02-22 | End: 2019-02-24 | Stop reason: HOSPADM

## 2019-02-22 RX ORDER — PRAVASTATIN SODIUM 80 MG/1
80 TABLET ORAL NIGHTLY
Status: DISCONTINUED | OUTPATIENT
Start: 2019-02-22 | End: 2019-02-24 | Stop reason: HOSPADM

## 2019-02-22 RX ORDER — 0.9 % SODIUM CHLORIDE 0.9 %
2000 INTRAVENOUS SOLUTION INTRAVENOUS ONCE
Status: DISCONTINUED | OUTPATIENT
Start: 2019-02-22 | End: 2019-02-22

## 2019-02-22 RX ORDER — LEVOTHYROXINE SODIUM 0.07 MG/1
75 TABLET ORAL DAILY
Status: DISCONTINUED | OUTPATIENT
Start: 2019-02-23 | End: 2019-02-24 | Stop reason: HOSPADM

## 2019-02-22 RX ORDER — POTASSIUM CHLORIDE 7.45 MG/ML
10 INJECTION INTRAVENOUS PRN
Status: DISCONTINUED | OUTPATIENT
Start: 2019-02-22 | End: 2019-02-24 | Stop reason: HOSPADM

## 2019-02-22 RX ORDER — CITALOPRAM 10 MG/1
10 TABLET ORAL DAILY
Status: DISCONTINUED | OUTPATIENT
Start: 2019-02-23 | End: 2019-02-24 | Stop reason: HOSPADM

## 2019-02-22 RX ORDER — SODIUM CHLORIDE 450 MG/100ML
INJECTION, SOLUTION INTRAVENOUS CONTINUOUS
Status: DISCONTINUED | OUTPATIENT
Start: 2019-02-22 | End: 2019-02-22

## 2019-02-22 RX ORDER — PRAVASTATIN SODIUM 80 MG/1
80 TABLET ORAL NIGHTLY
COMMUNITY
End: 2019-05-20

## 2019-02-22 RX ORDER — TRAZODONE HYDROCHLORIDE 50 MG/1
50 TABLET ORAL NIGHTLY
COMMUNITY
End: 2019-03-11

## 2019-02-22 RX ORDER — SODIUM CHLORIDE 0.9 % (FLUSH) 0.9 %
10 SYRINGE (ML) INJECTION EVERY 12 HOURS SCHEDULED
Status: DISCONTINUED | OUTPATIENT
Start: 2019-02-22 | End: 2019-02-24 | Stop reason: HOSPADM

## 2019-02-22 RX ORDER — LAMOTRIGINE 25 MG/1
25 TABLET ORAL DAILY
Status: DISCONTINUED | OUTPATIENT
Start: 2019-02-23 | End: 2019-02-24 | Stop reason: HOSPADM

## 2019-02-22 RX ORDER — DIPHENHYDRAMINE HCL 25 MG
25 TABLET ORAL EVERY 6 HOURS PRN
Status: DISCONTINUED | OUTPATIENT
Start: 2019-02-22 | End: 2019-02-24 | Stop reason: HOSPADM

## 2019-02-22 RX ORDER — NICOTINE POLACRILEX 4 MG
15 LOZENGE BUCCAL PRN
Status: DISCONTINUED | OUTPATIENT
Start: 2019-02-22 | End: 2019-02-24 | Stop reason: HOSPADM

## 2019-02-22 RX ORDER — ONDANSETRON 2 MG/ML
4 INJECTION INTRAMUSCULAR; INTRAVENOUS EVERY 6 HOURS PRN
Status: DISCONTINUED | OUTPATIENT
Start: 2019-02-22 | End: 2019-02-24 | Stop reason: HOSPADM

## 2019-02-22 RX ORDER — DEXTROSE AND SODIUM CHLORIDE 5; .45 G/100ML; G/100ML
INJECTION, SOLUTION INTRAVENOUS
Status: DISPENSED
Start: 2019-02-22 | End: 2019-02-23

## 2019-02-22 RX ORDER — SODIUM CHLORIDE 0.9 % (FLUSH) 0.9 %
10 SYRINGE (ML) INJECTION PRN
Status: DISCONTINUED | OUTPATIENT
Start: 2019-02-22 | End: 2019-02-24 | Stop reason: HOSPADM

## 2019-02-22 RX ORDER — MIRTAZAPINE 15 MG/1
7.5 TABLET, FILM COATED ORAL NIGHTLY
Status: DISCONTINUED | OUTPATIENT
Start: 2019-02-22 | End: 2019-02-24 | Stop reason: HOSPADM

## 2019-02-22 RX ORDER — TRAZODONE HYDROCHLORIDE 50 MG/1
50 TABLET ORAL NIGHTLY
Status: DISCONTINUED | OUTPATIENT
Start: 2019-02-22 | End: 2019-02-24 | Stop reason: HOSPADM

## 2019-02-22 RX ADMIN — Medication 10 ML: at 21:41

## 2019-02-22 RX ADMIN — DEXTROSE AND SODIUM CHLORIDE: 5; 450 INJECTION, SOLUTION INTRAVENOUS at 19:54

## 2019-02-22 RX ADMIN — TRAZODONE HYDROCHLORIDE 50 MG: 50 TABLET ORAL at 21:41

## 2019-02-22 RX ADMIN — PRAVASTATIN SODIUM 80 MG: 80 TABLET ORAL at 21:41

## 2019-02-22 RX ADMIN — ENOXAPARIN SODIUM 30 MG: 30 INJECTION SUBCUTANEOUS at 21:41

## 2019-02-22 RX ADMIN — SODIUM CHLORIDE, POTASSIUM CHLORIDE, SODIUM LACTATE AND CALCIUM CHLORIDE 1000 ML: 600; 310; 30; 20 INJECTION, SOLUTION INTRAVENOUS at 13:33

## 2019-02-22 RX ADMIN — SODIUM CHLORIDE 0.1 UNITS/KG/HR: 9 INJECTION, SOLUTION INTRAVENOUS at 14:51

## 2019-02-22 RX ADMIN — SODIUM CHLORIDE 1000 ML: 9 INJECTION, SOLUTION INTRAVENOUS at 18:44

## 2019-02-22 RX ADMIN — MULTIPLE VITAMINS W/ MINERALS TAB 1 TABLET: TAB at 21:40

## 2019-02-22 RX ADMIN — MIRTAZAPINE 7.5 MG: 15 TABLET, FILM COATED ORAL at 21:41

## 2019-02-22 RX ADMIN — SODIUM CHLORIDE, POTASSIUM CHLORIDE, SODIUM LACTATE AND CALCIUM CHLORIDE 1000 ML: 600; 310; 30; 20 INJECTION, SOLUTION INTRAVENOUS at 15:09

## 2019-02-22 ASSESSMENT — ENCOUNTER SYMPTOMS
PHOTOPHOBIA: 0
ABDOMINAL PAIN: 0
DIARRHEA: 0
COUGH: 0
NAUSEA: 1
VOMITING: 1
TROUBLE SWALLOWING: 0
CONSTIPATION: 0
SORE THROAT: 0
SHORTNESS OF BREATH: 1

## 2019-02-22 ASSESSMENT — PAIN SCALES - GENERAL
PAINLEVEL_OUTOF10: 0

## 2019-02-23 LAB
ANION GAP SERPL CALCULATED.3IONS-SCNC: 13 MMOL/L (ref 3–16)
ANION GAP SERPL CALCULATED.3IONS-SCNC: 14 MMOL/L (ref 3–16)
ANION GAP SERPL CALCULATED.3IONS-SCNC: 15 MMOL/L (ref 3–16)
BASOPHILS ABSOLUTE: 0 K/UL (ref 0–0.2)
BASOPHILS RELATIVE PERCENT: 0.3 %
BETA-HYDROXYBUTYRATE: 2.14 MMOL/L (ref 0–0.27)
BUN BLDV-MCNC: 18 MG/DL (ref 7–20)
BUN BLDV-MCNC: 20 MG/DL (ref 7–20)
BUN BLDV-MCNC: 23 MG/DL (ref 7–20)
CALCIUM SERPL-MCNC: 8.7 MG/DL (ref 8.3–10.6)
CALCIUM SERPL-MCNC: 9.3 MG/DL (ref 8.3–10.6)
CALCIUM SERPL-MCNC: 9.4 MG/DL (ref 8.3–10.6)
CHLORIDE BLD-SCNC: 103 MMOL/L (ref 99–110)
CHLORIDE BLD-SCNC: 104 MMOL/L (ref 99–110)
CHLORIDE BLD-SCNC: 104 MMOL/L (ref 99–110)
CO2: 17 MMOL/L (ref 21–32)
CO2: 20 MMOL/L (ref 21–32)
CO2: 21 MMOL/L (ref 21–32)
CREAT SERPL-MCNC: 1.2 MG/DL (ref 0.6–1.2)
CREAT SERPL-MCNC: 1.2 MG/DL (ref 0.6–1.2)
CREAT SERPL-MCNC: 1.3 MG/DL (ref 0.6–1.2)
EOSINOPHILS ABSOLUTE: 0.2 K/UL (ref 0–0.6)
EOSINOPHILS RELATIVE PERCENT: 2 %
GFR AFRICAN AMERICAN: 48
GFR AFRICAN AMERICAN: 52
GFR AFRICAN AMERICAN: 52
GFR NON-AFRICAN AMERICAN: 40
GFR NON-AFRICAN AMERICAN: 43
GFR NON-AFRICAN AMERICAN: 43
GLUCOSE BLD-MCNC: 101 MG/DL (ref 70–99)
GLUCOSE BLD-MCNC: 123 MG/DL (ref 70–99)
GLUCOSE BLD-MCNC: 138 MG/DL (ref 70–99)
GLUCOSE BLD-MCNC: 139 MG/DL (ref 70–99)
GLUCOSE BLD-MCNC: 150 MG/DL (ref 70–99)
GLUCOSE BLD-MCNC: 165 MG/DL (ref 70–99)
GLUCOSE BLD-MCNC: 165 MG/DL (ref 70–99)
GLUCOSE BLD-MCNC: 169 MG/DL (ref 70–99)
GLUCOSE BLD-MCNC: 173 MG/DL (ref 70–99)
GLUCOSE BLD-MCNC: 179 MG/DL (ref 70–99)
GLUCOSE BLD-MCNC: 185 MG/DL (ref 70–99)
GLUCOSE BLD-MCNC: 186 MG/DL (ref 70–99)
GLUCOSE BLD-MCNC: 194 MG/DL (ref 70–99)
GLUCOSE BLD-MCNC: 197 MG/DL (ref 70–99)
GLUCOSE BLD-MCNC: 202 MG/DL (ref 70–99)
GLUCOSE BLD-MCNC: 204 MG/DL (ref 70–99)
GLUCOSE BLD-MCNC: 313 MG/DL (ref 70–99)
GLUCOSE BLD-MCNC: 327 MG/DL (ref 70–99)
HCT VFR BLD CALC: 38.9 % (ref 36–48)
HEMOGLOBIN: 12.6 G/DL (ref 12–16)
LYMPHOCYTES ABSOLUTE: 3.4 K/UL (ref 1–5.1)
LYMPHOCYTES RELATIVE PERCENT: 40.4 %
MAGNESIUM: 1.9 MG/DL (ref 1.8–2.4)
MAGNESIUM: 1.9 MG/DL (ref 1.8–2.4)
MAGNESIUM: 2.1 MG/DL (ref 1.8–2.4)
MCH RBC QN AUTO: 31.8 PG (ref 26–34)
MCHC RBC AUTO-ENTMCNC: 32.3 G/DL (ref 31–36)
MCV RBC AUTO: 98.3 FL (ref 80–100)
MONOCYTES ABSOLUTE: 0.6 K/UL (ref 0–1.3)
MONOCYTES RELATIVE PERCENT: 7.2 %
NEUTROPHILS ABSOLUTE: 4.2 K/UL (ref 1.7–7.7)
NEUTROPHILS RELATIVE PERCENT: 50.1 %
PDW BLD-RTO: 13.8 % (ref 12.4–15.4)
PERFORMED ON: ABNORMAL
PHOSPHORUS: 2.8 MG/DL (ref 2.5–4.9)
PHOSPHORUS: 3.1 MG/DL (ref 2.5–4.9)
PHOSPHORUS: 3.4 MG/DL (ref 2.5–4.9)
PLATELET # BLD: 273 K/UL (ref 135–450)
PMV BLD AUTO: 8.4 FL (ref 5–10.5)
POTASSIUM SERPL-SCNC: 3.6 MMOL/L (ref 3.5–5.1)
POTASSIUM SERPL-SCNC: 4.2 MMOL/L (ref 3.5–5.1)
POTASSIUM SERPL-SCNC: 4.4 MMOL/L (ref 3.5–5.1)
RBC # BLD: 3.96 M/UL (ref 4–5.2)
SODIUM BLD-SCNC: 134 MMOL/L (ref 136–145)
SODIUM BLD-SCNC: 138 MMOL/L (ref 136–145)
SODIUM BLD-SCNC: 139 MMOL/L (ref 136–145)
WBC # BLD: 8.4 K/UL (ref 4–11)

## 2019-02-23 PROCEDURE — 6360000002 HC RX W HCPCS: Performed by: STUDENT IN AN ORGANIZED HEALTH CARE EDUCATION/TRAINING PROGRAM

## 2019-02-23 PROCEDURE — 6370000000 HC RX 637 (ALT 250 FOR IP): Performed by: STUDENT IN AN ORGANIZED HEALTH CARE EDUCATION/TRAINING PROGRAM

## 2019-02-23 PROCEDURE — 84100 ASSAY OF PHOSPHORUS: CPT

## 2019-02-23 PROCEDURE — 2580000003 HC RX 258: Performed by: STUDENT IN AN ORGANIZED HEALTH CARE EDUCATION/TRAINING PROGRAM

## 2019-02-23 PROCEDURE — 2580000003 HC RX 258: Performed by: INTERNAL MEDICINE

## 2019-02-23 PROCEDURE — 94760 N-INVAS EAR/PLS OXIMETRY 1: CPT

## 2019-02-23 PROCEDURE — 83735 ASSAY OF MAGNESIUM: CPT

## 2019-02-23 PROCEDURE — 2000000000 HC ICU R&B

## 2019-02-23 PROCEDURE — 85025 COMPLETE CBC W/AUTO DIFF WBC: CPT

## 2019-02-23 PROCEDURE — 80048 BASIC METABOLIC PNL TOTAL CA: CPT

## 2019-02-23 PROCEDURE — 99222 1ST HOSP IP/OBS MODERATE 55: CPT | Performed by: INTERNAL MEDICINE

## 2019-02-23 PROCEDURE — 36415 COLL VENOUS BLD VENIPUNCTURE: CPT

## 2019-02-23 RX ORDER — DEXTROSE MONOHYDRATE 50 MG/ML
INJECTION, SOLUTION INTRAVENOUS
Status: DISCONTINUED
Start: 2019-02-23 | End: 2019-02-23

## 2019-02-23 RX ORDER — CALCIUM CARBONATE 200(500)MG
500 TABLET,CHEWABLE ORAL 3 TIMES DAILY PRN
Status: DISCONTINUED | OUTPATIENT
Start: 2019-02-23 | End: 2019-02-24 | Stop reason: HOSPADM

## 2019-02-23 RX ORDER — DEXTROSE MONOHYDRATE 50 MG/ML
INJECTION, SOLUTION INTRAVENOUS CONTINUOUS
Status: DISCONTINUED | OUTPATIENT
Start: 2019-02-23 | End: 2019-02-23

## 2019-02-23 RX ORDER — FAMOTIDINE 20 MG/1
20 TABLET, FILM COATED ORAL DAILY
Status: DISCONTINUED | OUTPATIENT
Start: 2019-02-24 | End: 2019-02-24 | Stop reason: HOSPADM

## 2019-02-23 RX ADMIN — INSULIN LISPRO 12 UNITS: 100 INJECTION, SOLUTION INTRAVENOUS; SUBCUTANEOUS at 18:05

## 2019-02-23 RX ADMIN — MAGNESIUM HYDROXIDE 30 ML: 400 SUSPENSION ORAL at 11:41

## 2019-02-23 RX ADMIN — LEVOTHYROXINE SODIUM 75 MCG: 75 TABLET ORAL at 08:04

## 2019-02-23 RX ADMIN — INSULIN LISPRO 2 UNITS: 100 INJECTION, SOLUTION INTRAVENOUS; SUBCUTANEOUS at 20:47

## 2019-02-23 RX ADMIN — INSULIN GLARGINE 18 UNITS: 100 INJECTION, SOLUTION SUBCUTANEOUS at 11:41

## 2019-02-23 RX ADMIN — POTASSIUM CHLORIDE 10 MEQ: 7.46 INJECTION, SOLUTION INTRAVENOUS at 10:57

## 2019-02-23 RX ADMIN — TRAZODONE HYDROCHLORIDE 50 MG: 50 TABLET ORAL at 20:45

## 2019-02-23 RX ADMIN — PRAVASTATIN SODIUM 80 MG: 80 TABLET ORAL at 20:45

## 2019-02-23 RX ADMIN — MIRTAZAPINE 7.5 MG: 15 TABLET, FILM COATED ORAL at 20:45

## 2019-02-23 RX ADMIN — CITALOPRAM HYDROBROMIDE 10 MG: 10 TABLET ORAL at 08:04

## 2019-02-23 RX ADMIN — MULTIPLE VITAMINS W/ MINERALS TAB 1 TABLET: TAB at 08:04

## 2019-02-23 RX ADMIN — Medication 10 ML: at 20:52

## 2019-02-23 RX ADMIN — LAMOTRIGINE 25 MG: 25 TABLET ORAL at 10:15

## 2019-02-23 RX ADMIN — ENOXAPARIN SODIUM 30 MG: 30 INJECTION SUBCUTANEOUS at 08:03

## 2019-02-23 RX ADMIN — INSULIN GLARGINE 25 UNITS: 100 INJECTION, SOLUTION SUBCUTANEOUS at 20:47

## 2019-02-23 RX ADMIN — DEXTROSE AND SODIUM CHLORIDE: 5; 450 INJECTION, SOLUTION INTRAVENOUS at 02:58

## 2019-02-23 RX ADMIN — CALCIUM CARBONATE 500 MG: 500 TABLET, CHEWABLE ORAL at 23:15

## 2019-02-23 RX ADMIN — INSULIN LISPRO 7 UNITS: 100 INJECTION, SOLUTION INTRAVENOUS; SUBCUTANEOUS at 18:03

## 2019-02-23 RX ADMIN — DEXTROSE MONOHYDRATE: 50 INJECTION, SOLUTION INTRAVENOUS at 10:16

## 2019-02-23 ASSESSMENT — ENCOUNTER SYMPTOMS
COUGH: 0
SHORTNESS OF BREATH: 0
ABDOMINAL PAIN: 0
ABDOMINAL DISTENTION: 0
CHEST TIGHTNESS: 0
VOMITING: 1
WHEEZING: 0

## 2019-02-23 ASSESSMENT — PAIN SCALES - GENERAL
PAINLEVEL_OUTOF10: 0

## 2019-02-24 VITALS
HEART RATE: 58 BPM | HEIGHT: 66 IN | RESPIRATION RATE: 16 BRPM | WEIGHT: 185.41 LBS | TEMPERATURE: 97.6 F | OXYGEN SATURATION: 100 % | SYSTOLIC BLOOD PRESSURE: 170 MMHG | DIASTOLIC BLOOD PRESSURE: 80 MMHG | BODY MASS INDEX: 29.8 KG/M2

## 2019-02-24 LAB
ANION GAP SERPL CALCULATED.3IONS-SCNC: 11 MMOL/L (ref 3–16)
BASOPHILS ABSOLUTE: 0 K/UL (ref 0–0.2)
BASOPHILS RELATIVE PERCENT: 0.6 %
BUN BLDV-MCNC: 19 MG/DL (ref 7–20)
CALCIUM SERPL-MCNC: 9.3 MG/DL (ref 8.3–10.6)
CHLORIDE BLD-SCNC: 108 MMOL/L (ref 99–110)
CO2: 22 MMOL/L (ref 21–32)
CREAT SERPL-MCNC: 1.2 MG/DL (ref 0.6–1.2)
EOSINOPHILS ABSOLUTE: 0.2 K/UL (ref 0–0.6)
EOSINOPHILS RELATIVE PERCENT: 3.8 %
GFR AFRICAN AMERICAN: 52
GFR NON-AFRICAN AMERICAN: 43
GLUCOSE BLD-MCNC: 131 MG/DL (ref 70–99)
GLUCOSE BLD-MCNC: 135 MG/DL (ref 70–99)
GLUCOSE BLD-MCNC: 158 MG/DL (ref 70–99)
GLUCOSE BLD-MCNC: 349 MG/DL (ref 70–99)
HCT VFR BLD CALC: 35.1 % (ref 36–48)
HEMOGLOBIN: 11.7 G/DL (ref 12–16)
LYMPHOCYTES ABSOLUTE: 2.3 K/UL (ref 1–5.1)
LYMPHOCYTES RELATIVE PERCENT: 41.6 %
MCH RBC QN AUTO: 31.3 PG (ref 26–34)
MCHC RBC AUTO-ENTMCNC: 33.3 G/DL (ref 31–36)
MCV RBC AUTO: 93.9 FL (ref 80–100)
MONOCYTES ABSOLUTE: 0.4 K/UL (ref 0–1.3)
MONOCYTES RELATIVE PERCENT: 6.6 %
NEUTROPHILS ABSOLUTE: 2.6 K/UL (ref 1.7–7.7)
NEUTROPHILS RELATIVE PERCENT: 47.4 %
PDW BLD-RTO: 13.7 % (ref 12.4–15.4)
PERFORMED ON: ABNORMAL
PLATELET # BLD: 247 K/UL (ref 135–450)
PMV BLD AUTO: 8.6 FL (ref 5–10.5)
POTASSIUM SERPL-SCNC: 4 MMOL/L (ref 3.5–5.1)
RBC # BLD: 3.74 M/UL (ref 4–5.2)
SODIUM BLD-SCNC: 141 MMOL/L (ref 136–145)
WBC # BLD: 5.5 K/UL (ref 4–11)

## 2019-02-24 PROCEDURE — 6360000002 HC RX W HCPCS: Performed by: STUDENT IN AN ORGANIZED HEALTH CARE EDUCATION/TRAINING PROGRAM

## 2019-02-24 PROCEDURE — 80048 BASIC METABOLIC PNL TOTAL CA: CPT

## 2019-02-24 PROCEDURE — 6370000000 HC RX 637 (ALT 250 FOR IP): Performed by: STUDENT IN AN ORGANIZED HEALTH CARE EDUCATION/TRAINING PROGRAM

## 2019-02-24 PROCEDURE — 36415 COLL VENOUS BLD VENIPUNCTURE: CPT

## 2019-02-24 PROCEDURE — 2580000003 HC RX 258: Performed by: STUDENT IN AN ORGANIZED HEALTH CARE EDUCATION/TRAINING PROGRAM

## 2019-02-24 PROCEDURE — 85025 COMPLETE CBC W/AUTO DIFF WBC: CPT

## 2019-02-24 RX ORDER — LISINOPRIL 20 MG/1
20 TABLET ORAL DAILY
Status: DISCONTINUED | OUTPATIENT
Start: 2019-02-24 | End: 2019-02-24 | Stop reason: HOSPADM

## 2019-02-24 RX ADMIN — LAMOTRIGINE 25 MG: 25 TABLET ORAL at 10:04

## 2019-02-24 RX ADMIN — Medication 10 ML: at 07:55

## 2019-02-24 RX ADMIN — INSULIN LISPRO 3 UNITS: 100 INJECTION, SOLUTION INTRAVENOUS; SUBCUTANEOUS at 08:53

## 2019-02-24 RX ADMIN — CITALOPRAM HYDROBROMIDE 10 MG: 10 TABLET ORAL at 07:55

## 2019-02-24 RX ADMIN — MULTIPLE VITAMINS W/ MINERALS TAB 1 TABLET: TAB at 07:55

## 2019-02-24 RX ADMIN — LISINOPRIL 20 MG: 20 TABLET ORAL at 07:55

## 2019-02-24 RX ADMIN — ENOXAPARIN SODIUM 40 MG: 40 INJECTION SUBCUTANEOUS at 07:54

## 2019-02-24 RX ADMIN — INSULIN LISPRO 12 UNITS: 100 INJECTION, SOLUTION INTRAVENOUS; SUBCUTANEOUS at 13:05

## 2019-02-24 RX ADMIN — LEVOTHYROXINE SODIUM 75 MCG: 75 TABLET ORAL at 07:55

## 2019-02-24 RX ADMIN — FAMOTIDINE 20 MG: 20 TABLET, FILM COATED ORAL at 07:55

## 2019-02-24 ASSESSMENT — PAIN SCALES - GENERAL
PAINLEVEL_OUTOF10: 0

## 2019-02-25 ENCOUNTER — CARE COORDINATION (OUTPATIENT)
Dept: CARE COORDINATION | Age: 79
End: 2019-02-25

## 2019-02-25 ENCOUNTER — CARE COORDINATION (OUTPATIENT)
Dept: CASE MANAGEMENT | Age: 79
End: 2019-02-25

## 2019-02-25 ENCOUNTER — TELEPHONE (OUTPATIENT)
Dept: INTERNAL MEDICINE CLINIC | Age: 79
End: 2019-02-25

## 2019-02-25 NOTE — CARE COORDINATION
[facility] Trans[facility] Initial Follow Up Call    Call within 2 business days of discharge: Yes    Patient:  Christin Reyna   Patient :  1940  MRN:  [id number]    Reason for Admission:  DKA  Discharge Date:  19   RARS:  Martha      1ST CTC attempt to reach Pt regarding recent hospital discharge. CTC left voice recording with call back number requesting a call back.     Follow up appointments:    Future Appointments  Date Time Provider Sophy Pereira   2019 2:15 PM MD [facility]   3/4/2019 2:15 PM MD [facility]       Thank Tristan Pitt RN  Care Transition Coordinator  Contact [facility]:[phone]

## 2019-02-26 ENCOUNTER — TELEPHONE (OUTPATIENT)
Dept: INTERNAL MEDICINE CLINIC | Age: 79
End: 2019-02-26

## 2019-02-27 ENCOUNTER — CARE COORDINATION (OUTPATIENT)
Dept: CARE COORDINATION | Age: 79
End: 2019-02-27

## 2019-02-27 ENCOUNTER — CARE COORDINATION (OUTPATIENT)
Dept: CASE MANAGEMENT | Age: 79
End: 2019-02-27

## 2019-02-28 ENCOUNTER — TELEPHONE (OUTPATIENT)
Dept: INTERNAL MEDICINE CLINIC | Age: 79
End: 2019-02-28

## 2019-03-01 ENCOUNTER — OFFICE VISIT (OUTPATIENT)
Dept: INTERNAL MEDICINE CLINIC | Age: 79
End: 2019-03-01
Payer: MEDICARE

## 2019-03-01 DIAGNOSIS — E11.9 TYPE 2 DIABETES MELLITUS TREATED WITH INSULIN (HCC): Primary | ICD-10-CM

## 2019-03-01 DIAGNOSIS — I10 ESSENTIAL HYPERTENSION: ICD-10-CM

## 2019-03-01 DIAGNOSIS — R35.0 URINARY FREQUENCY: ICD-10-CM

## 2019-03-01 DIAGNOSIS — Z79.4 TYPE 2 DIABETES MELLITUS TREATED WITH INSULIN (HCC): Primary | ICD-10-CM

## 2019-03-01 LAB
BILIRUBIN, POC: NORMAL
BLOOD URINE, POC: NORMAL
CLARITY, POC: CLEAR
COLOR, POC: NORMAL
GLUCOSE URINE, POC: 2000
KETONES, POC: NORMAL
LEUKOCYTE EST, POC: NORMAL
NITRITE, POC: NORMAL
PH, POC: 6
PROTEIN, POC: NORMAL
SPECIFIC GRAVITY, POC: 1
UROBILINOGEN, POC: NORMAL

## 2019-03-01 PROCEDURE — 1111F DSCHRG MED/CURRENT MED MERGE: CPT | Performed by: NURSE PRACTITIONER

## 2019-03-01 PROCEDURE — 81002 URINALYSIS NONAUTO W/O SCOPE: CPT | Performed by: NURSE PRACTITIONER

## 2019-03-01 PROCEDURE — 99215 OFFICE O/P EST HI 40 MIN: CPT | Performed by: NURSE PRACTITIONER

## 2019-03-01 RX ORDER — LAMOTRIGINE 25 MG/1
25 TABLET ORAL DAILY
Qty: 90 TABLET | Refills: 0 | Status: SHIPPED | OUTPATIENT
Start: 2019-03-01 | End: 2019-06-06 | Stop reason: SDUPTHER

## 2019-03-02 VITALS
BODY MASS INDEX: 28.89 KG/M2 | WEIGHT: 179 LBS | DIASTOLIC BLOOD PRESSURE: 88 MMHG | OXYGEN SATURATION: 97 % | HEART RATE: 73 BPM | SYSTOLIC BLOOD PRESSURE: 138 MMHG

## 2019-03-02 ASSESSMENT — ENCOUNTER SYMPTOMS
COLOR CHANGE: 0
NAUSEA: 0
SINUS PRESSURE: 0
CHEST TIGHTNESS: 0
COUGH: 0
EYE ITCHING: 0
RHINORRHEA: 0
BLOOD IN STOOL: 0
SHORTNESS OF BREATH: 0
VOMITING: 0
DIARRHEA: 0
BACK PAIN: 0
EYE REDNESS: 0
SORE THROAT: 0
CONSTIPATION: 0
ABDOMINAL PAIN: 0
WHEEZING: 0

## 2019-03-02 ASSESSMENT — PATIENT HEALTH QUESTIONNAIRE - PHQ9
SUM OF ALL RESPONSES TO PHQ QUESTIONS 1-9: 0
2. FEELING DOWN, DEPRESSED OR HOPELESS: 0
SUM OF ALL RESPONSES TO PHQ QUESTIONS 1-9: 0
SUM OF ALL RESPONSES TO PHQ9 QUESTIONS 1 & 2: 0
1. LITTLE INTEREST OR PLEASURE IN DOING THINGS: 0

## 2019-03-04 ENCOUNTER — TELEPHONE (OUTPATIENT)
Dept: INTERNAL MEDICINE CLINIC | Age: 79
End: 2019-03-04

## 2019-03-11 RX ORDER — TRAZODONE HYDROCHLORIDE 50 MG/1
TABLET ORAL
Qty: 90 TABLET | Refills: 0 | Status: SHIPPED | OUTPATIENT
Start: 2019-03-11 | End: 2019-06-13 | Stop reason: SDUPTHER

## 2019-03-13 ENCOUNTER — TELEPHONE (OUTPATIENT)
Dept: INTERNAL MEDICINE CLINIC | Age: 79
End: 2019-03-13

## 2019-03-14 ENCOUNTER — CARE COORDINATION (OUTPATIENT)
Dept: CARE COORDINATION | Age: 79
End: 2019-03-14

## 2019-03-18 RX ORDER — LEVOTHYROXINE SODIUM 0.07 MG/1
75 TABLET ORAL DAILY
Qty: 30 TABLET | Refills: 0 | Status: SHIPPED | OUTPATIENT
Start: 2019-03-18 | End: 2019-04-16 | Stop reason: SDUPTHER

## 2019-03-20 ENCOUNTER — TELEPHONE (OUTPATIENT)
Dept: INTERNAL MEDICINE CLINIC | Age: 79
End: 2019-03-20

## 2019-03-26 ENCOUNTER — TELEPHONE (OUTPATIENT)
Dept: INTERNAL MEDICINE CLINIC | Age: 79
End: 2019-03-26

## 2019-03-29 ENCOUNTER — TELEPHONE (OUTPATIENT)
Dept: INTERNAL MEDICINE CLINIC | Age: 79
End: 2019-03-29

## 2019-03-29 RX ORDER — BLOOD-GLUCOSE METER
EACH MISCELLANEOUS
Qty: 1 KIT | Refills: 0 | Status: SHIPPED | OUTPATIENT
Start: 2019-03-29 | End: 2020-11-13 | Stop reason: CLARIF

## 2019-04-01 ENCOUNTER — CARE COORDINATION (OUTPATIENT)
Dept: CARE COORDINATION | Age: 79
End: 2019-04-01

## 2019-04-01 ENCOUNTER — OFFICE VISIT (OUTPATIENT)
Dept: INTERNAL MEDICINE CLINIC | Age: 79
End: 2019-04-01
Payer: MEDICARE

## 2019-04-01 VITALS
HEIGHT: 68 IN | DIASTOLIC BLOOD PRESSURE: 84 MMHG | OXYGEN SATURATION: 93 % | HEART RATE: 86 BPM | BODY MASS INDEX: 28.34 KG/M2 | SYSTOLIC BLOOD PRESSURE: 138 MMHG | WEIGHT: 187 LBS

## 2019-04-01 DIAGNOSIS — I10 ESSENTIAL HYPERTENSION: ICD-10-CM

## 2019-04-01 DIAGNOSIS — E03.4 HYPOTHYROIDISM DUE TO ACQUIRED ATROPHY OF THYROID: ICD-10-CM

## 2019-04-01 DIAGNOSIS — Z79.4 TYPE 2 DIABETES MELLITUS TREATED WITH INSULIN (HCC): ICD-10-CM

## 2019-04-01 DIAGNOSIS — R21 RASH: ICD-10-CM

## 2019-04-01 DIAGNOSIS — E11.9 TYPE 2 DIABETES MELLITUS TREATED WITH INSULIN (HCC): ICD-10-CM

## 2019-04-01 PROBLEM — E11.10 DKA, TYPE 2, NOT AT GOAL (HCC): Status: RESOLVED | Noted: 2019-02-22 | Resolved: 2019-04-01

## 2019-04-01 LAB — HBA1C MFR BLD: 10.2 %

## 2019-04-01 PROCEDURE — 1090F PRES/ABSN URINE INCON ASSESS: CPT | Performed by: INTERNAL MEDICINE

## 2019-04-01 PROCEDURE — 99214 OFFICE O/P EST MOD 30 MIN: CPT | Performed by: INTERNAL MEDICINE

## 2019-04-01 PROCEDURE — G8400 PT W/DXA NO RESULTS DOC: HCPCS | Performed by: INTERNAL MEDICINE

## 2019-04-01 PROCEDURE — 1123F ACP DISCUSS/DSCN MKR DOCD: CPT | Performed by: INTERNAL MEDICINE

## 2019-04-01 PROCEDURE — 4040F PNEUMOC VAC/ADMIN/RCVD: CPT | Performed by: INTERNAL MEDICINE

## 2019-04-01 PROCEDURE — G8427 DOCREV CUR MEDS BY ELIG CLIN: HCPCS | Performed by: INTERNAL MEDICINE

## 2019-04-01 PROCEDURE — 1036F TOBACCO NON-USER: CPT | Performed by: INTERNAL MEDICINE

## 2019-04-01 PROCEDURE — G8417 CALC BMI ABV UP PARAM F/U: HCPCS | Performed by: INTERNAL MEDICINE

## 2019-04-01 PROCEDURE — 83036 HEMOGLOBIN GLYCOSYLATED A1C: CPT | Performed by: INTERNAL MEDICINE

## 2019-04-01 RX ORDER — BLOOD SUGAR DIAGNOSTIC
STRIP MISCELLANEOUS
Qty: 200 STRIP | Refills: 0 | Status: SHIPPED | OUTPATIENT
Start: 2019-04-01 | End: 2020-11-13 | Stop reason: CLARIF

## 2019-04-01 RX ORDER — MOMETASONE FUROATE 1 MG/G
CREAM TOPICAL
Qty: 15 G | Refills: 0 | Status: SHIPPED | OUTPATIENT
Start: 2019-04-01 | End: 2020-11-13 | Stop reason: CLARIF

## 2019-04-01 ASSESSMENT — ENCOUNTER SYMPTOMS
SHORTNESS OF BREATH: 0
CHEST TIGHTNESS: 0
WHEEZING: 0
GASTROINTESTINAL NEGATIVE: 1
RESPIRATORY NEGATIVE: 1

## 2019-04-01 ASSESSMENT — PATIENT HEALTH QUESTIONNAIRE - PHQ9
1. LITTLE INTEREST OR PLEASURE IN DOING THINGS: 0
SUM OF ALL RESPONSES TO PHQ9 QUESTIONS 1 & 2: 0
SUM OF ALL RESPONSES TO PHQ QUESTIONS 1-9: 0
SUM OF ALL RESPONSES TO PHQ QUESTIONS 1-9: 0
2. FEELING DOWN, DEPRESSED OR HOPELESS: 0

## 2019-04-01 NOTE — CARE COORDINATION
Sherri Yeny  April 1, 2019    Initial Referral Reason: DM MNT    Patient Care Team:  Balta Barajas MD as PCP - General (Internal Medicine)  Balta Barajas MD as PCP - [facility] Attributed Provider  Joseph Darling RN as Care Coordinator  Adriana Bearden MD as Surgeon (General Surgery)  Donald Sherwood RD, LD as Dietitian    Patient Active Problem List   Diagnosis    Hypertension    Hyperlipidemia    Hypothyroid    Depression    Low back pain    DDD (degenerative disc disease), lumbosacral    Stenosis, spinal, lumbar    Almonte's esophagus without dysplasia    Urinary frequency    Type 2 diabetes mellitus treated with insulin ([address]    Major depressive disorder, recurrent, moderate (HCC)    Rash       Current Outpatient Medications   Medication Sig Dispense Refill    mometasone (ELOCON) 0.1 % cream Apply topically daily. 15 g 0    Blood Glucose Monitoring Suppl (EASY STEP GLUCOSE MONITOR) w/Device KIT Patient testing 2-3 times daily per E11.9   Patient also needs test strips and lancets.  1 kit 0    levothyroxine (SYNTHROID) 75 MCG tablet TAKE 1 TABLET BY MOUTH DAILY 30 tablet 0    insulin glargine (LANTUS) 100 UNIT/ML injection pen Inject 20 Units into the skin 2 times daily 5 pen 1    traZODone (DESYREL) 50 MG tablet TAKE 1 TABLET BY MOUTH DAILY 90 tablet 0    lamoTRIgine (LAMICTAL) 25 MG tablet TAKE 1 TABLET BY MOUTH DAILY 90 tablet 0    insulin lispro (HUMALOG) 100 UNIT/ML pen Inject 0-18 Units into the skin 3 times daily (with meals) 5 pen 1    insulin lispro (HUMALOG) 100 UNIT/ML pen Inject 0-9 Units into the skin nightly 5 pen 1    insulin lispro (HUMALOG) 100 UNIT/ML pen Inject 10 Units into the skin 3 times daily (with meals) 5 pen 1    pravastatin (PRAVACHOL) 80 MG tablet Take 80 mg by mouth nightly      mirtazapine (REMERON) 15 MG tablet TAKE 1/2 TABLET BY MOUTH EVERY NIGHT AT BEDTIME 45 tablet 0    lisinopril (PRINIVIL;ZESTRIL) 20 MG tablet TAKE 1 TABLET BY MOUTH DAILY 90 tablet 0    omeprazole (PRILOSEC) 40 MG delayed release capsule Take 1 capsule by mouth daily 90 capsule 1    diphenhydrAMINE (BENADRYL) 25 MG capsule Take 25 mg by mouth every 6 hours as needed for Itching      citalopram (CELEXA) 10 MG tablet Take 1 tablet by mouth daily 30 tablet 3    Multiple Vitamins-Minerals (THERAPEUTIC MULTIVITAMIN-MINERALS) tablet Take 1 tablet by mouth daily. No current facility-administered medications for this visit. Nutrition Assessment    Biochemical Data, Medical Tests and Procedures:    Lab Results   Component Value Date    LABA1C 10.2 04/01/2019     Lab Results   Component Value Date    .1 01/28/2019       Lab Results   Component Value Date    CHOL 179 08/13/2018    CHOL 178 11/18/2015    CHOL 203 (H) 12/31/2013     Lab Results   Component Value Date    TRIG 195 (H) 08/13/2018    TRIG 166 (H) 11/18/2015    TRIG 97 12/31/2013     Lab Results   Component Value Date    HDL 42 08/13/2018    HDL 38 (L) 11/18/2015    HDL 44 12/31/2013     Lab Results   Component Value Date    LDLCALC 98 08/13/2018    LDLCALC 107 (H) 11/18/2015    LDLCALC 140 (H) 12/31/2013     Lab Results   Component Value Date    LABVLDL 39 08/13/2018    LABVLDL 33 11/18/2015    LABVLDL 19 12/31/2013     No results found for: [facility]    Lab Results   Component Value Date    WBC 5.5 02/24/2019    HGB 11.7 (L) 02/24/2019    HCT 35.1 (L) 02/24/2019    MCV 93.9 02/24/2019     02/24/2019       Lab Results   Component Value Date    CREATININE 1.2 02/24/2019    BUN 19 02/24/2019     02/24/2019    K 4.0 02/24/2019     02/24/2019    CO2 22 02/24/2019         Anthropometric Measurements:    Height: 5'6\" (66 in.)  Weight: 179 lbs. (81 kg)  BMI: 29  IBW: 59.3 kg (130 lbs. )  %IBW: 37%  AdBW: 68.1 kg (150 lbs. )    Physical Exam Findings:    Deferred    Food and Nutrition History:    24-Hour Diet Recall    Breakfast  Time: 8:30 am   Consumed: eggs or oatmeal (almond milk, one package)    AM Snack  Consumed: none    Lunch  Time: 12:30/1pm  Consumed: grilled cheese/tomato soup, will eat in dining area or make herself    PM Snack  Consumed: none    Dinner  Time: 6:00 pm  Consumed: (dinner last night) spinach salad, chicken breast, sweet potatoes, green beans, will always eat in dining room    Bedtime Snack  Consumed: PB crackers, cheese with sakina crackers, sandwich    Beverages: crystal light, water, diet coke    Frequency of meals away from home: 2x weekly    Exercise: none    Blood Sugar Checks: 4x daily, fastin's mg/dL, 66 mg/dL yesterday morning, lunch: 80-90's, dinner: 150 mg/dL  *Humalog before dinner: 10 U  *Lantus: 20 U  *reviewed hypoglycemia and rule of 15    Summary of Appointment: Patient was seen today for diabetes education session. Patient sister and [facility] were also in attendance. We discussed patient's current intake and eating habits (listed above in food recall). Patient resides in an [facility] center where she has access to three meals daily in their dining room. Patient stated that she will always make breakfast herself. She stated that the meals at the center are high in [facility] and she tries to stay away from those options. She stated that they don't have the ability to make meal plans personalized for DM. RD will check on this and contact dining services to see if they will be able to put patient on DM meals. Patient appreciative. Education was provided on healthy eating with diabetes, which included nutrition label reading, carbohydrate counting, foods that contain carbohydrates, and the amount of carbohydrates in certain foods. Appropriate snack choices were also discussed. We discussed in depth the importance of label reading and a tutorial was given with examples. Encouraged patient to start using the MyPlate method for proper control of servings at meal/snack time. All patient's questions, comments, and concerns were addressed and answered.  Patient verbalized understanding of all information that was discussed. Educational Resources Provided: MyPlate guidelines, Nutrition Labeling, 20 ways to enjoy more fruits and vegetables    Nutrition Diagnosis    #1 Problem Altered nutrition-related lab values: A1c       Etiology related to endocrine dysfunction       Signs/Symptoms as evidenced by 10.2% (4/1/19)    Nutrition Intervention    Nutrition Prescription:    diabetic diet providing 1,179-1,310 kcals to promote wt maintenance ([place]). Estimated daily CHO Needs: 135 g (based on 45-65% total calorie intake)  Estimated daily Protein Needs: 65 g (based on 0.8 g/kg)  Estimated daily Fluid Needs: 2,025 mL based on 25 mL/kg (67 oz.)    Intervention #1    Nutrition Counseling: Used open-ended questions to assess patients perceived susceptibility and severity of disease state. Discussed potential impact of health threat on patient's lifestyle. Used open-ended questions to assess patient's perception regarding benefits of and barriers to implementation of nutrition therapy. Goal(s): Patient will apply constructs discussed during nutrition counseling to initiate and encourage behavior change to positively affect disease state/nutrition status. Intervention #2    Nutrition Education: Clearly defined the benefits of nutrition therapy. Summarized and affirmed positive aspects of current nutrition patterns. Provided education regarding value of adherence to controlled CHO intake. Discussed ways to establish application of Plate Method meal planning efforts and discussed concepts of alternatives and choices regarding implementation of consistent CHO diet. Explored ideas for small, incremental goals to initiate behavior change. Goal(s) Patient will apply education to define small goals that will help to implement consistent CHO nutrition therapy.      Nutrition Monitoring and Evaluation    Indicator/Goal Criteria   #1 Meal Patterns #1 Continue to consume three meals daily and with a hs snack   #2 MyPlate #2 Follow MyPlate guidelines for proper portion control       Follow Up: Will call in two weeks.       Sreekanth Bedoya RDN, LD, CDE  Care Coordination Team Dietitian  [phone]

## 2019-04-01 NOTE — PATIENT INSTRUCTIONS
Patient Self-Management Goal for Chronic Condition  Goal: I will take all medications as prescribed by my doctor, and I will call the office if I am having any medication problems. Barriers to success: none  Plan for overcoming my barriers: N/A     Confidence: 9/10  Date goal set: 04/1/2019  Date goal attained:     Self- Management Goals for the Patient with High Cholesterol: It is important to have goals to work towards when you have High Cholesterol. Below is a list of goals your doctor would like you to work towards to help control your hyperlipidemia and also maintain and improve your overall health. Please select one of these goals to try before your next follow up visit:    Goal: I will take all my medications as prescribed, and call the office with any problems. Guess your barriers before they happen. Everyone runs into barriers to their goals. You may already know what's going to get in your way. Write down these problems (cost? time? stress? fear?), and think of ways to get around them. Barriers to success: none  Plan for overcoming my barriers: N/A     Confidence: 10/10  Date goal set: 04/1/2019  Date goal attained:      BP Readings from Last 2 Encounters:   03/31/17 (!) 160/84   03/16/17 122/80     Hemoglobin A1C (%)   Date Value   07/15/2016 12.2     Microscopic Examination (no units)   Date Value   03/31/2017 YES     LDL Calculated (mg/dL)   Date Value   11/18/2015 107 (H)              Tobacco use:  Patient  reports that she has never smoked. She has never used smokeless tobacco.    If Smoker - Cessation materials given? No    Is Daily aspirin on medication list?:  Yes    Diabetic retinal exam done? Yes   If yes, document in Health Maintenance. Monofilament placed on counter? No    Shoes and socks removed? No    BP taken with correct size cuff?  Yes   Repeated if > 140/90 Yes     Is patient taking any medications for diabetes    Yes   If yes, see medication list    Is the patient reporting any side effects of diabetic medications? No    Microalbumin performed if applicable?   No      Is patient taking any over the counter medications    Yes   If yes, see medication list    Will notify Dr. Garfield Lion if there are changes 04/01/2019

## 2019-04-01 NOTE — CARE COORDINATION
RD outreach to [facility] available for diabetics. LM and request for call back to continue introduction. Awaiting return call.

## 2019-04-01 NOTE — CARE COORDINATION
Ambulatory Care Coordination Note  4/1/2019  CM Risk Score: 5  Silas Mortality Risk Score: 7    ACC: George Ferrer RN    Summary Note: joint session mtg w/pt, pt's sister KENNEDI Bennett [address], & RD CDE. Reviewed self mgmt concepts as r/t DM mgmt. Plan: ongoing collaboration between pt & RD, CDE in review of pt self mgmt, barriers r/t meal options @ ALU & outcomes. next outreach not before 2-4 weeks, to allow pt & CDE opportunity for review. General Assessment    Do you have any symptoms that are causing concern?:  No       Diabetes Assessment    Medic Alert ID:  Yes  Meal Planning:  Avoidance of concentrated sweets, Carb counting, Plate Method   How often do you test your blood sugar?:  Daily, Meals, Bedtime (Comment: nurse @ ALU assists pt with DM mgmt; administers mealtime & bedtime insulins)   Do you have barriers with adherence to non-pharmacologic self-management interventions? (Nutrition/Exercise/Self-Monitoring):  No   Have you ever had to go to the ED for symptoms of low blood sugar?:  No   What is the date of your last ED visit for low blood sugar?:  8/20/16       No patient-reported symptoms   Do you have hyperglycemia symptoms?:  No   Do you have hypoglycemia symptoms?:  No   Blood Sugar Monitoring Regimen:  Morning Fasting, Before Meals, At Bedtime   Blood Sugar Trends:  Fluctuating (Comment: 66-70s in am fasting; 80-90s ac lunchtime; 150s ac dinnertime)          Care Coordination Interventions    Program Enrollment:  Complex Care  Referral from Primary Care Provider:  No  Suggested Interventions and [facility] [address]:  Completed (Comment: Dr. Pineda Gooden at [facility] )  Diabetes Education:  Completed  Fall Risk Prevention:  Completed (Comment: 5/21/18 @ 0100 ALU; early Aug 2017 fall on attempt OOB @ night NO INJURY)  Home Health Services:  Completed (Comment: [facility])  Medi Set or Pill Pack:  Completed  Registered Dietician:   In Process (Comment: referral to Kassie Dahl RD for support w/nutrition and/or barriers to healthy options)  Specialty Services Referral:  Completed (Comment: Dr Simón Saeed (gen surgeon - last seen 10/30/17 s/p lap cholecystectomy - Dr Simón Saeed stated prn basis going forward); sees Shaggy Dominguez (therapist @ [facility]))  Transportation Support:  Completed  Zone Management Tools:  Completed (Comment: DM; review of Right Time. Right Care. Right Place handout)  Other Services or Interventions:  review of progress towards goals, barriers.  [facility] [phone] AL DON [facility] [place] [facility]         Goals Addressed                 This Visit's Progress     Care Coordination Self Management (pt-stated)   On track     CC Self Management Goal  Patient Goal (What steps will patient take to achieve goal?): A1c <7.0; avoid acute onset episodes r/t poorly controlled DM mgmt  Patient is able to discuss self-management of condition(s):  Pt demonstrates adherence to medications  Pt demonstrates understanding of self-monitoring  Patient is able to identify Red Flags:  Alert to potential adverse drug reactions(s) or side effects and actions to take should they arise  Discuss target symptoms and actions to take should they arise  Identify problems that require immediate PCP or specialist visit  Patient demonstrates understanding of access to PCP/Specialist:  Understands about scheduling routine Follow Up appointments   Understands about sick day appointment options for worsening of symptoms/progression (Same Day, E Visits)       Exercise daily (30 min per time)   Not on track               walking       Nutrition Plan (pt-stated)   On track     I will follow a carb-controlled diet    Barriers: fear of failure, lack of motivation, physical: dental problems and menu options @ ALU  Plan for overcoming my barriers: eat low starchy carbohydrates/informed decisions @ ALU dining; dental care; outreach to [address] as needed; consider added support of RD  Confidence: no numeric value assigned/10  Anticipated Goal Completion Date: 4/30/19       Self Monitoring   On track       I will record the BG readings and insulin administered, log my meals and bring this to appt with PCP    Barriers: none  Plan for overcoming my barriers: N/A  Confidence: 8/10  Anticipated Goal Completion Date: 3/4/19              Prior to Admission medications    Medication Sig Start Date End Date Taking? Authorizing Provider   mometasone (ELOCON) 0.1 % cream Apply topically daily. 4/1/19   Afua Valverde MD   Blood Glucose Monitoring Suppl (EASY STEP GLUCOSE MONITOR) w/Device KIT Patient testing 2-3 times daily per E11.9   Patient also needs test strips and lancets.  3/29/19   Afua Valverde MD   levothyroxine (SYNTHROID) 75 MCG tablet TAKE 1 TABLET BY MOUTH DAILY 3/18/19   Afua Valverde MD   insulin glargine (LANTUS) 100 UNIT/ML injection pen Inject 20 Units into the skin 2 times daily 3/13/19   Afua Valverde MD   traZODone (DESYREL) 50 MG tablet TAKE 1 TABLET BY MOUTH DAILY 3/11/19   Afua Valverde MD   lamoTRIgine (LAMICTAL) 25 MG tablet TAKE 1 TABLET BY MOUTH DAILY 3/1/19   Afua Valverde MD   insulin lispro (HUMALOG) 100 UNIT/ML pen Inject 0-18 Units into the skin 3 times daily (with meals) 2/24/19   Fredrick Giang MD   insulin lispro (HUMALOG) 100 UNIT/ML pen Inject 0-9 Units into the skin nightly 2/24/19   Fredrick Giang MD   insulin lispro (HUMALOG) 100 UNIT/ML pen Inject 10 Units into the skin 3 times daily (with meals) 2/24/19   Fredrick Giang MD   pravastatin (PRAVACHOL) 80 MG tablet Take 80 mg by mouth nightly    Historical Provider, MD   mirtazapine (REMERON) 15 MG tablet TAKE 1/2 TABLET BY MOUTH EVERY NIGHT AT BEDTIME 1/8/19   Afua Valverde MD   lisinopril (PRINIVIL;ZESTRIL) 20 MG tablet TAKE 1 TABLET BY MOUTH DAILY 12/24/18   Afua Valverde MD   omeprazole (PRILOSEC) 40 MG delayed release capsule Take 1 capsule by mouth daily 10/30/18   Afua Valverde MD   diphenhydrAMINE (BENADRYL) 25 MG capsule Take 25 mg by mouth every 6 hours as needed for Itching    Historical Provider, MD   citalopram (CELEXA) 10 MG tablet Take 1 tablet by mouth daily 11/10/17   Geeta Markham APRN - CNP   Multiple Vitamins-Minerals (THERAPEUTIC MULTIVITAMIN-MINERALS) tablet Take 1 tablet by mouth daily.     Historical Provider, MD       Future Appointments   Date Time Provider Sophy Kelli   7/8/2019 11:15 AM Dominic Foreman MD [facility]

## 2019-04-01 NOTE — LETTER
[facility] [address]  [address] [facility] [employer] [address]  Phone: [phone]  Fax: [phone]    Milla Espinoza MD        April 1, 2019    [address]      Dear nursing    If patient's nighttime blood sugar is < 100, decrease Lantus to 10 units    If you have any questions or concerns, please don't hesitate to call.     Sincerely,          Milla Espinoza MD

## 2019-04-01 NOTE — PROGRESS NOTES
Patient Self-Management Goal for Chronic Condition  Goal: I will take all medications as prescribed by my doctor, and I will call the office if I am having any medication problems. Barriers to success: none  Plan for overcoming my barriers: N/A     Confidence: 9/10  Date goal set: 04/1/2019  Date goal attained:     Self- Management Goals for the Patient with High Cholesterol: It is important to have goals to work towards when you have High Cholesterol. Below is a list of goals your doctor would like you to work towards to help control your hyperlipidemia and also maintain and improve your overall health. Please select one of these goals to try before your next follow up visit:    Goal: I will take all my medications as prescribed, and call the office with any problems. Guess your barriers before they happen. Everyone runs into barriers to their goals. You may already know what's going to get in your way. Write down these problems (cost? time? stress? fear?), and think of ways to get around them. Barriers to success: none  Plan for overcoming my barriers: N/A     Confidence: 10/10  Date goal set: 04/1/2019  Date goal attained:      BP Readings from Last 2 Encounters:   03/31/17 (!) 160/84   03/16/17 122/80     Hemoglobin A1C (%)   Date Value   07/15/2016 12.2     Microscopic Examination (no units)   Date Value   03/31/2017 YES     LDL Calculated (mg/dL)   Date Value   11/18/2015 107 (H)              Tobacco use:  Patient  reports that she has never smoked. She has never used smokeless tobacco.    If Smoker - Cessation materials given? No    Is Daily aspirin on medication list?:  Yes    Diabetic retinal exam done? Yes   If yes, document in Health Maintenance. Monofilament placed on counter? No    Shoes and socks removed? No    BP taken with correct size cuff?  Yes   Repeated if > 140/90 Yes     Is patient taking any medications for diabetes    Yes   If yes, see medication list    Is the patient reporting any side effects of diabetic medications? No    Microalbumin performed if applicable?   No      Is patient taking any over the counter medications    Yes   If yes, see medication list    Will notify Dr. Abram Pete if there are changes 04/01/2019

## 2019-04-01 NOTE — PROGRESS NOTES
Subjective:      Patient ID: Sanaz Briones is a 66 y.o. y.o. female. Chief Complaint   Patient presents with    Hyperlipidemia    Hypertension    Diabetes       HPI    Patient is here for a recheck  She is feeling well. Her sugars are averaging around 100. She is taking Lantus bid and Humalog tid. She is feeing better, she is walking better. Patient is taking blood pressure medication without problem  Patient is taking their cholesterol medication and watching diet without problem. Patient is taking their thyroid medication without problem      Vitals:    04/01/19 1015   BP: 138/84   Pulse: 86   SpO2: 93%       Wt Readings from Last 3 Encounters:   04/01/19 187 lb (84.8 kg)   03/01/19 179 lb (81.2 kg)   02/24/19 185 lb 6.5 oz (84.1 kg)            Discussed use, benefit, and side effects of prescribed medications. Barriers to medication compliance addressed. All patient questions answered. Pt voiced understanding. Review of Systems   Constitutional: Negative. HENT: Negative. Respiratory: Negative. Negative for chest tightness, shortness of breath and wheezing. Cardiovascular: Negative. Negative for chest pain, palpitations and leg swelling. Gastrointestinal: Negative. Genitourinary: Negative. Musculoskeletal: Negative for arthralgias and myalgias. Skin:        Rash on bilateral shins  She changed detergents   Neurological: Negative. Objective:   Physical Exam   Constitutional: She appears well-developed and well-nourished. HENT:   Head: Normocephalic and atraumatic. Neck: Carotid bruit is not present. No thyromegaly present. Cardiovascular: Normal rate, regular rhythm, S1 normal, S2 normal and normal heart sounds. Pulmonary/Chest: Effort normal and breath sounds normal. She has no wheezes. She has no rhonchi. She has no rales. Musculoskeletal: She exhibits no edema. Skin: Rash noted.    Erythematous areas on both shins       Assessment:      See Problem

## 2019-04-02 RX ORDER — LISINOPRIL 20 MG/1
TABLET ORAL
Qty: 90 TABLET | Refills: 0 | Status: SHIPPED | OUTPATIENT
Start: 2019-04-02 | End: 2019-07-08 | Stop reason: SDUPTHER

## 2019-04-02 NOTE — TELEPHONE ENCOUNTER
David Hannah,     From what I got, it was the same dining cage? I would need to call for clarification.  If there was a different cage for her then yes, she isn't utilizing AL dining cage.    -Neil Velez

## 2019-04-02 NOTE — CARE COORDINATION
[facility] dietary department, Mahesh Kaur, returned call to RD. Patient is currently in [address] [place] [address] dining cage where they only have the option to choose between the two entrees at meal times. They do not have DM meals unless she is in the other dining cage. Menus are only available at the time of the meal times. Reamanfred Vincent stated that they will always have 2 salad choices, 2 vegetables (green beans always available), 2 starch (mashed potatoes are always available), and 2 entree choices. They also have sugar free deserts upon request. [facility] does have a clinical dietitian who is on site once weekly. Patient is able to make appointment with her for free of charge. Simran Fisher did state that since she is part of that particular dining cage, she will have to know the basics regarding diabetes and what food options she should choose. RD appreciative of return call. Will notify [address] of update.

## 2019-04-15 ENCOUNTER — CARE COORDINATION (OUTPATIENT)
Dept: CARE COORDINATION | Age: 79
End: 2019-04-15

## 2019-04-15 NOTE — CARE COORDINATION
RD outreach to f/u with patient from appointment two weeks ago. LM and request for call back. Will f/u with patient when patient returns call. Will notify [facility] of outreach.

## 2019-04-16 RX ORDER — LEVOTHYROXINE SODIUM 0.07 MG/1
75 TABLET ORAL DAILY
Qty: 30 TABLET | Refills: 0 | Status: SHIPPED | OUTPATIENT
Start: 2019-04-16 | End: 2019-05-18 | Stop reason: SDUPTHER

## 2019-04-16 RX ORDER — MIRTAZAPINE 15 MG/1
TABLET, FILM COATED ORAL
Qty: 45 TABLET | Refills: 0 | Status: SHIPPED | OUTPATIENT
Start: 2019-04-16 | End: 2019-07-08 | Stop reason: SDUPTHER

## 2019-04-17 ENCOUNTER — TELEPHONE (OUTPATIENT)
Dept: INTERNAL MEDICINE CLINIC | Age: 79
End: 2019-04-17

## 2019-04-17 NOTE — TELEPHONE ENCOUNTER
8 am Lantus insulin= 20 units  8 am BS-201. 10 units humalog baseline + 6 units humalog SS    Lunchtime- patient out of facility    Suppertime- BS-135. Refused any humalog    .  Lantus 20 units    [facility] [phone]  Fax [phone]

## 2019-05-06 ENCOUNTER — CARE COORDINATION (OUTPATIENT)
Dept: CARE COORDINATION | Age: 79
End: 2019-05-06

## 2019-05-06 RX ORDER — OMEPRAZOLE 40 MG/1
40 CAPSULE, DELAYED RELEASE ORAL DAILY
Qty: 90 CAPSULE | Refills: 0 | Status: SHIPPED | OUTPATIENT
Start: 2019-05-06 | End: 2019-08-11 | Stop reason: SDUPTHER

## 2019-05-06 NOTE — CARE COORDINATION
Diabetes Assessment    Medic Alert ID:  Yes  Meal Planning:  Avoidance of concentrated sweets, Carb counting, Plate Method   How often do you test your blood sugar?:  Daily, Meals, Bedtime (Comment: nurse @ [facility]U assists pt with DM mgmt; administers mealtime & bedtime insulins)   Do you have barriers with adherence to non-pharmacologic self-management interventions?  (Nutrition/Exercise/Self-Monitoring):  No   Have you ever had to go to the ED for symptoms of low blood sugar?:  No   What is the date of your last ED visit for low blood sugar?:  8/20/16       No patient-reported symptoms   Do you have hyperglycemia symptoms?:  No   Do you have hypoglycemia symptoms?:  No   Blood Sugar Monitoring Regimen:  Morning Fasting, Before Meals   Blood Sugar Trends:  Fluctuating (Comment: A1c  10.2 (4/1/19); 10.9 (1/28/19); 11.3  4/11/18; 10.6 (8/14/18); 12.7 (10/31/18))
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
## 2019-05-07 ENCOUNTER — TELEPHONE (OUTPATIENT)
Dept: INTERNAL MEDICINE CLINIC | Age: 79
End: 2019-05-07

## 2019-05-07 NOTE — TELEPHONE ENCOUNTER
Patient receives supplies from [facility]. Called and left a message for the [facility] [address].

## 2019-05-07 NOTE — TELEPHONE ENCOUNTER
Message     Advise nursing sugars improved   Continue present insulin dosage   ----- Message -----   From: Mike Beaver MA   Sent: 5/7/2019   8:38 AM   To: Val Severs, MD   Subject: Scan                                               The image below was scanned by Mike Beaver MA [[id number]] on 5/7/2019 at 8:38 AM to the following: Cristina Lowry \"Kristie\" [[id number]]:

## 2019-05-07 NOTE — TELEPHONE ENCOUNTER
[address] is checking on the status of continous glucose monitoring supplies and supporting progress notes  Fax: [phone]

## 2019-05-14 ENCOUNTER — TELEPHONE (OUTPATIENT)
Dept: INTERNAL MEDICINE CLINIC | Age: 79
End: 2019-05-14

## 2019-05-14 RX ORDER — LANCETS 30 GAUGE
EACH MISCELLANEOUS
Qty: 300 EACH | Refills: 3 | Status: SHIPPED | OUTPATIENT
Start: 2019-05-14 | End: 2019-05-22 | Stop reason: SDUPTHER

## 2019-05-20 RX ORDER — LEVOTHYROXINE SODIUM 0.07 MG/1
75 TABLET ORAL DAILY
Qty: 30 TABLET | Refills: 0 | Status: SHIPPED | OUTPATIENT
Start: 2019-05-20 | End: 2019-06-21 | Stop reason: SDUPTHER

## 2019-05-20 RX ORDER — PRAVASTATIN SODIUM 80 MG/1
TABLET ORAL
Qty: 90 TABLET | Refills: 0 | Status: SHIPPED | OUTPATIENT
Start: 2019-05-20 | End: 2019-08-23 | Stop reason: SDUPTHER

## 2019-05-22 RX ORDER — LANCETS 30 GAUGE
EACH MISCELLANEOUS
Qty: 300 EACH | Refills: 3 | Status: SHIPPED | OUTPATIENT
Start: 2019-05-22 | End: 2020-06-10

## 2019-06-06 RX ORDER — LAMOTRIGINE 25 MG/1
25 TABLET ORAL DAILY
Qty: 90 TABLET | Refills: 0 | Status: SHIPPED | OUTPATIENT
Start: 2019-06-06 | End: 2019-09-18 | Stop reason: SDUPTHER

## 2019-06-13 RX ORDER — TRAZODONE HYDROCHLORIDE 50 MG/1
TABLET ORAL
Qty: 90 TABLET | Refills: 0 | Status: SHIPPED | OUTPATIENT
Start: 2019-06-13 | End: 2019-09-18 | Stop reason: SDUPTHER

## 2019-06-18 RX ORDER — INSULIN LISPRO 100 [IU]/ML
INJECTION, SOLUTION INTRAVENOUS; SUBCUTANEOUS
Qty: 15 ML | Refills: 0 | Status: SHIPPED | OUTPATIENT
Start: 2019-06-18 | End: 2019-06-18 | Stop reason: SDUPTHER

## 2019-06-18 RX ORDER — INSULIN LISPRO 100 [IU]/ML
INJECTION, SOLUTION INTRAVENOUS; SUBCUTANEOUS
Qty: 18 ML | Refills: 0 | Status: SHIPPED | OUTPATIENT
Start: 2019-06-18 | End: 2019-08-04 | Stop reason: SDUPTHER

## 2019-06-21 RX ORDER — LEVOTHYROXINE SODIUM 0.07 MG/1
75 TABLET ORAL DAILY
Qty: 30 TABLET | Refills: 0 | Status: SHIPPED | OUTPATIENT
Start: 2019-06-21 | End: 2019-07-28 | Stop reason: SDUPTHER

## 2019-07-03 ENCOUNTER — CARE COORDINATION (OUTPATIENT)
Dept: CARE COORDINATION | Age: 79
End: 2019-07-03

## 2019-07-05 ENCOUNTER — TELEPHONE (OUTPATIENT)
Dept: INTERNAL MEDICINE CLINIC | Age: 79
End: 2019-07-05

## 2019-07-08 ENCOUNTER — OFFICE VISIT (OUTPATIENT)
Dept: INTERNAL MEDICINE CLINIC | Age: 79
End: 2019-07-08
Payer: MEDICARE

## 2019-07-08 VITALS
WEIGHT: 197.6 LBS | HEIGHT: 68 IN | SYSTOLIC BLOOD PRESSURE: 134 MMHG | DIASTOLIC BLOOD PRESSURE: 68 MMHG | HEART RATE: 90 BPM | BODY MASS INDEX: 29.95 KG/M2 | OXYGEN SATURATION: 97 %

## 2019-07-08 DIAGNOSIS — E78.2 MIXED HYPERLIPIDEMIA: ICD-10-CM

## 2019-07-08 DIAGNOSIS — I10 ESSENTIAL HYPERTENSION: ICD-10-CM

## 2019-07-08 DIAGNOSIS — E03.4 HYPOTHYROIDISM DUE TO ACQUIRED ATROPHY OF THYROID: ICD-10-CM

## 2019-07-08 DIAGNOSIS — E11.9 TYPE 2 DIABETES MELLITUS TREATED WITH INSULIN (HCC): Primary | ICD-10-CM

## 2019-07-08 DIAGNOSIS — Z79.4 TYPE 2 DIABETES MELLITUS TREATED WITH INSULIN (HCC): Primary | ICD-10-CM

## 2019-07-08 PROBLEM — R21 RASH: Status: RESOLVED | Noted: 2019-04-01 | Resolved: 2019-07-08

## 2019-07-08 LAB — HBA1C MFR BLD: 9.2 %

## 2019-07-08 PROCEDURE — G8400 PT W/DXA NO RESULTS DOC: HCPCS | Performed by: INTERNAL MEDICINE

## 2019-07-08 PROCEDURE — 1090F PRES/ABSN URINE INCON ASSESS: CPT | Performed by: INTERNAL MEDICINE

## 2019-07-08 PROCEDURE — 99214 OFFICE O/P EST MOD 30 MIN: CPT | Performed by: INTERNAL MEDICINE

## 2019-07-08 PROCEDURE — G8417 CALC BMI ABV UP PARAM F/U: HCPCS | Performed by: INTERNAL MEDICINE

## 2019-07-08 PROCEDURE — G8427 DOCREV CUR MEDS BY ELIG CLIN: HCPCS | Performed by: INTERNAL MEDICINE

## 2019-07-08 PROCEDURE — 4040F PNEUMOC VAC/ADMIN/RCVD: CPT | Performed by: INTERNAL MEDICINE

## 2019-07-08 PROCEDURE — 1123F ACP DISCUSS/DSCN MKR DOCD: CPT | Performed by: INTERNAL MEDICINE

## 2019-07-08 PROCEDURE — 83036 HEMOGLOBIN GLYCOSYLATED A1C: CPT | Performed by: INTERNAL MEDICINE

## 2019-07-08 PROCEDURE — 1036F TOBACCO NON-USER: CPT | Performed by: INTERNAL MEDICINE

## 2019-07-08 RX ORDER — LISINOPRIL 20 MG/1
TABLET ORAL
Qty: 90 TABLET | Refills: 0 | Status: SHIPPED | OUTPATIENT
Start: 2019-07-08 | End: 2019-10-19 | Stop reason: SDUPTHER

## 2019-07-08 RX ORDER — MIRTAZAPINE 15 MG/1
TABLET, FILM COATED ORAL
Qty: 45 TABLET | Refills: 0 | Status: SHIPPED | OUTPATIENT
Start: 2019-07-08 | End: 2019-10-17 | Stop reason: SDUPTHER

## 2019-07-08 ASSESSMENT — ENCOUNTER SYMPTOMS
GASTROINTESTINAL NEGATIVE: 1
RESPIRATORY NEGATIVE: 1
WHEEZING: 0
SHORTNESS OF BREATH: 0
CHEST TIGHTNESS: 0

## 2019-07-08 ASSESSMENT — PATIENT HEALTH QUESTIONNAIRE - PHQ9
SUM OF ALL RESPONSES TO PHQ QUESTIONS 1-9: 0
SUM OF ALL RESPONSES TO PHQ QUESTIONS 1-9: 0
SUM OF ALL RESPONSES TO PHQ9 QUESTIONS 1 & 2: 0
2. FEELING DOWN, DEPRESSED OR HOPELESS: 0
1. LITTLE INTEREST OR PLEASURE IN DOING THINGS: 0

## 2019-07-08 NOTE — ASSESSMENT & PLAN NOTE
Sugar improved but still missing insulin when out of facility  Continue nursing help and documentation of sugars and Humalog prn that she will give herself when she is off the property  She agrees to use correctly and to follow SS  Recheck in 3 months

## 2019-07-22 ENCOUNTER — TELEPHONE (OUTPATIENT)
Dept: INTERNAL MEDICINE CLINIC | Age: 79
End: 2019-07-22

## 2019-07-29 RX ORDER — LEVOTHYROXINE SODIUM 0.07 MG/1
75 TABLET ORAL DAILY
Qty: 90 TABLET | Refills: 0 | Status: SHIPPED | OUTPATIENT
Start: 2019-07-29 | End: 2019-09-04

## 2019-07-29 RX ORDER — LEVOTHYROXINE SODIUM 0.07 MG/1
75 TABLET ORAL DAILY
Qty: 30 TABLET | Refills: 0 | Status: SHIPPED | OUTPATIENT
Start: 2019-07-29 | End: 2019-07-29 | Stop reason: SDUPTHER

## 2019-08-05 RX ORDER — INSULIN LISPRO 100 [IU]/ML
INJECTION, SOLUTION INTRAVENOUS; SUBCUTANEOUS
Qty: 18 ML | Refills: 0 | Status: SHIPPED | OUTPATIENT
Start: 2019-08-05 | End: 2019-10-13 | Stop reason: SDUPTHER

## 2019-08-12 RX ORDER — OMEPRAZOLE 40 MG/1
40 CAPSULE, DELAYED RELEASE ORAL DAILY
Qty: 90 CAPSULE | Refills: 0 | Status: SHIPPED | OUTPATIENT
Start: 2019-08-12 | End: 2019-11-10 | Stop reason: SDUPTHER

## 2019-08-26 RX ORDER — PRAVASTATIN SODIUM 80 MG/1
TABLET ORAL
Qty: 90 TABLET | Refills: 0 | Status: SHIPPED | OUTPATIENT
Start: 2019-08-26 | End: 2019-11-19 | Stop reason: SDUPTHER

## 2019-09-04 RX ORDER — LEVOTHYROXINE SODIUM 0.07 MG/1
75 TABLET ORAL DAILY
Qty: 30 TABLET | Refills: 0 | Status: SHIPPED | OUTPATIENT
Start: 2019-09-04 | End: 2019-10-11 | Stop reason: SDUPTHER

## 2019-09-18 RX ORDER — TRAZODONE HYDROCHLORIDE 50 MG/1
TABLET ORAL
Qty: 90 TABLET | Refills: 0 | Status: SHIPPED | OUTPATIENT
Start: 2019-09-18 | End: 2019-12-09 | Stop reason: SDUPTHER

## 2019-09-18 RX ORDER — LAMOTRIGINE 25 MG/1
25 TABLET ORAL DAILY
Qty: 90 TABLET | Refills: 0 | Status: SHIPPED | OUTPATIENT
Start: 2019-09-18 | End: 2019-12-09 | Stop reason: SDUPTHER

## 2019-10-14 ENCOUNTER — OFFICE VISIT (OUTPATIENT)
Dept: INTERNAL MEDICINE CLINIC | Age: 79
End: 2019-10-14
Payer: MEDICARE

## 2019-10-14 VITALS
SYSTOLIC BLOOD PRESSURE: 132 MMHG | DIASTOLIC BLOOD PRESSURE: 76 MMHG | BODY MASS INDEX: 31.63 KG/M2 | HEART RATE: 88 BPM | WEIGHT: 208 LBS

## 2019-10-14 DIAGNOSIS — E11.9 TYPE 2 DIABETES MELLITUS TREATED WITH INSULIN (HCC): Primary | ICD-10-CM

## 2019-10-14 DIAGNOSIS — Z79.4 TYPE 2 DIABETES MELLITUS TREATED WITH INSULIN (HCC): Primary | ICD-10-CM

## 2019-10-14 DIAGNOSIS — I10 ESSENTIAL HYPERTENSION: ICD-10-CM

## 2019-10-14 DIAGNOSIS — E78.2 MIXED HYPERLIPIDEMIA: ICD-10-CM

## 2019-10-14 DIAGNOSIS — Z23 NEED FOR INFLUENZA VACCINATION: ICD-10-CM

## 2019-10-14 DIAGNOSIS — M89.9 DISORDER OF BONE, UNSPECIFIED: ICD-10-CM

## 2019-10-14 DIAGNOSIS — E03.4 HYPOTHYROIDISM DUE TO ACQUIRED ATROPHY OF THYROID: ICD-10-CM

## 2019-10-14 PROCEDURE — 4040F PNEUMOC VAC/ADMIN/RCVD: CPT | Performed by: INTERNAL MEDICINE

## 2019-10-14 PROCEDURE — G8428 CUR MEDS NOT DOCUMENT: HCPCS | Performed by: INTERNAL MEDICINE

## 2019-10-14 PROCEDURE — G0008 ADMIN INFLUENZA VIRUS VAC: HCPCS | Performed by: INTERNAL MEDICINE

## 2019-10-14 PROCEDURE — G8400 PT W/DXA NO RESULTS DOC: HCPCS | Performed by: INTERNAL MEDICINE

## 2019-10-14 PROCEDURE — 1123F ACP DISCUSS/DSCN MKR DOCD: CPT | Performed by: INTERNAL MEDICINE

## 2019-10-14 PROCEDURE — 1036F TOBACCO NON-USER: CPT | Performed by: INTERNAL MEDICINE

## 2019-10-14 PROCEDURE — G8484 FLU IMMUNIZE NO ADMIN: HCPCS | Performed by: INTERNAL MEDICINE

## 2019-10-14 PROCEDURE — 1090F PRES/ABSN URINE INCON ASSESS: CPT | Performed by: INTERNAL MEDICINE

## 2019-10-14 PROCEDURE — 99214 OFFICE O/P EST MOD 30 MIN: CPT | Performed by: INTERNAL MEDICINE

## 2019-10-14 PROCEDURE — 90653 IIV ADJUVANT VACCINE IM: CPT | Performed by: INTERNAL MEDICINE

## 2019-10-14 PROCEDURE — G8417 CALC BMI ABV UP PARAM F/U: HCPCS | Performed by: INTERNAL MEDICINE

## 2019-10-14 RX ORDER — LEVOTHYROXINE SODIUM 0.07 MG/1
75 TABLET ORAL DAILY
Qty: 30 TABLET | Refills: 0 | Status: SHIPPED | OUTPATIENT
Start: 2019-10-14 | End: 2020-02-17

## 2019-10-14 RX ORDER — INSULIN LISPRO 100 [IU]/ML
INJECTION, SOLUTION INTRAVENOUS; SUBCUTANEOUS
Qty: 18 ML | Refills: 0 | Status: SHIPPED | OUTPATIENT
Start: 2019-10-14 | End: 2019-10-23 | Stop reason: SDUPTHER

## 2019-10-18 RX ORDER — MIRTAZAPINE 15 MG/1
TABLET, FILM COATED ORAL
Qty: 45 TABLET | Refills: 0 | Status: SHIPPED | OUTPATIENT
Start: 2019-10-18 | End: 2020-01-06

## 2019-10-21 RX ORDER — LISINOPRIL 20 MG/1
TABLET ORAL
Qty: 90 TABLET | Refills: 0 | Status: SHIPPED | OUTPATIENT
Start: 2019-10-21 | End: 2020-01-30 | Stop reason: SDUPTHER

## 2019-10-22 ENCOUNTER — TELEPHONE (OUTPATIENT)
Dept: INTERNAL MEDICINE CLINIC | Age: 79
End: 2019-10-22

## 2019-10-28 ENCOUNTER — TELEPHONE (OUTPATIENT)
Dept: INTERNAL MEDICINE CLINIC | Age: 79
End: 2019-10-28

## 2019-11-11 RX ORDER — OMEPRAZOLE 40 MG/1
40 CAPSULE, DELAYED RELEASE ORAL DAILY
Qty: 90 CAPSULE | Refills: 0 | Status: SHIPPED | OUTPATIENT
Start: 2019-11-11 | End: 2020-02-17

## 2019-11-20 RX ORDER — PRAVASTATIN SODIUM 80 MG/1
TABLET ORAL
Qty: 90 TABLET | Refills: 0 | Status: SHIPPED | OUTPATIENT
Start: 2019-11-20 | End: 2020-02-17

## 2019-11-22 ENCOUNTER — HOSPITAL ENCOUNTER (OUTPATIENT)
Age: 79
Discharge: HOME | End: 2019-11-22
Payer: MEDICARE

## 2019-11-22 ENCOUNTER — HOSPITAL ENCOUNTER (OUTPATIENT)
Dept: GENERAL RADIOLOGY | Age: 79
Discharge: HOME | End: 2019-11-22
Payer: MEDICARE

## 2019-11-22 ENCOUNTER — OFFICE VISIT (OUTPATIENT)
Dept: INTERNAL MEDICINE CLINIC | Age: 79
End: 2019-11-22
Payer: MEDICARE

## 2019-11-22 VITALS
BODY MASS INDEX: 31.02 KG/M2 | OXYGEN SATURATION: 94 % | SYSTOLIC BLOOD PRESSURE: 130 MMHG | HEART RATE: 85 BPM | WEIGHT: 204 LBS | DIASTOLIC BLOOD PRESSURE: 80 MMHG

## 2019-11-22 DIAGNOSIS — R10.84 GENERALIZED ABDOMINAL PAIN: ICD-10-CM

## 2019-11-22 DIAGNOSIS — R10.84 GENERALIZED ABDOMINAL PAIN: Primary | ICD-10-CM

## 2019-11-22 DIAGNOSIS — R14.0 ABDOMINAL BLOATING: ICD-10-CM

## 2019-11-22 PROCEDURE — 74018 RADEX ABDOMEN 1 VIEW: CPT

## 2019-11-22 PROCEDURE — 1123F ACP DISCUSS/DSCN MKR DOCD: CPT | Performed by: NURSE PRACTITIONER

## 2019-11-22 PROCEDURE — 1090F PRES/ABSN URINE INCON ASSESS: CPT | Performed by: NURSE PRACTITIONER

## 2019-11-22 PROCEDURE — G8400 PT W/DXA NO RESULTS DOC: HCPCS | Performed by: NURSE PRACTITIONER

## 2019-11-22 PROCEDURE — G8482 FLU IMMUNIZE ORDER/ADMIN: HCPCS | Performed by: NURSE PRACTITIONER

## 2019-11-22 PROCEDURE — G8427 DOCREV CUR MEDS BY ELIG CLIN: HCPCS | Performed by: NURSE PRACTITIONER

## 2019-11-22 PROCEDURE — 1036F TOBACCO NON-USER: CPT | Performed by: NURSE PRACTITIONER

## 2019-11-22 PROCEDURE — 4040F PNEUMOC VAC/ADMIN/RCVD: CPT | Performed by: NURSE PRACTITIONER

## 2019-11-22 PROCEDURE — 99214 OFFICE O/P EST MOD 30 MIN: CPT | Performed by: NURSE PRACTITIONER

## 2019-11-22 PROCEDURE — G8417 CALC BMI ABV UP PARAM F/U: HCPCS | Performed by: NURSE PRACTITIONER

## 2019-11-22 ASSESSMENT — ENCOUNTER SYMPTOMS
ABDOMINAL DISTENTION: 1
BACK PAIN: 0
COLOR CHANGE: 0
CONSTIPATION: 1
SINUS PRESSURE: 0
NAUSEA: 0
VOMITING: 0
ABDOMINAL PAIN: 1
WHEEZING: 0
SHORTNESS OF BREATH: 0
COUGH: 0
SORE THROAT: 0
DIARRHEA: 1
SINUS PAIN: 0

## 2019-12-02 RX ORDER — INSULIN GLARGINE 100 [IU]/ML
INJECTION, SOLUTION SUBCUTANEOUS
Qty: 15 ML | Refills: 0 | Status: SHIPPED | OUTPATIENT
Start: 2019-12-02 | End: 2020-01-15

## 2019-12-09 RX ORDER — LAMOTRIGINE 25 MG/1
25 TABLET ORAL DAILY
Qty: 90 TABLET | Refills: 0 | Status: SHIPPED | OUTPATIENT
Start: 2019-12-09 | End: 2020-03-24 | Stop reason: SDUPTHER

## 2019-12-09 RX ORDER — TRAZODONE HYDROCHLORIDE 50 MG/1
TABLET ORAL
Qty: 90 TABLET | Refills: 0 | Status: SHIPPED | OUTPATIENT
Start: 2019-12-09 | End: 2020-03-12 | Stop reason: SDUPTHER

## 2020-01-03 ENCOUNTER — TELEPHONE (OUTPATIENT)
Dept: INTERNAL MEDICINE CLINIC | Age: 80
End: 2020-01-03

## 2020-01-06 RX ORDER — MIRTAZAPINE 15 MG/1
TABLET, FILM COATED ORAL
Qty: 45 TABLET | Refills: 0 | Status: SHIPPED | OUTPATIENT
Start: 2020-01-06 | End: 2020-03-12

## 2020-01-15 RX ORDER — INSULIN GLARGINE 100 [IU]/ML
INJECTION, SOLUTION SUBCUTANEOUS
Qty: 15 ML | Refills: 0 | Status: SHIPPED | OUTPATIENT
Start: 2020-01-15 | End: 2020-02-11

## 2020-01-16 ENCOUNTER — HOSPITAL ENCOUNTER (EMERGENCY)
Age: 80
Discharge: HOME | End: 2020-01-16
Attending: EMERGENCY MEDICINE
Payer: MEDICARE

## 2020-01-16 VITALS
RESPIRATION RATE: 20 BRPM | TEMPERATURE: 97.5 F | SYSTOLIC BLOOD PRESSURE: 156 MMHG | DIASTOLIC BLOOD PRESSURE: 61 MMHG | HEART RATE: 65 BPM | OXYGEN SATURATION: 94 %

## 2020-01-16 LAB
A/G RATIO: 1.5 (ref 1.1–2.2)
ALBUMIN SERPL-MCNC: 4 G/DL (ref 3.4–5)
ALP BLD-CCNC: 100 U/L (ref 40–129)
ALT SERPL-CCNC: 17 U/L (ref 10–40)
AMORPHOUS: ABNORMAL /HPF
ANION GAP SERPL CALCULATED.3IONS-SCNC: 13 MMOL/L (ref 3–16)
AST SERPL-CCNC: 20 U/L (ref 15–37)
BACTERIA: ABNORMAL /HPF
BASE EXCESS VENOUS: -2.5 MMOL/L (ref -2–3)
BASOPHILS ABSOLUTE: 0 K/UL (ref 0–0.2)
BASOPHILS RELATIVE PERCENT: 0.5 %
BILIRUB SERPL-MCNC: 0.3 MG/DL (ref 0–1)
BILIRUBIN URINE: NEGATIVE
BLOOD, URINE: NEGATIVE
BUN BLDV-MCNC: 22 MG/DL (ref 7–20)
CALCIUM SERPL-MCNC: 9.3 MG/DL (ref 8.3–10.6)
CARBOXYHEMOGLOBIN: 1.7 % (ref 0–1.5)
CASTS: ABNORMAL /LPF
CHLORIDE BLD-SCNC: 102 MMOL/L (ref 99–110)
CLARITY: CLEAR
CO2: 21 MMOL/L (ref 21–32)
COLOR: YELLOW
CREAT SERPL-MCNC: 1.4 MG/DL (ref 0.6–1.2)
EKG ATRIAL RATE: 63 BPM
EKG DIAGNOSIS: NORMAL
EKG P AXIS: 55 DEGREES
EKG P-R INTERVAL: 190 MS
EKG Q-T INTERVAL: 454 MS
EKG QRS DURATION: 108 MS
EKG QTC CALCULATION (BAZETT): 464 MS
EKG R AXIS: -14 DEGREES
EKG T AXIS: 11 DEGREES
EKG VENTRICULAR RATE: 63 BPM
EOSINOPHILS ABSOLUTE: 0.2 K/UL (ref 0–0.6)
EOSINOPHILS RELATIVE PERCENT: 1.6 %
EPITHELIAL CELLS, UA: ABNORMAL /HPF
GFR AFRICAN AMERICAN: 44
GFR NON-AFRICAN AMERICAN: 36
GLOBULIN: 2.7 G/DL
GLUCOSE BLD-MCNC: 135 MG/DL (ref 70–99)
GLUCOSE BLD-MCNC: 168 MG/DL (ref 70–99)
GLUCOSE URINE: NEGATIVE MG/DL
HCO3 VENOUS: 23.2 MMOL/L (ref 24–28)
HCT VFR BLD CALC: 33.3 % (ref 36–48)
HEMOGLOBIN, VEN, REDUCED: 37.4 %
HEMOGLOBIN: 11.1 G/DL (ref 12–16)
KETONES, URINE: NEGATIVE MG/DL
LACTIC ACID: 1.1 MMOL/L (ref 0.4–2)
LEUKOCYTE ESTERASE, URINE: NEGATIVE
LIPASE: 28 U/L (ref 13–60)
LYMPHOCYTES ABSOLUTE: 1.5 K/UL (ref 1–5.1)
LYMPHOCYTES RELATIVE PERCENT: 15.3 %
MCH RBC QN AUTO: 32.2 PG (ref 26–34)
MCHC RBC AUTO-ENTMCNC: 33.4 G/DL (ref 31–36)
MCV RBC AUTO: 96.5 FL (ref 80–100)
METHEMOGLOBIN VENOUS: 0.6 % (ref 0–1.5)
MICROSCOPIC EXAMINATION: YES
MONOCYTES ABSOLUTE: 0.7 K/UL (ref 0–1.3)
MONOCYTES RELATIVE PERCENT: 6.6 %
MUCUS: ABNORMAL /LPF
NEUTROPHILS ABSOLUTE: 7.6 K/UL (ref 1.7–7.7)
NEUTROPHILS RELATIVE PERCENT: 76 %
NITRITE, URINE: NEGATIVE
O2 SAT, VEN: 62 %
PCO2, VEN: 45.8 MMHG (ref 41–51)
PDW BLD-RTO: 13.2 % (ref 12.4–15.4)
PERFORMED ON: ABNORMAL
PH UA: 6.5 (ref 5–8)
PH VENOUS: 7.33 (ref 7.35–7.45)
PLATELET # BLD: 226 K/UL (ref 135–450)
PMV BLD AUTO: 8.3 FL (ref 5–10.5)
PO2, VEN: 34.4 MMHG (ref 25–40)
POTASSIUM REFLEX MAGNESIUM: 4.3 MMOL/L (ref 3.5–5.1)
PROTEIN UA: 100 MG/DL
RBC # BLD: 3.45 M/UL (ref 4–5.2)
RBC UA: ABNORMAL /HPF (ref 0–2)
SODIUM BLD-SCNC: 136 MMOL/L (ref 136–145)
SPECIFIC GRAVITY UA: 1.02 (ref 1–1.03)
TCO2 CALC VENOUS: 25 MMOL/L
TOTAL PROTEIN: 6.7 G/DL (ref 6.4–8.2)
URINE REFLEX TO CULTURE: ABNORMAL
URINE TYPE: ABNORMAL
UROBILINOGEN, URINE: 1 E.U./DL
WBC # BLD: 10 K/UL (ref 4–11)
WBC UA: ABNORMAL /HPF (ref 0–5)

## 2020-01-16 PROCEDURE — 85025 COMPLETE CBC W/AUTO DIFF WBC: CPT

## 2020-01-16 PROCEDURE — 82803 BLOOD GASES ANY COMBINATION: CPT

## 2020-01-16 PROCEDURE — 93005 ELECTROCARDIOGRAM TRACING: CPT | Performed by: EMERGENCY MEDICINE

## 2020-01-16 PROCEDURE — 80053 COMPREHEN METABOLIC PANEL: CPT

## 2020-01-16 PROCEDURE — 83605 ASSAY OF LACTIC ACID: CPT

## 2020-01-16 PROCEDURE — 83690 ASSAY OF LIPASE: CPT

## 2020-01-16 PROCEDURE — 6370000000 HC RX 637 (ALT 250 FOR IP): Performed by: EMERGENCY MEDICINE

## 2020-01-16 PROCEDURE — 99284 EMERGENCY DEPT VISIT MOD MDM: CPT

## 2020-01-16 PROCEDURE — 36415 COLL VENOUS BLD VENIPUNCTURE: CPT

## 2020-01-16 PROCEDURE — 81001 URINALYSIS AUTO W/SCOPE: CPT

## 2020-01-16 PROCEDURE — 2580000003 HC RX 258: Performed by: EMERGENCY MEDICINE

## 2020-01-16 RX ORDER — SODIUM CHLORIDE, SODIUM LACTATE, POTASSIUM CHLORIDE, CALCIUM CHLORIDE 600; 310; 30; 20 MG/100ML; MG/100ML; MG/100ML; MG/100ML
1000 INJECTION, SOLUTION INTRAVENOUS ONCE
Status: COMPLETED | OUTPATIENT
Start: 2020-01-16 | End: 2020-01-16

## 2020-01-16 RX ORDER — DICYCLOMINE HCL 20 MG
20 TABLET ORAL EVERY 6 HOURS PRN
Qty: 20 TABLET | Refills: 0 | Status: SHIPPED | OUTPATIENT
Start: 2020-01-16 | End: 2020-02-26

## 2020-01-16 RX ORDER — DICYCLOMINE HCL 20 MG
20 TABLET ORAL ONCE
Status: COMPLETED | OUTPATIENT
Start: 2020-01-16 | End: 2020-01-16

## 2020-01-16 RX ADMIN — DICYCLOMINE HYDROCHLORIDE 20 MG: 20 TABLET ORAL at 15:50

## 2020-01-16 RX ADMIN — SODIUM CHLORIDE, POTASSIUM CHLORIDE, SODIUM LACTATE AND CALCIUM CHLORIDE 1000 ML: 600; 310; 30; 20 INJECTION, SOLUTION INTRAVENOUS at 15:17

## 2020-01-16 ASSESSMENT — PAIN - FUNCTIONAL ASSESSMENT: PAIN_FUNCTIONAL_ASSESSMENT: PREVENTS OR INTERFERES SOME ACTIVE ACTIVITIES AND ADLS

## 2020-01-16 ASSESSMENT — ENCOUNTER SYMPTOMS
ABDOMINAL PAIN: 1
DIARRHEA: 1

## 2020-01-16 ASSESSMENT — PAIN SCALES - WONG BAKER: WONGBAKER_NUMERICALRESPONSE: 8

## 2020-01-16 ASSESSMENT — PAIN DESCRIPTION - ONSET: ONSET: PROGRESSIVE

## 2020-01-16 ASSESSMENT — PAIN DESCRIPTION - ORIENTATION: ORIENTATION: LOWER

## 2020-01-16 ASSESSMENT — PAIN DESCRIPTION - DESCRIPTORS: DESCRIPTORS: CONSTANT

## 2020-01-16 ASSESSMENT — PAIN DESCRIPTION - PROGRESSION: CLINICAL_PROGRESSION: GRADUALLY WORSENING

## 2020-01-16 ASSESSMENT — PAIN DESCRIPTION - FREQUENCY: FREQUENCY: CONTINUOUS

## 2020-01-16 ASSESSMENT — PAIN SCALES - GENERAL: PAINLEVEL_OUTOF10: 8

## 2020-01-16 ASSESSMENT — PAIN DESCRIPTION - PAIN TYPE: TYPE: ACUTE PAIN

## 2020-01-16 ASSESSMENT — PAIN DESCRIPTION - LOCATION: LOCATION: ABDOMEN

## 2020-01-16 NOTE — ED NOTES
Bed: B16-16  Expected date:   Expected time:   Means of arrival:   Comments:  No Alonso, RN  01/16/20 4049

## 2020-01-16 NOTE — ED PROVIDER NOTES
[address] [facility]          ATTENDING PHYSICIAN NOTE       Date of evaluation: 2020    Chief Complaint     Diarrhea (+abdominal cramping; pt from [facility]; states she ate a bagael today then started cramping then went to the bathroom on herself; pt arrived covered in stool; stool was too formed to send to lab for c.diff)      History of Present Illness     Vadim Pardo is a 78 y.o. female with history of insulin-dependent diabetes, hypertension, hyperlipidemia, obesity, hypothyroidism, multiple prior abdominal surgeries presenting for abdominal cramping and diarrhea. Patient states after eating breakfast earlier today she began to have some cramping abdominal pain near her umbilicus. It did not radiate elsewhere. She then had an episode of diarrhea that was not black or bloody. She denies fevers or vomiting. No dysuria. Pain is overall improved since the bowel movement. No sick contacts with similar symptoms. No recent antibiotic use, no history of C. Difficile. Review of Systems     Review of Systems   Constitutional: Negative for fever. Gastrointestinal: Positive for abdominal pain and diarrhea. All other systems reviewed and are negative. Past Medical, Surgical, Family, and Social History     She has a past medical history of Almonte's esophagus without dysplasia, Depression, DM2 (diabetes mellitus, type 2) ([facility] [address] History of blood transfusion, Hyperlipidemia, Hypertension, Hypothyroid, and Shingles. She has a past surgical history that includes knee surgery (2011); Cataract removal (2011); Tonsillectomy;  section; bladder suspension; lumbar laminectomy (3/2/15); and Cholecystectomy, laparoscopic (10/17/2017). Her family history includes Coronary Art Dis in her father and mother; Diabetes in her mother; Other in her mother and sister. She reports that she has never smoked.  She has never used smokeless tobacco. She reports that she does not drink alcohol or use drugs. Medications     Discharge Medication List as of 1/16/2020  7:06 PM      CONTINUE these medications which have NOT CHANGED    Details   pravastatin (PRAVACHOL) 80 MG tablet TAKE 1 TABLET BY MOUTH DAILY, Disp-90 tablet, R-0Normal      omeprazole (PRILOSEC) 40 MG delayed release capsule TAKE 1 CAPSULE BY MOUTH DAILY, Disp-90 capsule, R-0Normal      Insulin Pen Needle (NOVOFINE AUTOCOVER) 30G X 8 MM MISC Disp-360 each, R-0, NormalUsing 4 times daily per E11.9      insulin lispro (HUMALOG) 100 UNIT/ML pen ADMINISTER 7 UNITS UNDER THE SKIN THREE TIMES DAILY WITH MEALS, Disp-18 mL, R-0Normal      lisinopril (PRINIVIL;ZESTRIL) 20 MG tablet TAKE 1 TABLET BY MOUTH DAILY, Disp-90 tablet, R-0Normal      levothyroxine (SYNTHROID) 75 MCG tablet TAKE 1 TABLET BY MOUTH DAILY, Disp-30 tablet, R-0Normal      Lancets MISC Disp-300 each, R-3, NormalTesting 4 times daily per E11.9      mometasone (ELOCON) 0.1 % cream Apply topically daily. , Disp-15 g, R-0, Normal      ACCU-CHEK RAVI PLUS strip TEST 2 TO 3 TIMES DAILY, Disp-200 strip, R-0**Patient requests 90 days supply**Normal      Blood Glucose Monitoring Suppl (EASY STEP GLUCOSE MONITOR) w/Device KIT Disp-1 kit, R-0, NormalPatient testing 2-3 times daily per E11.9  Patient also needs test strips and lancets. diphenhydrAMINE (BENADRYL) 25 MG capsule Take 25 mg by mouth every 6 hours as needed for ItchingHistorical Med      citalopram (CELEXA) 10 MG tablet Take 1 tablet by mouth daily, Disp-30 tablet, R-3Normal      Multiple Vitamins-Minerals (THERAPEUTIC MULTIVITAMIN-MINERALS) tablet Take 1 tablet by mouth daily.       LANTUS SOLOSTAR 100 UNIT/ML injection pen ADMINISTER 34 UNITS UNDER THE SKIN EVERY NIGHT, Disp-15 mL, R-0Normal      mirtazapine (REMERON) 15 MG tablet TAKE 1/2 TABLET BY MOUTH EVERY NIGHT AT BEDTIME, Disp-45 tablet, R-0Normal      lamoTRIgine (LAMICTAL) 25 MG tablet TAKE 1 TABLET BY MOUTH DAILY, Disp-90 tablet, R-0Normal traZODone (DESYREL) 50 MG tablet TAKE 1 TABLET BY MOUTH DAILY, Disp-90 tablet, R-0Normal             Allergies     She is allergic to lipitor [atorvastatin]; nickel; and oxycodone-acetaminophen. Physical Exam     INITIAL VITALS: BP: (!) 149/50, Temp: 97.5 °F (36.4 °C), Pulse: 63, Resp: 18, SpO2: 95 %     Nursing note and vitals reviewed. General:  Adult female, alert and appropriately interactive. In no distress. HENT: Normocephalic and atraumatic. External ears normal. Nose appears normal externally. Eyes: Conjunctivae normal. No scleral icterus. Neck: Neck supple. No tracheal deviation present. CV: Normal rate. Regular rhythm. S1/S2 auscultated. No murmurs, gallops or rubs. Chest: Effort normal. Breath sounds clear to auscultation bilaterally. No wheezes. No rales or rhonchi. Abdominal: Soft. No distension. Mild central tenderness. No rebound or guarding. No masses. No peritoneal signs. Musculoskeletal: No edema. No gross deformities. Neurological: Alert and appropriately interactive. Face symmetric, speech without dysarthria or obvious aphasia. Moving all extremities spontaneously. Skin: Warm, dry. No rash. No diaphoresis or erythema. Psychiatric: Calm and cooperative with appropriate mood and affect.          Diagnostic Results       RADIOLOGY:  DIAGNOSTIC SCANNED REPORT   Final Result          LABS:   Results for orders placed or performed during the hospital encounter of 01/16/20   Lactic Acid, Plasma   Result Value Ref Range    Lactic Acid 1.1 0.4 - 2.0 mmol/L   Blood Gas, Venous   Result Value Ref Range    pH, Fuentes 7.325 (L) 7.350 - 7.450    pCO2, Fuentes 45.8 41.0 - 51.0 mmHg    pO2, Fuentes 34.4 25.0 - 40.0 mmHg    HCO3, Venous 23.2 (L) 24.0 - 28.0 mmol/L    Base Excess, Fuentes -2.5 (L) -2.0 - 3.0 mmol/L    O2 Sat, Fuentes 62 Not established %    Carboxyhemoglobin 1.7 (H) 0.0 - 1.5 %    MetHgb, Fuentes 0.6 0.0 - 1.5 %    TC02 (Calc), Fuentes 25 mmol/L    Hemoglobin, Fuentes, Reduced 37.40 %   CBC Auto Differential   Result Value Ref Range    WBC 10.0 4.0 - 11.0 K/uL    RBC 3.45 (L) 4.00 - 5.20 M/uL    Hemoglobin 11.1 (L) 12.0 - 16.0 g/dL    Hematocrit 33.3 (L) 36.0 - 48.0 %    MCV 96.5 80.0 - 100.0 fL    MCH 32.2 26.0 - 34.0 pg    MCHC 33.4 31.0 - 36.0 g/dL    RDW 13.2 12.4 - 15.4 %    Platelets 054 388 - 860 K/uL    MPV 8.3 5.0 - 10.5 fL    Neutrophils % 76.0 %    Lymphocytes % 15.3 %    Monocytes % 6.6 %    Eosinophils % 1.6 %    Basophils % 0.5 %    Neutrophils Absolute 7.6 1.7 - 7.7 K/uL    Lymphocytes Absolute 1.5 1.0 - 5.1 K/uL    Monocytes Absolute 0.7 0.0 - 1.3 K/uL    Eosinophils Absolute 0.2 0.0 - 0.6 K/uL    Basophils Absolute 0.0 0.0 - 0.2 K/uL   Comprehensive Metabolic Panel w/ Reflex to MG   Result Value Ref Range    Sodium 136 136 - 145 mmol/L    Potassium reflex Magnesium 4.3 3.5 - 5.1 mmol/L    Chloride 102 99 - 110 mmol/L    CO2 21 21 - 32 mmol/L    Anion Gap 13 3 - 16    Glucose 168 (H) 70 - 99 mg/dL    BUN 22 (H) 7 - 20 mg/dL    CREATININE 1.4 (H) 0.6 - 1.2 mg/dL    GFR Non- 36 (A) >60    GFR  44 (A) >60    Calcium 9.3 8.3 - 10.6 mg/dL    Total Protein 6.7 6.4 - 8.2 g/dL    Alb 4.0 3.4 - 5.0 g/dL    Albumin/Globulin Ratio 1.5 1.1 - 2.2    Total Bilirubin 0.3 0.0 - 1.0 mg/dL    Alkaline Phosphatase 100 40 - 129 U/L    ALT 17 10 - 40 U/L    AST 20 15 - 37 U/L    Globulin 2.7 g/dL   Urinalysis Reflex to Culture   Result Value Ref Range    Color, UA Yellow Straw/Yellow    Clarity, UA Clear Clear    Glucose, Ur Negative Negative mg/dL    Bilirubin Urine Negative Negative    Ketones, Urine Negative Negative mg/dL    Specific Gravity, UA 1.020 1.005 - 1.030    Blood, Urine Negative Negative    pH, UA 6.5 5.0 - 8.0    Protein,  (A) Negative mg/dL    Urobilinogen, Urine 1.0 <2.0 E.U./dL    Nitrite, Urine Negative Negative    Leukocyte Esterase, Urine Negative Negative    Microscopic Examination YES     Urine Type Voided     Urine Reflex to Culture Not Indicated Lipase   Result Value Ref Range    Lipase 28.0 13.0 - 60.0 U/L   Microscopic Urinalysis   Result Value Ref Range    Casts 1-3 Hyaline (A) /LPF    Mucus, UA Rare (A) /LPF    WBC, UA 0-2 0 - 5 /HPF    RBC, UA None seen 0 - 2 /HPF    Epi Cells 5-10 /HPF    Bacteria, UA Rare (A) /HPF    Amorphous, UA 1+ (A) /HPF   POCT Glucose   Result Value Ref Range    POC Glucose 135 (H) 70 - 99 mg/dl    Performed on ACCU-CHEK    EKG 12 Lead   Result Value Ref Range    Ventricular Rate 63 BPM    Atrial Rate 63 BPM    P-R Interval 190 ms    QRS Duration 108 ms    Q-T Interval 454 ms    QTc Calculation (Bazett) 464 ms    P Axis 55 degrees    R Axis -14 degrees    T Axis 11 degrees    Diagnosis       EKG performed in ER and to be interpreted by ER physician. Confirmed by MD, ER ([id number]),  Latrell Dietz (EVERETT), OFELIA ([id number]) on 1/16/2020 2:11:56 PM         RECENT VITALS:  BP: (!) 156/61,Temp: 97.5 °F (36.4 °C), Pulse: 65, Resp: 20, SpO2: 94 %     Procedures       ED Course     Nursing Notes, Past Medical Hx, Past Surgical Hx, Social Hx,Allergies, and Family Hx were reviewed. Patient was given the following medications:  Orders Placed This Encounter   Medications    lactated ringers infusion 1,000 mL    dicyclomine (BENTYL) tablet 20 mg    dicyclomine (BENTYL) 20 MG tablet     Sig: Take 1 tablet by mouth every 6 hours as needed (cramping or gas pain)     Dispense:  20 tablet     Refill:  0       CONSULTS:  None    MEDICAL DECISIONMAKING / ASSESSMENT / Briana December D Joshua Addison is a 78 y.o. female who presents emergency department today for diarrhea and central abdominal cramping. On arrival, patient is in no distress but is covered in stool from her episode. Her vital signs are reassuring. Her abdominal exam is benign. Point-of-care glucose in the 100s. Labs obtained and are overall reassuring. No severe acid-base disturbance, likely mild dehydration with slightly elevated BUN/creatinine.   Low suspicion for C. difficile given lack of recent antibiotic use and singular episode today. Patient given IV hydration and overall feeling improved at this time. Abdominal exam remained benign and vital stable in the emergency department. Discharged in stable condition with written and verbal instructions for which to return to the ED. Clinical Impression     1. Central abdominal pain    2.  Diarrhea, unspecified type        Disposition     PATIENT REFERRED TO:  [facility] Emergency Department  [address] [address]  [phone]    If symptoms worsen    Valdemar Soriano MD  [address]  [phone]    Schedule an appointment as soon as possible for a visit in 5 days  For reexamination      DISCHARGE MEDICATIONS:  Discharge Medication List as of 1/16/2020  7:06 PM      START taking these medications    Details   dicyclomine (BENTYL) 20 MG tablet Take 1 tablet by mouth every 6 hours as needed (cramping or gas pain), Disp-20 tablet, R-0Print             DISPOSITION         Ponce Dempsey MD  01/22/20 7544

## 2020-01-16 NOTE — FLOWSHEET NOTE
01/16/20 1533   Encounter Summary   Services provided to: Patient   Referral/Consult From: Rounding   Continue Visiting   (Seen 1/16/2020, DAISY. )   Complexity of Encounter Moderate   Length of Encounter 15 minutes   Routine   Type Initial   Assessment Approachable   Intervention Nurtured hope   Outcome Expressed gratitude

## 2020-01-16 NOTE — ED NOTES
TWO RN'S ATTEMPTED PERIPHERAL IV'S BUT WERE UNSUCCESSFUL. MD MADE AWARE.       Jeyson Emery, OLIVIER  01/16/20 1375

## 2020-01-17 NOTE — ED NOTES
Discharge instructions and prescription  reviewed with patient. Pt verbalized understanding. IV d/c. Pt tolerated well. Pt given hospital pants and a depends to go home in. Pt discharged home with friend.      Alfredito Madsen RN  01/16/20 1929

## 2020-01-21 ENCOUNTER — TELEPHONE (OUTPATIENT)
Dept: INTERNAL MEDICINE CLINIC | Age: 80
End: 2020-01-21

## 2020-01-21 NOTE — TELEPHONE ENCOUNTER
[facility] needs most recent [address]  faxed to Alex at fax number [phone].

## 2020-01-30 ENCOUNTER — TELEPHONE (OUTPATIENT)
Dept: INTERNAL MEDICINE CLINIC | Age: 80
End: 2020-01-30

## 2020-01-30 ENCOUNTER — OFFICE VISIT (OUTPATIENT)
Dept: INTERNAL MEDICINE CLINIC | Age: 80
End: 2020-01-30
Payer: MEDICARE

## 2020-01-30 VITALS
SYSTOLIC BLOOD PRESSURE: 130 MMHG | HEIGHT: 66 IN | HEART RATE: 92 BPM | BODY MASS INDEX: 32.95 KG/M2 | OXYGEN SATURATION: 95 % | WEIGHT: 205 LBS | DIASTOLIC BLOOD PRESSURE: 80 MMHG

## 2020-01-30 LAB — HBA1C MFR BLD: 9.6 %

## 2020-01-30 PROCEDURE — G8482 FLU IMMUNIZE ORDER/ADMIN: HCPCS | Performed by: NURSE PRACTITIONER

## 2020-01-30 PROCEDURE — 1036F TOBACCO NON-USER: CPT | Performed by: NURSE PRACTITIONER

## 2020-01-30 PROCEDURE — 83036 HEMOGLOBIN GLYCOSYLATED A1C: CPT | Performed by: NURSE PRACTITIONER

## 2020-01-30 PROCEDURE — 4040F PNEUMOC VAC/ADMIN/RCVD: CPT | Performed by: NURSE PRACTITIONER

## 2020-01-30 PROCEDURE — 1123F ACP DISCUSS/DSCN MKR DOCD: CPT | Performed by: NURSE PRACTITIONER

## 2020-01-30 PROCEDURE — 1090F PRES/ABSN URINE INCON ASSESS: CPT | Performed by: NURSE PRACTITIONER

## 2020-01-30 PROCEDURE — G8427 DOCREV CUR MEDS BY ELIG CLIN: HCPCS | Performed by: NURSE PRACTITIONER

## 2020-01-30 PROCEDURE — G8400 PT W/DXA NO RESULTS DOC: HCPCS | Performed by: NURSE PRACTITIONER

## 2020-01-30 PROCEDURE — 99213 OFFICE O/P EST LOW 20 MIN: CPT | Performed by: NURSE PRACTITIONER

## 2020-01-30 PROCEDURE — G8417 CALC BMI ABV UP PARAM F/U: HCPCS | Performed by: NURSE PRACTITIONER

## 2020-01-30 PROCEDURE — G0439 PPPS, SUBSEQ VISIT: HCPCS | Performed by: NURSE PRACTITIONER

## 2020-01-30 PROCEDURE — 93000 ELECTROCARDIOGRAM COMPLETE: CPT | Performed by: NURSE PRACTITIONER

## 2020-01-30 RX ORDER — LISINOPRIL 20 MG/1
TABLET ORAL
Qty: 90 TABLET | Refills: 0 | Status: SHIPPED | OUTPATIENT
Start: 2020-01-30 | End: 2020-05-18

## 2020-01-30 RX ORDER — DOCUSATE SODIUM 100 MG/1
100 CAPSULE, LIQUID FILLED ORAL 2 TIMES DAILY
Qty: 60 CAPSULE | Refills: 0 | Status: SHIPPED | OUTPATIENT
Start: 2020-01-30 | End: 2020-03-24 | Stop reason: SDUPTHER

## 2020-01-30 ASSESSMENT — LIFESTYLE VARIABLES: HOW OFTEN DO YOU HAVE A DRINK CONTAINING ALCOHOL: 0

## 2020-01-30 ASSESSMENT — ENCOUNTER SYMPTOMS
BACK PAIN: 0
EYE REDNESS: 0
COUGH: 0
SINUS PRESSURE: 0
SORE THROAT: 0
WHEEZING: 0
VOMITING: 0
ABDOMINAL PAIN: 1
BLOOD IN STOOL: 0
EYES NEGATIVE: 1
NAUSEA: 0
DIARRHEA: 0
CHEST TIGHTNESS: 0
EYE ITCHING: 0
CONSTIPATION: 1
RHINORRHEA: 0
COLOR CHANGE: 0
SHORTNESS OF BREATH: 0

## 2020-01-30 ASSESSMENT — PATIENT HEALTH QUESTIONNAIRE - PHQ9
SUM OF ALL RESPONSES TO PHQ QUESTIONS 1-9: 0
SUM OF ALL RESPONSES TO PHQ QUESTIONS 1-9: 0

## 2020-01-30 NOTE — TELEPHONE ENCOUNTER
Pt's sister calling because they thought 2 meds were being phoned in today after visit but pharmacy has nothing ordered.  Pharmacy is [facility], phone [phone]

## 2020-01-30 NOTE — PROGRESS NOTES
Medicare Annual Wellness Visit  Name: Zechariah Conn Date: 2020   MRN: [id number] Sex: Female   Age: 78 y.o. Ethnicity: Non-/Non    : 1940 Race: Billy Crain is here for Medicare AWV and Other (paperwork)  she has been doing okay at [facility]. Her BG levels have been high. a1c today was 9.6. list of blood sugars sent from facility today. She states that she is not consistently getting her insulin. She states that the other day a nurse did not give her her humalog. She states that she tries to eat well. She has been taking all other medications appropriately. She does states that she has been having constipation. Sometimes going up to 2 weeks without a BM. She states that she was recently in the ER and she was discharged with Bentyl. She has a BM this AM and was small and hard. She is still having cramping. Imaging at ER showed no obstruction. Screenings for behavioral, psychosocial and functional/safety risks, and cognitive dysfunction are all negative except as indicated below. These results, as well as other patient data from the [address] form, are documented in Flowsheets linked to this Encounter. Allergies   Allergen Reactions    Lipitor [Atorvastatin]     Nickel Rash     Jewelry & foods some 20+ yrs ago, but eats everything now without return of rash    Oxycodone-Acetaminophen Nausea And Vomiting     Patient states \"oxycodone is okay\" for her to take         Prior to Visit Medications    Medication Sig Taking?  Authorizing Provider   dicyclomine (BENTYL) 20 MG tablet Take 1 tablet by mouth every 6 hours as needed (cramping or gas pain) Yes MD SARIKA VasquezUS SOLOSTAR 100 UNIT/ML injection pen ADMINISTER 34 UNITS UNDER THE SKIN EVERY NIGHT Yes Danya Her MD   mirtazapine (REMERON) 15 MG tablet TAKE 1/2 TABLET BY MOUTH EVERY NIGHT AT BEDTIME Yes Danya Her MD   lamoTRIgine (LAMICTAL) 25 MG tablet TAKE 1 TABLET BY MOUTH arthralgias, back pain, joint swelling and myalgias. Skin: Negative for color change, pallor, rash and wound. Allergic/Immunologic: Negative for environmental allergies and food allergies. Neurological: Negative for dizziness, seizures, syncope, weakness, light-headedness, numbness and headaches. Hematological: Negative for adenopathy. Does not bruise/bleed easily. Psychiatric/Behavioral: Negative for confusion, sleep disturbance and suicidal ideas. The patient is not nervous/anxious and is not hyperactive. Physical Exam  Constitutional:       Appearance: She is well-developed. HENT:      Head: Normocephalic. Right Ear: External ear normal.      Left Ear: External ear normal.   Eyes:      Conjunctiva/sclera: Conjunctivae normal.      Pupils: Pupils are equal, round, and reactive to light. Neck:      Musculoskeletal: Normal range of motion and neck supple. Vascular: No JVD. Cardiovascular:      Rate and Rhythm: Normal rate and regular rhythm. Heart sounds: No murmur. No friction rub. No gallop. Pulmonary:      Effort: Pulmonary effort is normal. No respiratory distress. Breath sounds: Normal breath sounds. No wheezing. Abdominal:      General: Bowel sounds are normal. There is no distension. Palpations: Abdomen is soft. There is no mass. Tenderness: There is abdominal tenderness (upper abdomen). There is no guarding or rebound. Musculoskeletal: Normal range of motion. General: No tenderness. Skin:     General: Skin is warm and dry. Neurological:      Mental Status: She is alert and oriented to person, place, and time. Deep Tendon Reflexes: Reflexes are normal and symmetric. Psychiatric:         Behavior: Behavior normal.           Patient's complete Health Risk Assessment and screening values have been reviewed and are found in Flowsheets.  The following problems were reviewed today and where indicated follow up appointments were made and/or referrals ordered. Positive Risk Factor Screenings with Interventions:     Depression:  Depression Unable to Assess: Functional capacity motivation limits accuracy  PHQ-2 Score: 0  PHQ-9 Total Score: 0    Health Habits/Nutrition:  Health Habits/Nutrition  Do you exercise for at least 20 minutes 2-3 times per week?: Yes  Have you lost any weight without trying in the past 3 months?: No  Do you eat fewer than 2 meals per day?: No  Have you seen a dentist within the past year?: Yes  Body mass index is 33.09 kg/m². Health Habits/Nutrition Interventions:  · Dental exam overdue:  patient encouraged to make appointment with his/her dentist    Personalized Preventive Plan   Current Health Maintenance Status  Immunization History   Administered Date(s) Administered    Influenza Virus Vaccine 10/02/2018    Influenza, Triv, inactivated, subunit, adjuvanted, IM (Fluad 65 yrs and older) 10/14/2019    Pneumococcal Conjugate 13-valent ([id number]) 11/13/2015    Pneumococcal Polysaccharide ([id number]) 04/20/2003, 07/02/2012    Td, unspecified formulation 04/20/2011    Zoster Live (Zostavax) 04/20/2007        Health Maintenance   Topic Date Due    Annual Wellness Visit (AWV)  05/29/2019    Lipid screen  08/13/2019    TSH testing  12/26/2019    Shingles Vaccine (2 of 3) 10/14/2020 (Originally 6/15/2007)    DTaP/Tdap/Td vaccine (1 - Tdap) 04/21/2021 (Originally 4/13/1951)    Potassium monitoring  01/16/2021    Creatinine monitoring  01/16/2021    Flu vaccine  Completed    Pneumococcal 65+ years Vaccine  Completed    DEXA (modify frequency per FRAX score)  Addressed     Recommendations for Preventive Services Due: see orders and patient instructions/AVS.  . Recommended screening schedule for the next 5-10 years is provided to the patient in written form: see Patient Matias David was seen today for medicare awv and other.     Diagnoses and all orders for this visit:    Type 2 diabetes mellitus treated

## 2020-01-30 NOTE — PROGRESS NOTES
2020     Milagros Bruce (:  1940) is a 78 y.o. female, here for evaluation of the following medical concerns:    HPI   Rey Martinez is here today for AWV. She does have acute concerns today. Glucose having been running \"really high\" in the AM in the 300's. Rest of day \"are fine\",  Breakfast consist of oatmeal, eggs or cereal. Eats sandwich/fruit for lunch. Dinner is salad, entree and vegetable and also most days has dessert. Says does not drink plain water, but using crystal light, has 8oz glasses 4-5 times daily. She feels like [facility] is not giving her medications as prescribed and that is why here glucose is elevated. No exercise noted. BM today, small hard stool. Can go weeks without going. No recent colonoscopy. States that she has episodes where she has cramping and is incontinent of stool and then passes out. She went to ER 2 weeks ago related to this issue where she was prescribed Bentyl. Review of Systems   Constitutional: Negative. Negative for chills, fatigue and fever. HENT: Negative. Eyes: Negative. Respiratory: Negative for cough, chest tightness, shortness of breath and wheezing. Cardiovascular: Negative. Negative for chest pain and palpitations. Gastrointestinal: Positive for abdominal pain (and cramping ) and constipation. Negative for diarrhea, nausea and vomiting. Musculoskeletal: Negative. Skin: Negative. Hematological: Negative. Psychiatric/Behavioral: Negative. Prior to Visit Medications    Medication Sig Taking?  Authorizing Provider   dicyclomine (BENTYL) 20 MG tablet Take 1 tablet by mouth every 6 hours as needed (cramping or gas pain) Yes MD IRENA Hamilton SOLGENTRY 100 UNIT/ML injection pen ADMINISTER 34 UNITS UNDER THE SKIN EVERY NIGHT Yes Mandy George MD   mirtazapine (REMERON) 15 MG tablet TAKE 1/2 TABLET BY MOUTH EVERY NIGHT AT BEDTIME Yes Mandy George MD   lamoTRIgine (LAMICTAL) 25 MG tablet TAKE 1 Estimated body mass index is 33.09 kg/m² as calculated from the following:    Height as of this encounter: 5' 6\" (1.676 m). Weight as of this encounter: 205 lb (93 kg). Physical Exam  Vitals signs and nursing note reviewed. Constitutional:       Appearance: She is well-developed and well-groomed. She is obese. HENT:      Head: Normocephalic. Right Ear: Tympanic membrane and ear canal normal.      Left Ear: Tympanic membrane and ear canal normal.      Nose: Nose normal.      Mouth/Throat:      Mouth: Mucous membranes are dry. Tongue: No lesions. Pharynx: Oropharynx is clear. Uvula midline. Tonsils: Swellin on the right. 0 on the left. Eyes:      General: Lids are normal.      Extraocular Movements: Extraocular movements intact. Conjunctiva/sclera: Conjunctivae normal.      Pupils: Pupils are equal, round, and reactive to light. Neck:      Musculoskeletal: Full passive range of motion without pain and normal range of motion. Cardiovascular:      Rate and Rhythm: Normal rate and regular rhythm. Pulses: Normal pulses. Heart sounds: Normal heart sounds. Pulmonary:      Effort: Pulmonary effort is normal.      Breath sounds: Normal breath sounds. Abdominal:      General: Bowel sounds are decreased. There is no distension. Palpations: There is no mass. Tenderness: There is generalized abdominal tenderness (in bilateral upper abdominal ). There is no rebound. Negative signs include Cuevas's sign and McBurney's sign. Hernia: No hernia is present. Musculoskeletal:      Right lower leg: No edema. Left lower leg: No edema. Skin:     General: Skin is warm and dry. Capillary Refill: Capillary refill takes less than 2 seconds. Neurological:      Mental Status: She is alert and oriented to person, place, and time. Cranial Nerves: No cranial nerve deficit. Motor: No weakness.       Gait: Gait normal.   Psychiatric:         Mood and Affect: Mood normal.         Thought Content: Thought content normal.         ASSESSMENT/PLAN:  1. Type 2 diabetes mellitus treated with insulin (HCC)  - POCT glycosylated hemoglobin (Hb A1C) increased to 9.6  Will discuss with [facility] to make sure insulin is given as prescribed. Diet/nutrition discussed, will work on cutting back on carbohydrates and desserts   2. Essential hypertension  - EKG 12 Lead WNL today. Continue taking Lisinopril 20 mg daily. 3. Constipation:  Start colace today. Increase water intake. Eating 2-3 servings of vegetables daily. Increase exercise and start walking around [facility] for 20-30 minutes 3-5 days a week. May use Bentyl 20 mg every 6 hours as needed. Follow up in 3 months. An electronic signature was used to authenticate this note.     Nani Dire on 1/30/2020 at 11:52 AM

## 2020-01-30 NOTE — ASSESSMENT & PLAN NOTE
Elevated a1c  Call to facility to clarify insulin orders  Awaiting call back   Advised patient she HAS to take insulin when it is needed  She is agreeable

## 2020-02-05 ENCOUNTER — CARE COORDINATION (OUTPATIENT)
Dept: CARE COORDINATION | Age: 80
End: 2020-02-05

## 2020-02-05 NOTE — LETTER
2/5/2020    Spike Brunson [address]! Please disregard this letter if you have already returned the voice message I left today. I was at another [facility]) office when you were in to see Camilo Grey NP January 30. Sorry we missed each other! I wanted to reach you to see if you would like me to make outreach to you, again. At your appointment last week, you had shared experiencing some missed doses of your insulin at [facility], recently, and have also been experiencing some gastrointestinal concerns. Perhaps I might be of help, in some way. I can collaborate between [facility] and your provider office - even advocate when needed. Care Coordination remains a FREE service, offering extended care team support to you, for a duration of approximately 90 days. You may graduate from the Care Coordination support sooner, as well- but if you feel you need more time, we can extend the duration of time we work together, too. Unless we have already spoken, I will continue my outreach over the next 1-2 weeks to give you an opportunity to discuss your focused health care and/or lifestyle goals. So, please be thinking of areas you feel you could use support and/or resources. We'll talk more about this when I call. I look forward to talking with you soon! I'd really like to catch up with you and talk about your thoughts about progress, or any identified challenges. In good health,        Nelson Mccarthy RN, [address] [place] [address],[address] ([facility]) [phone]; [facility]) [phone]; [phone]  Cell  [phone]    ps I'm enclosing a [address] and Accountability (HIPAA) consent  for you to complete & return in the self addressed stamped envelope. The most recent HIPAA we have on file was scanned to your chart 2. 4.2019, so we need a current one.

## 2020-02-10 SDOH — SOCIAL STABILITY: SOCIAL NETWORK: HOW OFTEN DO YOU ATTENT MEETINGS OF THE CLUB OR ORGANIZATION YOU BELONG TO?: MORE THAN 4 TIMES PER YEAR

## 2020-02-10 SDOH — ECONOMIC STABILITY: INCOME INSECURITY: HOW HARD IS IT FOR YOU TO PAY FOR THE VERY BASICS LIKE FOOD, HOUSING, MEDICAL CARE, AND HEATING?: NOT HARD AT ALL

## 2020-02-10 SDOH — ECONOMIC STABILITY: TRANSPORTATION INSECURITY
IN THE PAST 12 MONTHS, HAS LACK OF TRANSPORTATION KEPT YOU FROM MEETINGS, WORK, OR FROM GETTING THINGS NEEDED FOR DAILY LIVING?: NO

## 2020-02-10 SDOH — HEALTH STABILITY: MENTAL HEALTH
STRESS IS WHEN SOMEONE FEELS TENSE, NERVOUS, ANXIOUS, OR CAN'T SLEEP AT NIGHT BECAUSE THEIR MIND IS TROUBLED. HOW STRESSED ARE YOU?: NOT AT ALL

## 2020-02-10 SDOH — HEALTH STABILITY: PHYSICAL HEALTH: ON AVERAGE, HOW MANY MINUTES DO YOU ENGAGE IN EXERCISE AT THIS LEVEL?: 0 MIN

## 2020-02-10 SDOH — SOCIAL STABILITY: SOCIAL NETWORK
IN A TYPICAL WEEK, HOW MANY TIMES DO YOU TALK ON THE PHONE WITH FAMILY, FRIENDS, OR NEIGHBORS?: MORE THAN THREE TIMES A WEEK

## 2020-02-10 SDOH — SOCIAL STABILITY: SOCIAL NETWORK: ARE YOU MARRIED, WIDOWED, DIVORCED, SEPARATED, NEVER MARRIED, OR LIVING WITH A PARTNER?: DIVORCED

## 2020-02-10 SDOH — ECONOMIC STABILITY: FOOD INSECURITY: WITHIN THE PAST 12 MONTHS, YOU WORRIED THAT YOUR FOOD WOULD RUN OUT BEFORE YOU GOT MONEY TO BUY MORE.: NEVER TRUE

## 2020-02-10 SDOH — HEALTH STABILITY: PHYSICAL HEALTH: ON AVERAGE, HOW MANY DAYS PER WEEK DO YOU ENGAGE IN MODERATE TO STRENUOUS EXERCISE (LIKE A BRISK WALK)?: 0 DAYS

## 2020-02-10 SDOH — ECONOMIC STABILITY: FOOD INSECURITY: WITHIN THE PAST 12 MONTHS, THE FOOD YOU BOUGHT JUST DIDN'T LAST AND YOU DIDN'T HAVE MONEY TO GET MORE.: NEVER TRUE

## 2020-02-10 SDOH — SOCIAL STABILITY: SOCIAL NETWORK
DO YOU BELONG TO ANY CLUBS OR ORGANIZATIONS SUCH AS CHURCH GROUPS UNIONS, FRATERNAL OR ATHLETIC GROUPS, OR SCHOOL GROUPS?: YES

## 2020-02-10 SDOH — SOCIAL STABILITY: SOCIAL NETWORK: HOW OFTEN DO YOU ATTEND CHURCH OR RELIGIOUS SERVICES?: 1 TO 4 TIMES PER YEAR

## 2020-02-10 SDOH — ECONOMIC STABILITY: TRANSPORTATION INSECURITY
IN THE PAST 12 MONTHS, HAS THE LACK OF TRANSPORTATION KEPT YOU FROM MEDICAL APPOINTMENTS OR FROM GETTING MEDICATIONS?: NO

## 2020-02-10 SDOH — HEALTH STABILITY: MENTAL HEALTH: HOW OFTEN DO YOU HAVE A DRINK CONTAINING ALCOHOL?: NEVER

## 2020-02-10 SDOH — SOCIAL STABILITY: SOCIAL NETWORK: HOW OFTEN DO YOU GET TOGETHER WITH FRIENDS OR RELATIVES?: THREE TIMES A WEEK

## 2020-02-10 ASSESSMENT — PATIENT HEALTH QUESTIONNAIRE - PHQ9
SUM OF ALL RESPONSES TO PHQ QUESTIONS 1-9: 0
SUM OF ALL RESPONSES TO PHQ QUESTIONS 1-9: 0
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
## 2020-02-11 RX ORDER — INSULIN GLARGINE 100 [IU]/ML
INJECTION, SOLUTION SUBCUTANEOUS
Qty: 15 ML | Refills: 0 | Status: SHIPPED | OUTPATIENT
Start: 2020-02-11 | End: 2020-03-11

## 2020-02-17 RX ORDER — LEVOTHYROXINE SODIUM 0.07 MG/1
TABLET ORAL
Qty: 90 TABLET | Refills: 0 | Status: SHIPPED | OUTPATIENT
Start: 2020-02-17 | End: 2020-05-05

## 2020-02-17 RX ORDER — PRAVASTATIN SODIUM 80 MG/1
TABLET ORAL
Qty: 90 TABLET | Refills: 0 | Status: SHIPPED | OUTPATIENT
Start: 2020-02-17 | End: 2020-05-18

## 2020-02-17 RX ORDER — OMEPRAZOLE 40 MG/1
40 CAPSULE, DELAYED RELEASE ORAL DAILY
Qty: 90 CAPSULE | Refills: 0 | Status: SHIPPED | OUTPATIENT
Start: 2020-02-17 | End: 2020-05-05

## 2020-02-18 ENCOUNTER — CARE COORDINATION (OUTPATIENT)
Dept: CARE COORDINATION | Age: 80
End: 2020-02-18

## 2020-02-18 ASSESSMENT — PATIENT HEALTH QUESTIONNAIRE - PHQ9
SUM OF ALL RESPONSES TO PHQ QUESTIONS 1-9: 0
SUM OF ALL RESPONSES TO PHQ QUESTIONS 1-9: 0

## 2020-02-18 NOTE — CARE COORDINATION
Ambulatory Care Coordination Note  2/18/2020  CM Risk Score: 6  Charlson 10 Year Mortality Risk Score: 47%     ACC: Bladimir Gaitan, RN    Summary Note: states \"I'm really doing very well, thank you! \". On her way to play Bridge with resident/friends @ [facility]. BM regular, daily. No newly presenting concerns. Review of pt education; past/current. Pt verbalized understanding of above and agreement with the following  Plan: pt will continue healthy lifestyle habits, including DM mgmt and following provider recommendations. Pt will keep follow up w/PCP scheduled 4.29.20  Pt will make outreach to [employer] for support as needed. [facility] support will continue, per pt request - outreach q 2-4 weeks for ongoing health coaching & help w/any newly presenting concerns. General Assessment    Do you have any symptoms that are causing concern?:  No          Diabetes Assessment    Medic Alert ID:  Yes  Meal Planning:  Avoidance of concentrated sweets, Carb counting, Plate Method   How often do you test your blood sugar?:  Daily, Meals, Bedtime (Comment: nurse @ [facility] assists pt with DM mgmt; administers mealtime & bedtime insulins)   Do you have barriers with adherence to non-pharmacologic self-management interventions?  (Nutrition/Exercise/Self-Monitoring):  No   Have you ever had to go to the ED for symptoms of low blood sugar?:  No   What is the date of your last ED visit for low blood sugar?:  8/20/16       No patient-reported symptoms   Do you have hyperglycemia symptoms?:  No   Do you have hypoglycemia symptoms?:  No   Last Blood Sugar Value:  146   Blood Sugar Monitoring Regimen:  Morning Fasting, Before Meals   Blood Sugar Trends:  No Change (Comment: lows 100-200s; higher readings are slightly past 200, none >250)          Care Coordination Interventions    Program Enrollment:  Complex Care  Referral from Primary Care Provider:  No  Suggested Interventions and Community Resources  Diabetes Education:  Completed  Fall Risk Prevention:  Completed  Medi Set or Pill Pack:  Completed (Comment: nursing staff @ [facility] assist)  Registered Dietician:  Declined (Comment: denies need; has support of RD @ [facility])  Transportation Support:  Completed  Zone Management Tools:  Completed (Comment: DM; Right care. Right place. Right time handout)  Other Services or Interventions:  review of pt centered goals/barriers         Goals Addressed                 This Visit's Progress     Care Coordination Self Management (pt-stated)   On track     CC Self Management Goal  Patient Goal (What steps will patient take to achieve goal?): A1c <7.0; avoid acute onset episodes r/t poorly controlled DM mgmt  Patient is able to discuss self-management of condition(s):  Pt demonstrates adherence to medications  Pt demonstrates understanding of self-monitoring  Patient is able to identify Red Flags:  Alert to potential adverse drug reactions(s) or side effects and actions to take should they arise  Discuss target symptoms and actions to take should they arise  Identify problems that require immediate PCP or specialist visit  Patient demonstrates understanding of access to PCP/Specialist:  Understands about scheduling routine Follow Up appointments   Understands about sick day appointment options for worsening of symptoms/progression (Same Day, E Visits)            Prior to Admission medications    Medication Sig Start Date End Date Taking?  Authorizing Provider   pravastatin (PRAVACHOL) 80 MG tablet TAKE 1 TABLET BY MOUTH DAILY 2/17/20   Svetlana Delgado MD   levothyroxine (SYNTHROID) 75 MCG tablet TAKE 1 TABLET BY MOUTH EVERY DAY 2/17/20   Svetlana Delgado MD   omeprazole (PRILOSEC) 40 MG delayed release capsule TAKE 1 CAPSULE BY MOUTH DAILY 2/17/20   Svetlana Delgado MD   LANTUS SOLOSTAR 100 UNIT/ML injection pen ADMINISTER 34 UNITS UNDER THE SKIN EVERY NIGHT 2/11/20   Svetlana Delgado MD   lisinopril (PRINIVIL;ZESTRIL) 20 MG tablet TAKE 1 TABLET BY MOUTH DAILY 1/30/20   ANGEL Osuna CNP   docusate sodium (COLACE) 100 MG capsule Take 1 capsule by mouth 2 times daily 1/30/20 2/29/20  ANGEL Osuna CNP   dicyclomine (BENTYL) 20 MG tablet Take 1 tablet by mouth every 6 hours as needed (cramping or gas pain) 1/16/20   Camilo Dugan MD   mirtazapine (REMERON) 15 MG tablet TAKE 1/2 TABLET BY MOUTH EVERY NIGHT AT BEDTIME 1/6/20   J Luis Quigley MD   lamoTRIgine (LAMICTAL) 25 MG tablet TAKE 1 TABLET BY MOUTH DAILY 12/9/19   ANGEL Osuna CNP   traZODone (DESYREL) 50 MG tablet TAKE 1 TABLET BY MOUTH DAILY 12/9/19   ANGEL Osuna CNP   Insulin Pen Needle (NOVOFINE AUTOCOVER) 30G X 8 MM MISC Using 4 times daily per E11.9 10/28/19   J Luis Quigley MD   insulin lispro (HUMALOG) 100 UNIT/ML pen ADMINISTER 7 UNITS UNDER THE SKIN THREE TIMES DAILY WITH MEALS 10/23/19   J Luis Quigley MD   Lancets MISC Testing 4 times daily per E11.9 5/22/19   J Luis Quigley MD   mometasone (ELOCON) 0.1 % cream Apply topically daily. 4/1/19   J Luis Quigley MD   ACCU-CHEK RAVI PLUS strip TEST 2 TO 3 TIMES DAILY 4/1/19   J Luis Quigley MD   Blood Glucose Monitoring Suppl (EASY STEP GLUCOSE MONITOR) w/Device KIT Patient testing 2-3 times daily per E11.9   Patient also needs test strips and lancets. 3/29/19   J Luis Quigley MD   diphenhydrAMINE (BENADRYL) 25 MG capsule Take 25 mg by mouth every 6 hours as needed for Itching    Historical Provider, MD   citalopram (CELEXA) 10 MG tablet Take 1 tablet by mouth daily 11/10/17   ANGEL Osuna CNP   Multiple Vitamins-Minerals (THERAPEUTIC MULTIVITAMIN-MINERALS) tablet Take 1 tablet by mouth daily.     Historical Provider, MD       Future Appointments   Date Time Provider Sophy Pereira   4/29/2020  1:00 PM J Luis Quigley MD [facility]

## 2020-02-26 RX ORDER — DICYCLOMINE HCL 20 MG
TABLET ORAL
Qty: 30 TABLET | Refills: 1 | Status: SHIPPED | OUTPATIENT
Start: 2020-02-26 | End: 2020-05-06

## 2020-03-05 ENCOUNTER — OFFICE VISIT (OUTPATIENT)
Dept: INTERNAL MEDICINE CLINIC | Age: 80
End: 2020-03-05
Payer: MEDICARE

## 2020-03-05 ENCOUNTER — TELEPHONE (OUTPATIENT)
Dept: INTERNAL MEDICINE CLINIC | Age: 80
End: 2020-03-05

## 2020-03-05 VITALS
DIASTOLIC BLOOD PRESSURE: 78 MMHG | HEART RATE: 69 BPM | OXYGEN SATURATION: 96 % | SYSTOLIC BLOOD PRESSURE: 124 MMHG | WEIGHT: 200 LBS | BODY MASS INDEX: 32.28 KG/M2

## 2020-03-05 DIAGNOSIS — Z79.4 TYPE 2 DIABETES MELLITUS TREATED WITH INSULIN (HCC): ICD-10-CM

## 2020-03-05 DIAGNOSIS — E11.9 TYPE 2 DIABETES MELLITUS TREATED WITH INSULIN (HCC): ICD-10-CM

## 2020-03-05 DIAGNOSIS — R10.32 LEFT LOWER QUADRANT ABDOMINAL PAIN: ICD-10-CM

## 2020-03-05 LAB
A/G RATIO: 1.7 (ref 1.1–2.2)
ALBUMIN SERPL-MCNC: 4.3 G/DL (ref 3.4–5)
ALP BLD-CCNC: 105 U/L (ref 40–129)
ALT SERPL-CCNC: 21 U/L (ref 10–40)
AMYLASE: 44 U/L (ref 25–115)
ANION GAP SERPL CALCULATED.3IONS-SCNC: 15 MMOL/L (ref 3–16)
AST SERPL-CCNC: 21 U/L (ref 15–37)
BILIRUB SERPL-MCNC: 0.3 MG/DL (ref 0–1)
BILIRUBIN DIRECT: <0.2 MG/DL (ref 0–0.3)
BILIRUBIN, INDIRECT: NORMAL MG/DL (ref 0–1)
BUN BLDV-MCNC: 20 MG/DL (ref 7–20)
CALCIUM SERPL-MCNC: 9.1 MG/DL (ref 8.3–10.6)
CHLORIDE BLD-SCNC: 105 MMOL/L (ref 99–110)
CO2: 21 MMOL/L (ref 21–32)
CREAT SERPL-MCNC: 1.4 MG/DL (ref 0.6–1.2)
GFR AFRICAN AMERICAN: 44
GFR NON-AFRICAN AMERICAN: 36
GLOBULIN: 2.6 G/DL
GLUCOSE BLD-MCNC: 98 MG/DL (ref 70–99)
HCT VFR BLD CALC: 38.2 % (ref 36–48)
HEMOGLOBIN: 13 G/DL (ref 12–16)
LIPASE: 21 U/L (ref 13–60)
MCH RBC QN AUTO: 32.4 PG (ref 26–34)
MCHC RBC AUTO-ENTMCNC: 34 G/DL (ref 31–36)
MCV RBC AUTO: 95.2 FL (ref 80–100)
PDW BLD-RTO: 13.9 % (ref 12.4–15.4)
PLATELET # BLD: 268 K/UL (ref 135–450)
PMV BLD AUTO: 8.5 FL (ref 5–10.5)
POTASSIUM SERPL-SCNC: 4.4 MMOL/L (ref 3.5–5.1)
RBC # BLD: 4.02 M/UL (ref 4–5.2)
SODIUM BLD-SCNC: 141 MMOL/L (ref 136–145)
TOTAL PROTEIN: 6.9 G/DL (ref 6.4–8.2)
WBC # BLD: 8 K/UL (ref 4–11)

## 2020-03-05 PROCEDURE — 4040F PNEUMOC VAC/ADMIN/RCVD: CPT | Performed by: NURSE PRACTITIONER

## 2020-03-05 PROCEDURE — 99214 OFFICE O/P EST MOD 30 MIN: CPT | Performed by: NURSE PRACTITIONER

## 2020-03-05 PROCEDURE — 1090F PRES/ABSN URINE INCON ASSESS: CPT | Performed by: NURSE PRACTITIONER

## 2020-03-05 PROCEDURE — G8482 FLU IMMUNIZE ORDER/ADMIN: HCPCS | Performed by: NURSE PRACTITIONER

## 2020-03-05 PROCEDURE — 1123F ACP DISCUSS/DSCN MKR DOCD: CPT | Performed by: NURSE PRACTITIONER

## 2020-03-05 PROCEDURE — G8427 DOCREV CUR MEDS BY ELIG CLIN: HCPCS | Performed by: NURSE PRACTITIONER

## 2020-03-05 PROCEDURE — 1036F TOBACCO NON-USER: CPT | Performed by: NURSE PRACTITIONER

## 2020-03-05 PROCEDURE — G8400 PT W/DXA NO RESULTS DOC: HCPCS | Performed by: NURSE PRACTITIONER

## 2020-03-05 PROCEDURE — G8417 CALC BMI ABV UP PARAM F/U: HCPCS | Performed by: NURSE PRACTITIONER

## 2020-03-05 ASSESSMENT — ENCOUNTER SYMPTOMS
RHINORRHEA: 0
NAUSEA: 0
SINUS PRESSURE: 0
EYE REDNESS: 0
SORE THROAT: 0
WHEEZING: 0
EYE ITCHING: 0
DIARRHEA: 0
COUGH: 0
CONSTIPATION: 0
BLOOD IN STOOL: 0
VOMITING: 0
ABDOMINAL PAIN: 1
BACK PAIN: 0
CHEST TIGHTNESS: 0
COLOR CHANGE: 0
SHORTNESS OF BREATH: 0

## 2020-03-05 ASSESSMENT — PATIENT HEALTH QUESTIONNAIRE - PHQ9
1. LITTLE INTEREST OR PLEASURE IN DOING THINGS: 0
2. FEELING DOWN, DEPRESSED OR HOPELESS: 0
SUM OF ALL RESPONSES TO PHQ QUESTIONS 1-9: 0
SUM OF ALL RESPONSES TO PHQ9 QUESTIONS 1 & 2: 0
SUM OF ALL RESPONSES TO PHQ QUESTIONS 1-9: 0

## 2020-03-05 NOTE — ASSESSMENT & PLAN NOTE
Finger stick in office 105   She is not having acute abdominal pain in office  Will check labs  Check imaging  Further recs pending

## 2020-03-05 NOTE — PROGRESS NOTES
Subjective:      Patient ID: Martita Turner is a 78 y.o. female. Chief Complaint   Patient presents with    Abdominal Pain    Diarrhea       HPI  In the office today with abdominal pain, diarrhea, and vomiting that occurred yesterday. This is the 4th time in the past year. She is not having any fevers. She has not had a normal BM yesterday, NO BM today. She is not having any nausea today. She has not taken anything for her symptoms. She has been taking colace which helps her constipation. She did not take imodium. She states that her blood sugar this morning was 239. Yesterday it was around 156 at bed time. She states that it was not elevated over 300. She has also been having belching since yesterday. Today she had scrambled eggs and crackers. She is having lower abdominal pain. She is not having any urinary complaints. Review of Systems   Constitutional: Negative for chills, fatigue and fever. HENT: Negative for congestion, ear pain, postnasal drip, rhinorrhea, sinus pressure, sneezing and sore throat. Eyes: Negative for redness and itching. Respiratory: Negative for cough, chest tightness, shortness of breath and wheezing. Cardiovascular: Negative for chest pain and palpitations. Gastrointestinal: Positive for abdominal pain (llq). Negative for blood in stool, constipation, diarrhea, nausea and vomiting. Endocrine: Negative for cold intolerance and heat intolerance. Genitourinary: Negative for difficulty urinating, dysuria, flank pain, frequency, hematuria and urgency. Musculoskeletal: Negative for arthralgias, back pain, joint swelling and myalgias. Skin: Negative for color change, pallor, rash and wound. Allergic/Immunologic: Negative for environmental allergies and food allergies. Neurological: Negative for dizziness, seizures, syncope, weakness, light-headedness, numbness and headaches. Hematological: Negative for adenopathy. Does not bruise/bleed easily.

## 2020-03-06 ENCOUNTER — CARE COORDINATION (OUTPATIENT)
Dept: CARE COORDINATION | Age: 80
End: 2020-03-06

## 2020-03-06 LAB
ESTIMATED AVERAGE GLUCOSE: 223.1 MG/DL
HBA1C MFR BLD: 9.4 %

## 2020-03-09 NOTE — CARE COORDINATION
Ambulatory Care Coordination Note  3/9/2020  CM Risk Score: 6  Charlson 10 Year Mortality Risk Score: 47%     ACC: Sonja Luna, RN    Summary Note: blood sugars continue to fluctuate. Review of diet suggests few exceptions when pt observes carb-controlled diet/meal choices. Instances pt describes, when deviating from carb-controlled diet, does not fully explain elevated spikes in blood sugar. Nursing @ [facility] continues to provide med mgmt service, including adm of insulin. Pt denies inconsistencies, with only isolated exceptions, which pt  - surrounding   Important to further note, pt has been found a reliable historian to this , to date. Discussed potential to further review w/RD, CDE 1:1, along w/pt's sister, Manuelito Park - who often supports pt re medical appts. Reviewed potential to discuss w/PCP about introducing GLP-1. Pt is having diagnostic testing this week for recurring, occasional/intermittent abdominal LLQ pain. Pt agrees w/ACM, advisable to await discussion w/PCP about potential to add GLP-1 @ follow up visit scheduled 4.29.20. Other: ACM reviewed pt psychosocial status. I can't drive and my friend who took me to [facility] every Thursday says she can't drive me anymore. But, I just set myself up playing games here ([facility]) each night. I am doing well, really. \"  Denies feeling down, depressed, or hopeless. Reviewed self mgmt concepts, use of after hours' provider line, s/s to report, when to notify physician.   Pt verbalized understanding of above and agreement with the following  Plan: referral to RD for 1:1 nutritional counseling and discuss self mgmt DM- pt would like to meet following PCP visit 4.29.20   Review w/PCP potential to introduce GLP-1 to help reduce A1c       Diabetes Assessment    Medic Alert ID:  Yes  Meal Planning:  Avoidance of concentrated sweets, Carb counting, Plate Method   How often do you test your blood sugar?:  Daily, Meals, Bedtime (Comment: nurse @ [facility] assists pt with DM mgmt; administers mealtime & bedtime insulins)   Do you have barriers with adherence to non-pharmacologic self-management interventions? (Nutrition/Exercise/Self-Monitoring):  No   Have you ever had to go to the ED for symptoms of low blood sugar?:  No   What is the date of your last ED visit for low blood sugar?:  8/20/16       Other symptoms causing concern   Do you have hyperglycemia symptoms?:  No   Do you have hypoglycemia symptoms?:  No   Last Blood Sugar Value:  359   Blood Sugar Monitoring Regimen:  Before Meals, Morning Fasting, At Bedtime   Blood Sugar Trends:  Fluctuating (Comment: 105-high 200s; occasional outliers in 300s; eg today (3.9.20) 359 a.m fasting)          Care Coordination Interventions    Program Enrollment:  Complex Care  Referral from Primary Care Provider:  No  Suggested Interventions and Community Resources  Diabetes Education:  Completed  Fall Risk Prevention:  Completed  Medi Set or Pill Pack:  Completed (Comment: nursing staff @ [facility] assist)  Registered Dietician:  Declined (Comment: denies need; has support of RD @ [facility])  Transportation Support:  Completed  Zone Management Tools:  Completed (Comment: DM; Right care. Right place.  Right time handout)  Other Services or Interventions:  review of pt centered goals/barriers         Goals Addressed                 This Visit's Progress     Care Coordination Self Management (pt-stated)   On track     CC Self Management Goal  Patient Goal (What steps will patient take to achieve goal?): A1c <7.0; avoid acute onset episodes r/t poorly controlled DM mgmt  Patient is able to discuss self-management of condition(s):  Pt demonstrates adherence to medications  Pt demonstrates understanding of self-monitoring  Patient is able to identify Red Flags:  Alert to potential adverse drug reactions(s) or side effects and actions to take should they arise  Discuss target symptoms and actions to take should they arise  Identify problems that require immediate PCP or specialist visit  Patient demonstrates understanding of access to PCP/Specialist:  Understands about scheduling routine Follow Up appointments   Understands about sick day appointment options for worsening of symptoms/progression (Same Day, E Visits)            Prior to Admission medications    Medication Sig Start Date End Date Taking? Authorizing Provider   dicyclomine (BENTYL) 20 MG tablet TAKE 1 TABLET BY MOUTH DAILY 2/26/20   Sánchez Holloway MD   pravastatin (PRAVACHOL) 80 MG tablet TAKE 1 TABLET BY MOUTH DAILY 2/17/20   Sánchez Holloway MD   levothyroxine (SYNTHROID) 75 MCG tablet TAKE 1 TABLET BY MOUTH EVERY DAY 2/17/20   Sánchez Holloway MD   omeprazole (PRILOSEC) 40 MG delayed release capsule TAKE 1 CAPSULE BY MOUTH DAILY 2/17/20   Sánchez Holloway MD   LANTUS SOLOSTAR 100 UNIT/ML injection pen ADMINISTER 34 UNITS UNDER THE SKIN EVERY NIGHT 2/11/20   Sánchez Holloway MD   lisinopril (PRINIVIL;ZESTRIL) 20 MG tablet TAKE 1 TABLET BY MOUTH DAILY 1/30/20   ANGEL Omalley CNP   mirtazapine (REMERON) 15 MG tablet TAKE 1/2 TABLET BY MOUTH EVERY NIGHT AT BEDTIME 1/6/20   Sánchez Holloway MD   lamoTRIgine (LAMICTAL) 25 MG tablet TAKE 1 TABLET BY MOUTH DAILY 12/9/19   ANGEL Omalley CNP   traZODone (DESYREL) 50 MG tablet TAKE 1 TABLET BY MOUTH DAILY 12/9/19   ANGEL Omalley CNP   Insulin Pen Needle (NOVOFINE AUTOCOVER) 30G X 8 MM MISC Using 4 times daily per E11.9 10/28/19   Sánchez Holloway MD   insulin lispro (HUMALOG) 100 UNIT/ML pen ADMINISTER 7 UNITS UNDER THE SKIN THREE TIMES DAILY WITH MEALS 10/23/19   Sánchez Holloway MD   Lancets MISC Testing 4 times daily per E11.9 5/22/19   Sánchez Holloway MD   mometasone (ELOCON) 0.1 % cream Apply topically daily.  4/1/19   Sánchez Holloway MD   ACCU-CHEK RAVI PLUS strip TEST 2 TO 3 TIMES DAILY 4/1/19   Sánchez Holloway MD   Blood Glucose Monitoring Suppl (EASY STEP GLUCOSE MONITOR) w/Device KIT Patient testing 2-3 times daily per E11.9   Patient also needs test strips and lancets. 3/29/19   Jami Kern MD   diphenhydrAMINE (BENADRYL) 25 MG capsule Take 25 mg by mouth every 6 hours as needed for Itching    Historical Provider, MD   citalopram (CELEXA) 10 MG tablet Take 1 tablet by mouth daily 11/10/17   Rafael Men, APRN - CNP   Multiple Vitamins-Minerals (THERAPEUTIC MULTIVITAMIN-MINERALS) tablet Take 1 tablet by mouth daily.     Historical Provider, MD       Future Appointments   Date Time Provider Sophy Pereira   3/11/2020 11:00 AM [facility] CT RM 1 [facility] [employer]   4/29/2020  1:00 PM Jami Kern MD [facility] [id number]  [facility]

## 2020-03-11 ENCOUNTER — HOSPITAL ENCOUNTER (OUTPATIENT)
Dept: CT IMAGING | Age: 80
Discharge: HOME | End: 2020-03-11
Payer: MEDICARE

## 2020-03-11 PROCEDURE — 74177 CT ABD & PELVIS W/CONTRAST: CPT

## 2020-03-11 PROCEDURE — 6360000004 HC RX CONTRAST MEDICATION: Performed by: NURSE PRACTITIONER

## 2020-03-11 RX ORDER — INSULIN GLARGINE 100 [IU]/ML
INJECTION, SOLUTION SUBCUTANEOUS
Qty: 15 ML | Refills: 0 | Status: SHIPPED | OUTPATIENT
Start: 2020-03-11 | End: 2020-04-23

## 2020-03-11 RX ADMIN — IOPAMIDOL 80 ML: 755 INJECTION, SOLUTION INTRAVENOUS at 11:29

## 2020-03-11 RX ADMIN — IOHEXOL 50 ML: 240 INJECTION, SOLUTION INTRATHECAL; INTRAVASCULAR; INTRAVENOUS; ORAL at 11:30

## 2020-03-12 RX ORDER — TRAZODONE HYDROCHLORIDE 50 MG/1
TABLET ORAL
Qty: 90 TABLET | Refills: 0 | Status: SHIPPED | OUTPATIENT
Start: 2020-03-12 | End: 2020-06-11

## 2020-03-12 RX ORDER — MIRTAZAPINE 15 MG/1
TABLET, FILM COATED ORAL
Qty: 45 TABLET | Refills: 0 | Status: SHIPPED | OUTPATIENT
Start: 2020-03-12 | End: 2020-05-18

## 2020-03-24 RX ORDER — LAMOTRIGINE 25 MG/1
25 TABLET ORAL DAILY
Qty: 90 TABLET | Refills: 0 | Status: SHIPPED | OUTPATIENT
Start: 2020-03-24 | End: 2020-05-18

## 2020-03-24 RX ORDER — DOCUSATE SODIUM 100 MG/1
100 CAPSULE, LIQUID FILLED ORAL 2 TIMES DAILY
Qty: 60 CAPSULE | Refills: 0 | Status: SHIPPED | OUTPATIENT
Start: 2020-03-24 | End: 2020-05-18

## 2020-04-03 RX ORDER — INSULIN LISPRO 100 [IU]/ML
INJECTION, SOLUTION INTRAVENOUS; SUBCUTANEOUS
Qty: 18 ML | Refills: 1 | Status: SHIPPED | OUTPATIENT
Start: 2020-04-03 | End: 2020-06-15

## 2020-04-23 RX ORDER — INSULIN GLARGINE 100 [IU]/ML
INJECTION, SOLUTION SUBCUTANEOUS
Qty: 15 ML | Refills: 0 | Status: SHIPPED | OUTPATIENT
Start: 2020-04-23 | End: 2020-05-26

## 2020-05-05 RX ORDER — LEVOTHYROXINE SODIUM 0.07 MG/1
TABLET ORAL
Qty: 90 TABLET | Refills: 0 | Status: SHIPPED | OUTPATIENT
Start: 2020-05-05 | End: 2020-06-17

## 2020-05-05 RX ORDER — OMEPRAZOLE 40 MG/1
40 CAPSULE, DELAYED RELEASE ORAL DAILY
Qty: 90 CAPSULE | Refills: 0 | Status: SHIPPED | OUTPATIENT
Start: 2020-05-05 | End: 2020-09-23 | Stop reason: SDUPTHER

## 2020-05-06 RX ORDER — DICYCLOMINE HCL 20 MG
TABLET ORAL
Qty: 30 TABLET | Refills: 1 | Status: SHIPPED | OUTPATIENT
Start: 2020-05-06 | End: 2020-06-29

## 2020-05-18 RX ORDER — LISINOPRIL 20 MG/1
TABLET ORAL
Qty: 30 TABLET | Refills: 0 | Status: SHIPPED | OUTPATIENT
Start: 2020-05-18 | End: 2020-05-18

## 2020-05-18 RX ORDER — LAMOTRIGINE 25 MG/1
25 TABLET ORAL DAILY
Qty: 90 TABLET | Refills: 0 | Status: SHIPPED | OUTPATIENT
Start: 2020-05-18 | End: 2020-09-23 | Stop reason: SDUPTHER

## 2020-05-18 RX ORDER — LISINOPRIL 20 MG/1
TABLET ORAL
Qty: 90 TABLET | Refills: 0 | Status: SHIPPED | OUTPATIENT
Start: 2020-05-18 | End: 2020-08-24

## 2020-05-18 RX ORDER — MIRTAZAPINE 15 MG/1
TABLET, FILM COATED ORAL
Qty: 45 TABLET | Refills: 0 | Status: SHIPPED | OUTPATIENT
Start: 2020-05-18 | End: 2020-07-21

## 2020-05-18 RX ORDER — PRAVASTATIN SODIUM 80 MG/1
TABLET ORAL
Qty: 90 TABLET | Refills: 0 | Status: SHIPPED | OUTPATIENT
Start: 2020-05-18 | End: 2020-08-31

## 2020-05-18 RX ORDER — DOCUSATE SODIUM 100 MG
CAPSULE ORAL
Qty: 60 CAPSULE | Refills: 0 | Status: SHIPPED | OUTPATIENT
Start: 2020-05-18 | End: 2020-07-27

## 2020-05-19 ENCOUNTER — VIRTUAL VISIT (OUTPATIENT)
Dept: INTERNAL MEDICINE CLINIC | Age: 80
End: 2020-05-19
Payer: MEDICARE

## 2020-05-19 PROCEDURE — 99442 PR PHYS/QHP TELEPHONE EVALUATION 11-20 MIN: CPT | Performed by: INTERNAL MEDICINE

## 2020-05-19 NOTE — PROGRESS NOTES
Greta Madden is a [de-identified] y.o. female evaluated via telephone on 5/19/2020. Consent:  She and/or health care decision maker is aware that that she may receive a bill for this telephone service, depending on her insurance coverage, and has provided verbal consent to proceed: Yes    Prior to Visit Medications    Medication Sig Taking? Authorizing Provider   pravastatin (PRAVACHOL) 80 MG tablet TAKE 1 TABLET BY MOUTH DAILY  Lalita Seymour MD   mirtazapine (REMERON) 15 MG tablet TAKE 1/2 TABLET BY MOUTH EVERY NIGHT AT BEDTIME  Lalita Seymour MD   lamoTRIgine (LAMICTAL) 25 MG tablet TAKE 1 TABLET BY MOUTH DAILY  Lalita Seymour MD   STOOL SOFTENER 100 MG capsule TAKE 1 CAPSULE BY MOUTH TWICE DAILY  Lalita Seymour MD   lisinopril (PRINIVIL;ZESTRIL) 20 MG tablet TAKE 1 TABLET BY MOUTH DAILY  Lalita Seymour MD   dicyclomine (BENTYL) 20 MG tablet TAKE 1 TABLET BY MOUTH DAILY  Lalita Seymour MD   levothyroxine (SYNTHROID) 75 MCG tablet TAKE 1 TABLET BY MOUTH EVERY DAY  Lalita Seymour MD   omeprazole (PRILOSEC) 40 MG delayed release capsule TAKE 1 CAPSULE BY MOUTH DAILY  Lalita Seymour MD   LANTUS SOLOSTAR 100 UNIT/ML injection pen ADMINISTER 34 UNITS UNDER THE SKIN EVERY NIGHT  Lalita Seymour MD   Insulin Pen Needle (Sneha Zuñigaini) 30G X 8 MM MISC USE FOUR TIMES DAILY  Lalita Seymour MD   insulin lispro, 1 Unit Dial, (HUMALOG KWIKPEN) 100 UNIT/ML SOPN ADMINISTER 7 UNITS UNDER THE SKIN THREE TIMES DAILY WITH MEALS  Lalita Seymour MD   traZODone (DESYREL) 50 MG tablet One tablet daily by mouth  ANGEL Bateman - CNP   Lancets MISC Testing 4 times daily per E11.9  Lalita Seymour MD   mometasone (ELOCON) 0.1 % cream Apply topically daily.   Lalita Seymour MD   ACCU-CHEK RAVI PLUS strip TEST 2 TO 3 TIMES DAILY  Lalita Seymour MD   Blood Glucose Monitoring Suppl (EASY STEP GLUCOSE MONITOR) w/Device KIT Patient testing 2-3 times daily per E11.9 stable  Continue present treatment        I affirm this is a Patient Initiated Episode with an Established Patient who has not had a related appointment within my department in the past 7 days or scheduled within the next 24 hours.     Total Time: minutes: 11-20 minutes    Note: not billable if this call serves to triage the patient into an appointment for the relevant concern      Ayesha Kaye

## 2020-05-26 RX ORDER — INSULIN GLARGINE 100 [IU]/ML
INJECTION, SOLUTION SUBCUTANEOUS
Qty: 15 ML | Refills: 0 | Status: SHIPPED | OUTPATIENT
Start: 2020-05-26 | End: 2020-07-06

## 2020-05-28 DIAGNOSIS — Z79.4 TYPE 2 DIABETES MELLITUS TREATED WITH INSULIN (HCC): ICD-10-CM

## 2020-05-28 DIAGNOSIS — I10 ESSENTIAL HYPERTENSION: ICD-10-CM

## 2020-05-28 DIAGNOSIS — E03.4 HYPOTHYROIDISM DUE TO ACQUIRED ATROPHY OF THYROID: ICD-10-CM

## 2020-05-28 DIAGNOSIS — F33.1 MAJOR DEPRESSIVE DISORDER, RECURRENT, MODERATE (HCC): ICD-10-CM

## 2020-05-28 DIAGNOSIS — E11.9 TYPE 2 DIABETES MELLITUS TREATED WITH INSULIN (HCC): ICD-10-CM

## 2020-05-28 LAB
A/G RATIO: 1.5 (ref 1.1–2.2)
ALBUMIN SERPL-MCNC: 4.1 G/DL (ref 3.4–5)
ALP BLD-CCNC: 108 U/L (ref 40–129)
ALT SERPL-CCNC: 15 U/L (ref 10–40)
ANION GAP SERPL CALCULATED.3IONS-SCNC: 13 MMOL/L (ref 3–16)
AST SERPL-CCNC: 17 U/L (ref 15–37)
BASOPHILS ABSOLUTE: 0 K/UL (ref 0–0.2)
BASOPHILS RELATIVE PERCENT: 0.6 %
BILIRUB SERPL-MCNC: 0.3 MG/DL (ref 0–1)
BILIRUBIN URINE: NEGATIVE
BLOOD, URINE: NEGATIVE
BUN BLDV-MCNC: 22 MG/DL (ref 7–20)
CALCIUM SERPL-MCNC: 8.8 MG/DL (ref 8.3–10.6)
CHLORIDE BLD-SCNC: 105 MMOL/L (ref 99–110)
CLARITY: CLEAR
CO2: 21 MMOL/L (ref 21–32)
COLOR: YELLOW
CREAT SERPL-MCNC: 1.4 MG/DL (ref 0.6–1.2)
CREATININE URINE: 188.2 MG/DL (ref 28–259)
EOSINOPHILS ABSOLUTE: 0.1 K/UL (ref 0–0.6)
EOSINOPHILS RELATIVE PERCENT: 2.6 %
EPITHELIAL CELLS, UA: 2 /HPF (ref 0–5)
GFR AFRICAN AMERICAN: 44
GFR NON-AFRICAN AMERICAN: 36
GLOBULIN: 2.7 G/DL
GLUCOSE BLD-MCNC: 297 MG/DL (ref 70–99)
GLUCOSE URINE: 250 MG/DL
HCT VFR BLD CALC: 41.4 % (ref 36–48)
HEMOGLOBIN: 13.6 G/DL (ref 12–16)
HYALINE CASTS: 4 /LPF (ref 0–8)
KETONES, URINE: NEGATIVE MG/DL
LEUKOCYTE ESTERASE, URINE: ABNORMAL
LYMPHOCYTES ABSOLUTE: 1.6 K/UL (ref 1–5.1)
LYMPHOCYTES RELATIVE PERCENT: 30.4 %
MCH RBC QN AUTO: 32 PG (ref 26–34)
MCHC RBC AUTO-ENTMCNC: 32.9 G/DL (ref 31–36)
MCV RBC AUTO: 97.2 FL (ref 80–100)
MICROALBUMIN UR-MCNC: 21.1 MG/DL
MICROALBUMIN/CREAT UR-RTO: 112.1 MG/G (ref 0–30)
MICROSCOPIC EXAMINATION: YES
MONOCYTES ABSOLUTE: 0.4 K/UL (ref 0–1.3)
MONOCYTES RELATIVE PERCENT: 7.6 %
NEUTROPHILS ABSOLUTE: 3.1 K/UL (ref 1.7–7.7)
NEUTROPHILS RELATIVE PERCENT: 58.8 %
NITRITE, URINE: NEGATIVE
PDW BLD-RTO: 14.6 % (ref 12.4–15.4)
PH UA: 6 (ref 5–8)
PLATELET # BLD: 257 K/UL (ref 135–450)
PMV BLD AUTO: 8.6 FL (ref 5–10.5)
POTASSIUM SERPL-SCNC: 4.7 MMOL/L (ref 3.5–5.1)
PROTEIN UA: 30 MG/DL
RBC # BLD: 4.26 M/UL (ref 4–5.2)
RBC UA: 1 /HPF (ref 0–4)
SODIUM BLD-SCNC: 139 MMOL/L (ref 136–145)
SPECIFIC GRAVITY UA: 1.02 (ref 1–1.03)
TOTAL PROTEIN: 6.8 G/DL (ref 6.4–8.2)
TSH SERPL DL<=0.05 MIU/L-ACNC: 17.96 UIU/ML (ref 0.27–4.2)
URINE TYPE: ABNORMAL
UROBILINOGEN, URINE: 0.2 E.U./DL
WBC # BLD: 5.3 K/UL (ref 4–11)
WBC UA: 35 /HPF (ref 0–5)

## 2020-05-29 LAB
ESTIMATED AVERAGE GLUCOSE: 200.1 MG/DL
HBA1C MFR BLD: 8.6 %

## 2020-06-10 RX ORDER — LANCETS 23 GAUGE
EACH MISCELLANEOUS
Qty: 400 EACH | Refills: 1 | Status: SHIPPED | OUTPATIENT
Start: 2020-06-10

## 2020-06-10 RX ORDER — LANCETS 23 GAUGE
EACH MISCELLANEOUS
Qty: 400 EACH | Refills: 1 | Status: SHIPPED | OUTPATIENT
Start: 2020-06-10 | End: 2020-06-10

## 2020-06-11 RX ORDER — TRAZODONE HYDROCHLORIDE 50 MG/1
TABLET ORAL
Qty: 90 TABLET | Refills: 0 | Status: SHIPPED | OUTPATIENT
Start: 2020-06-11 | End: 2020-09-10

## 2020-06-15 RX ORDER — INSULIN LISPRO 100 [IU]/ML
INJECTION, SOLUTION INTRAVENOUS; SUBCUTANEOUS
Qty: 18 ML | Refills: 1 | Status: SHIPPED | OUTPATIENT
Start: 2020-06-15 | End: 2020-11-13 | Stop reason: DRUGHIGH

## 2020-06-17 RX ORDER — LEVOTHYROXINE SODIUM 0.1 MG/1
100 TABLET ORAL DAILY
Qty: 90 TABLET | Refills: 0 | Status: SHIPPED | OUTPATIENT
Start: 2020-06-17

## 2020-06-19 ENCOUNTER — TELEPHONE (OUTPATIENT)
Dept: INTERNAL MEDICINE CLINIC | Age: 80
End: 2020-06-19

## 2020-06-29 RX ORDER — DICYCLOMINE HCL 20 MG
TABLET ORAL
Qty: 30 TABLET | Refills: 1 | Status: SHIPPED | OUTPATIENT
Start: 2020-06-29 | End: 2020-08-31

## 2020-07-06 RX ORDER — INSULIN GLARGINE 100 [IU]/ML
INJECTION, SOLUTION SUBCUTANEOUS
Qty: 15 ML | Refills: 0 | Status: SHIPPED | OUTPATIENT
Start: 2020-07-06 | End: 2020-07-21

## 2020-07-17 ENCOUNTER — CARE COORDINATION (OUTPATIENT)
Dept: CARE COORDINATION | Age: 80
End: 2020-07-17

## 2020-07-17 NOTE — CARE COORDINATION
Ambulatory Care Coordination Note  7/17/2020  CM Risk Score: 6  Charlson 10 Year Mortality Risk Score: 79%     ACC: Shelly Fleischer, RN    Summary Note: following Covid precautions @ [facility]. Nursing staff continues to assist w/med mgmt. Still experiences occasional episodic hyper/hypoglycemia, but adds with nursing staff assistance, she feels she is 'doing pretty well'. Reviewed need to extend delay for 1:1 w/RD d/t [facility] team continues working remotely and RD unavailable for face to face. Pt denies need to meet w/RD for 1:1 adding that she believes she has lost approx 10 lbs, which was her intention. She states she was nearly 220 lbs. She has cut out starchy carbs such as breads, pasta - as much as possible. Pt is scheduled for follow up w/PCP 8.26.20  She anticipates likely to have labs done two weeks prior to this and states intention to make outreach direct to provider office, closer to appt date to inquire if any labs will be ordered and review method of follow up - phone or in office. Pt verbalized understanding of above and agreement with the following  Plan: in absence of any newly presenting concerns or needs -  Graduate from [facility] episode. Pt will make outreach for support in future, as needed. Diabetes Assessment    Medic Alert ID:  Yes  Meal Planning:  Avoidance of concentrated sweets, Carb counting, Plate Method   How often do you test your blood sugar?:  Daily, Meals, Bedtime (Comment: nurse @ [facility] assists pt with DM mgmt; administers mealtime & bedtime insulins)   Do you have barriers with adherence to non-pharmacologic self-management interventions?  (Nutrition/Exercise/Self-Monitoring):  No   Have you ever had to go to the ED for symptoms of low blood sugar?:  No   What is the date of your last ED visit for low blood sugar?:  8/20/16       No patient-reported symptoms   Do you have hyperglycemia symptoms?:  No   Do you have hypoglycemia symptoms?:  No   Blood Sugar Monitoring Regimen:  Morning Fasting, Before Meals, At Bedtime   Blood Sugar Trends:  Fluctuating (Comment: nursing staff monitors blood sugars)          Care Coordination Interventions    Program Enrollment:  Complex Care  Referral from Primary Care Provider:  No  Suggested Interventions and Community Resources  Diabetes Education:  Completed  Fall Risk Prevention:  Completed  Medi Set or Pill Pack:  Completed (Comment: nursing staff @ [facility] assist)  Registered Dietician:  Declined (Comment: denies need; has support of RD @ [facility])  Transportation Support:  Completed  Zone Management Tools:  Completed (Comment: DM; Right care. Right place. Right time handout)  Other Services or Interventions:  review of pt centered goals/barriers         Goals Addressed                 This Visit's Progress     COMPLETED: Care Coordination Self Management (pt-stated)   On track     CC Self Management Goal  Patient Goal (What steps will patient take to achieve goal?): A1c <7.0; avoid acute onset episodes r/t poorly controlled DM mgmt  Patient is able to discuss self-management of condition(s):  Pt demonstrates adherence to medications  Pt demonstrates understanding of self-monitoring  Patient is able to identify Red Flags:  Alert to potential adverse drug reactions(s) or side effects and actions to take should they arise  Discuss target symptoms and actions to take should they arise  Identify problems that require immediate PCP or specialist visit  Patient demonstrates understanding of access to PCP/Specialist:  Understands about scheduling routine Follow Up appointments   Understands about sick day appointment options for worsening of symptoms/progression (Same Day, E Visits)            Prior to Admission medications    Medication Sig Start Date End Date Taking?  Authorizing Provider   LANGILL SOLOSTAR 100 UNIT/ML injection pen ADMINISTER 34 UNITS UNDER THE SKIN EVERY NIGHT 7/6/20   Chantale Olivera MD   dicyclomine (BENTYL) 20 MG tablet TAKE 1 TABLET BY MOUTH DAILY 6/29/20   Ian Alonzo MD   levothyroxine (SYNTHROID) 100 MCG tablet Take 1 tablet by mouth Daily 6/17/20   Ian Alonzo MD   insulin lispro, 1 Unit Dial, 100 UNIT/ML SOPN ADMINISTER 7 UNITS UNDER THE SKIN THREE TIMES DAILY WITH MEALS 6/15/20   Ian Alonzo MD   traZODone (DESYREL) 50 MG tablet TAKE 1 TABLET BY MOUTH DAILY 6/11/20   Ian Alonzo MD   SAFE-T-PRO LANCETS MISC TEST FOUR TIMES DAILY 6/10/20   Ian Alonzo MD   pravastatin (PRAVACHOL) 80 MG tablet TAKE 1 TABLET BY MOUTH DAILY 5/18/20   Ian Alonzo MD   mirtazapine (REMERON) 15 MG tablet TAKE 1/2 TABLET BY MOUTH EVERY NIGHT AT BEDTIME 5/18/20   Ian Alonzo MD   lamoTRIgine (LAMICTAL) 25 MG tablet TAKE 1 TABLET BY MOUTH DAILY 5/18/20   Ian Alonzo MD   STOOL SOFTENER 100 MG capsule TAKE 1 CAPSULE BY MOUTH TWICE DAILY 5/18/20   Ian Alonzo MD   lisinopril (PRINIVIL;ZESTRIL) 20 MG tablet TAKE 1 TABLET BY MOUTH DAILY 5/18/20   Ian Alonzo MD   omeprazole (PRILOSEC) 40 MG delayed release capsule TAKE 1 CAPSULE BY MOUTH DAILY 5/5/20   Ian Alonzo MD   Insulin Pen Needle (NOVOFINE AUTOCOVER) 30G X 8 MM MISC USE FOUR TIMES DAILY 4/14/20   Ian Alonzo MD   mometasone (ELOCON) 0.1 % cream Apply topically daily. 4/1/19   Ian Alonzo MD   ACCU-CHEK RAVI PLUS strip TEST 2 TO 3 TIMES DAILY 4/1/19   Ian Alonzo MD   Blood Glucose Monitoring Suppl (EASY STEP GLUCOSE MONITOR) w/Device KIT Patient testing 2-3 times daily per E11.9   Patient also needs test strips and lancets. 3/29/19   Ian Alonzo MD   diphenhydrAMINE (BENADRYL) 25 MG capsule Take 25 mg by mouth every 6 hours as needed for Itching    Historical Provider, MD   citalopram (CELEXA) 10 MG tablet Take 1 tablet by mouth daily 11/10/17   ANGEL Orourke - CNP   Multiple Vitamins-Minerals (THERAPEUTIC MULTIVITAMIN-MINERALS) tablet Take 1 tablet by mouth daily.     Historical

## 2020-07-21 RX ORDER — MIRTAZAPINE 15 MG/1
TABLET, FILM COATED ORAL
Qty: 45 TABLET | Refills: 0 | Status: SHIPPED | OUTPATIENT
Start: 2020-07-21

## 2020-07-21 RX ORDER — INSULIN GLARGINE 100 [IU]/ML
INJECTION, SOLUTION SUBCUTANEOUS
Qty: 15 ML | Refills: 0 | Status: SHIPPED | OUTPATIENT
Start: 2020-07-21 | End: 2020-10-01 | Stop reason: SDUPTHER

## 2020-07-27 RX ORDER — DOCUSATE SODIUM 100 MG
CAPSULE ORAL
Qty: 60 CAPSULE | Refills: 0 | Status: SHIPPED | OUTPATIENT
Start: 2020-07-27 | End: 2020-09-14

## 2020-07-27 NOTE — TELEPHONE ENCOUNTER
Requested Prescriptions     Pending Prescriptions Disp Refills    STOOL SOFTENER 100 MG capsule [Pharmacy Med Name: STOOL SOFTENER 100MG CAPSULES] 60 capsule 0     Sig: TAKE 1 CAPSULE BY MOUTH TWICE DAILY     LOV:5/19/2020  FOV: 8/26/2020 Universal Safety Interventions

## 2020-08-24 RX ORDER — LISINOPRIL 20 MG/1
TABLET ORAL
Qty: 90 TABLET | Refills: 0 | Status: SHIPPED | OUTPATIENT
Start: 2020-08-24 | End: 2022-05-03

## 2020-08-26 ENCOUNTER — OFFICE VISIT (OUTPATIENT)
Dept: INTERNAL MEDICINE CLINIC | Age: 80
End: 2020-08-26
Payer: MEDICARE

## 2020-08-26 VITALS
TEMPERATURE: 98.2 F | DIASTOLIC BLOOD PRESSURE: 78 MMHG | OXYGEN SATURATION: 97 % | BODY MASS INDEX: 32.95 KG/M2 | WEIGHT: 205 LBS | HEART RATE: 78 BPM | HEIGHT: 66 IN | SYSTOLIC BLOOD PRESSURE: 134 MMHG

## 2020-08-26 PROBLEM — R14.0 ABDOMINAL BLOATING: Status: RESOLVED | Noted: 2019-11-22 | Resolved: 2020-08-26

## 2020-08-26 PROCEDURE — G8400 PT W/DXA NO RESULTS DOC: HCPCS | Performed by: INTERNAL MEDICINE

## 2020-08-26 PROCEDURE — G8417 CALC BMI ABV UP PARAM F/U: HCPCS | Performed by: INTERNAL MEDICINE

## 2020-08-26 PROCEDURE — 99214 OFFICE O/P EST MOD 30 MIN: CPT | Performed by: INTERNAL MEDICINE

## 2020-08-26 PROCEDURE — 1090F PRES/ABSN URINE INCON ASSESS: CPT | Performed by: INTERNAL MEDICINE

## 2020-08-26 PROCEDURE — 1123F ACP DISCUSS/DSCN MKR DOCD: CPT | Performed by: INTERNAL MEDICINE

## 2020-08-26 PROCEDURE — 4040F PNEUMOC VAC/ADMIN/RCVD: CPT | Performed by: INTERNAL MEDICINE

## 2020-08-26 PROCEDURE — 1036F TOBACCO NON-USER: CPT | Performed by: INTERNAL MEDICINE

## 2020-08-26 PROCEDURE — 3052F HG A1C>EQUAL 8.0%<EQUAL 9.0%: CPT | Performed by: INTERNAL MEDICINE

## 2020-08-26 PROCEDURE — G8427 DOCREV CUR MEDS BY ELIG CLIN: HCPCS | Performed by: INTERNAL MEDICINE

## 2020-08-26 ASSESSMENT — ENCOUNTER SYMPTOMS
WHEEZING: 0
RESPIRATORY NEGATIVE: 1
SHORTNESS OF BREATH: 0
GASTROINTESTINAL NEGATIVE: 1
CHEST TIGHTNESS: 0

## 2020-08-26 ASSESSMENT — PATIENT HEALTH QUESTIONNAIRE - PHQ9
DEPRESSION UNABLE TO ASSESS: FUNCTIONAL CAPACITY MOTIVATION LIMITS ACCURACY
SUM OF ALL RESPONSES TO PHQ QUESTIONS 1-9: 0
SUM OF ALL RESPONSES TO PHQ9 QUESTIONS 1 & 2: 0
SUM OF ALL RESPONSES TO PHQ QUESTIONS 1-9: 0
2. FEELING DOWN, DEPRESSED OR HOPELESS: 0
1. LITTLE INTEREST OR PLEASURE IN DOING THINGS: 0

## 2020-08-26 NOTE — PROGRESS NOTES
Subjective:      Patient ID: Savita Powell is a [de-identified] y.o. y.o. female. Chief Complaint   Patient presents with    Hyperlipidemia    Hypertension    Diabetes       HPI    Patient is here for a recheck    She has been on lock down at her [facility]. Patient is taking blood pressure medication without problem  Her sugars have been good. She has been watching her diet. Her sugar has been better. Patient is taking their cholesterol medication and watching diet without problem. Vitals:    08/26/20 1419   BP: 134/78   Pulse: 78   Temp: 98.2 °F (36.8 °C)   SpO2: 97%       Wt Readings from Last 3 Encounters:   08/26/20 205 lb (93 kg)   03/05/20 200 lb (90.7 kg)   01/30/20 205 lb (93 kg)            Discussed use, benefit, and side effects of prescribed medications. Barriers to medication compliance addressed. All patient questions answered. Pt voiced understanding. Review of Systems   Constitutional: Negative. HENT: Negative. Respiratory: Negative. Negative for chest tightness, shortness of breath and wheezing. Cardiovascular: Negative. Negative for chest pain, palpitations and leg swelling. Gastrointestinal: Negative. Genitourinary: Negative. Musculoskeletal: Negative for arthralgias and myalgias. Neurological: Negative. Objective:   Physical Exam  Constitutional:       Appearance: She is well-developed. HENT:      Head: Normocephalic and atraumatic. Neck:      Thyroid: No thyromegaly. Vascular: No carotid bruit. Cardiovascular:      Rate and Rhythm: Normal rate and regular rhythm. Heart sounds: Normal heart sounds, S1 normal and S2 normal.   Pulmonary:      Effort: Pulmonary effort is normal.      Breath sounds: Normal breath sounds. No wheezing, rhonchi or rales. Assessment:      See Problem List assessment and plan       Plan:     No problem-specific Assessment & Plan notes found for this encounter. Patient engaged in shared decision making. Information given to evaluateoptions of treatment, understand what is needed and discuss importance of following plan.

## 2020-08-27 LAB
ALBUMIN SERPL-MCNC: 3.6 G/DL
ALP BLD-CCNC: 88 U/L
ALT SERPL-CCNC: 11 U/L
AST SERPL-CCNC: 13 U/L
AVERAGE GLUCOSE: NORMAL
BASOPHILS ABSOLUTE: 0.1 /ΜL
BASOPHILS RELATIVE PERCENT: 0.9 %
BILIRUB SERPL-MCNC: 0.3 MG/DL (ref 0.1–1.4)
BUN BLDV-MCNC: 22 MG/DL
CALCIUM SERPL-MCNC: 9.2 MG/DL
CHLORIDE BLD-SCNC: 108 MMOL/L
CO2: 24 MMOL/L
CREAT SERPL-MCNC: 1.4 MG/DL
EOSINOPHILS ABSOLUTE: 0.3 /ΜL
EOSINOPHILS RELATIVE PERCENT: 4.9 %
GLUCOSE FASTING: 168 MG/DL
HBA1C MFR BLD: 7.3 %
HCT VFR BLD CALC: 36.2 % (ref 36–46)
HEMOGLOBIN: 11.9 G/DL (ref 12–16)
LYMPHOCYTES ABSOLUTE: 1.8 /ΜL
LYMPHOCYTES RELATIVE PERCENT: 32.6 %
MCH RBC QN AUTO: 32 PG
MCHC RBC AUTO-ENTMCNC: 32.9 G/DL
MCV RBC AUTO: 97.1 FL
MONOCYTES ABSOLUTE: 0.6 /ΜL
MONOCYTES RELATIVE PERCENT: 9.9 %
NEUTROPHILS ABSOLUTE: 2.9 /ΜL
NEUTROPHILS RELATIVE PERCENT: 51.7 %
PDW BLD-RTO: 13.6 %
PLATELET # BLD: 341 K/ΜL
PMV BLD AUTO: 8.7 FL
POTASSIUM SERPL-SCNC: 4.7 MMOL/L
RBC # BLD: 3.73 10^6/ΜL
SODIUM BLD-SCNC: 139 MMOL/L
TOTAL PROTEIN: 6 G/DL (ref 6.4–8.2)
VITAMIN D 25-HYDROXY: 31
VITAMIN D2, 25 HYDROXY: NORMAL
VITAMIN D3,25 HYDROXY: NORMAL
WBC # BLD: 5.6 10^3/ML

## 2020-08-31 RX ORDER — PRAVASTATIN SODIUM 80 MG/1
TABLET ORAL
Qty: 90 TABLET | Refills: 0 | Status: SHIPPED | OUTPATIENT
Start: 2020-08-31

## 2020-08-31 RX ORDER — DICYCLOMINE HCL 20 MG
TABLET ORAL
Qty: 30 TABLET | Refills: 1 | Status: SHIPPED | OUTPATIENT
Start: 2020-08-31 | End: 2020-09-23 | Stop reason: SDUPTHER

## 2020-09-03 LAB
BILIRUBIN, URINE: NEGATIVE
BLOOD, URINE: NEGATIVE
CLARITY: NORMAL
COLOR: NORMAL
GLUCOSE URINE: NORMAL
KETONES, URINE: NEGATIVE
LEUKOCYTE ESTERASE, URINE: NORMAL
NITRITE, URINE: NEGATIVE
PH UA: 6 (ref 4.5–8)
PROTEIN UA: NEGATIVE
SPECIFIC GRAVITY, URINE: 1.02
UROBILINOGEN, URINE: NORMAL

## 2020-09-10 RX ORDER — TRAZODONE HYDROCHLORIDE 50 MG/1
TABLET ORAL
Qty: 90 TABLET | Refills: 0 | Status: SHIPPED | OUTPATIENT
Start: 2020-09-10

## 2020-09-11 RX ORDER — NITROFURANTOIN 25; 75 MG/1; MG/1
100 CAPSULE ORAL 2 TIMES DAILY
Qty: 14 CAPSULE | Refills: 0 | Status: SHIPPED | OUTPATIENT
Start: 2020-09-11 | End: 2020-09-18

## 2020-09-14 RX ORDER — DOCUSATE SODIUM 100 MG
CAPSULE ORAL
Qty: 60 CAPSULE | Refills: 0 | Status: SHIPPED | OUTPATIENT
Start: 2020-09-14

## 2020-09-23 ENCOUNTER — TELEPHONE (OUTPATIENT)
Dept: INTERNAL MEDICINE CLINIC | Age: 80
End: 2020-09-23

## 2020-09-23 RX ORDER — OMEPRAZOLE 40 MG/1
40 CAPSULE, DELAYED RELEASE ORAL DAILY
Qty: 90 CAPSULE | Refills: 0 | Status: SHIPPED | OUTPATIENT
Start: 2020-09-23

## 2020-09-23 RX ORDER — CITALOPRAM 10 MG/1
10 TABLET ORAL DAILY
Qty: 30 TABLET | Refills: 3 | Status: SHIPPED | OUTPATIENT
Start: 2020-09-23

## 2020-09-23 RX ORDER — LAMOTRIGINE 25 MG/1
25 TABLET ORAL DAILY
Qty: 90 TABLET | Refills: 0 | Status: SHIPPED | OUTPATIENT
Start: 2020-09-23 | End: 2020-11-13

## 2020-09-23 RX ORDER — DICYCLOMINE HCL 20 MG
TABLET ORAL
Qty: 30 TABLET | Refills: 1 | Status: ON HOLD | OUTPATIENT
Start: 2020-09-23 | End: 2020-11-17 | Stop reason: HOSPADM

## 2020-10-01 ENCOUNTER — TELEPHONE (OUTPATIENT)
Dept: INTERNAL MEDICINE CLINIC | Age: 80
End: 2020-10-01

## 2020-10-01 RX ORDER — INSULIN GLARGINE 100 [IU]/ML
INJECTION, SOLUTION SUBCUTANEOUS
Qty: 15 ML | Refills: 3 | Status: SHIPPED | OUTPATIENT
Start: 2020-10-01 | End: 2020-11-13 | Stop reason: DRUGHIGH

## 2020-10-06 ENCOUNTER — TELEPHONE (OUTPATIENT)
Dept: INTERNAL MEDICINE CLINIC | Age: 80
End: 2020-10-06

## 2020-10-06 NOTE — TELEPHONE ENCOUNTER
Pt needs needles for her NOVOFINE insulin pen  [facility] [address] [facility], [address] [place] [address]

## 2020-11-09 ENCOUNTER — TELEPHONE (OUTPATIENT)
Dept: INTERNAL MEDICINE CLINIC | Age: 80
End: 2020-11-09

## 2020-11-09 NOTE — TELEPHONE ENCOUNTER
Incoming fax from [facility] Hook patient became dizzy & fell- coming from the bathroom. No injury notes   /72, Pulse-80 R- 20 Temp.- 97.9. Denies hitting her head & will monitor. Also scanned incoming fax in. It is hard to read.

## 2020-11-13 ENCOUNTER — APPOINTMENT (OUTPATIENT)
Dept: CT IMAGING | Age: 80
DRG: 177 | End: 2020-11-13
Payer: MEDICARE

## 2020-11-13 ENCOUNTER — HOSPITAL ENCOUNTER (INPATIENT)
Age: 80
LOS: 4 days | Discharge: NURSING, REHAB OR LONG-TERM CARE | DRG: 177 | End: 2020-11-17
Attending: EMERGENCY MEDICINE | Admitting: INTERNAL MEDICINE
Payer: MEDICARE

## 2020-11-13 ENCOUNTER — APPOINTMENT (OUTPATIENT)
Dept: GENERAL RADIOLOGY | Age: 80
DRG: 177 | End: 2020-11-13
Payer: MEDICARE

## 2020-11-13 ENCOUNTER — TELEPHONE (OUTPATIENT)
Dept: INTERNAL MEDICINE CLINIC | Age: 80
End: 2020-11-13

## 2020-11-13 PROBLEM — Z20.822 SUSPECTED COVID-19 VIRUS INFECTION: Status: ACTIVE | Noted: 2020-11-13

## 2020-11-13 LAB
ALBUMIN SERPL-MCNC: 3.5 G/DL (ref 3.4–5)
ALP BLD-CCNC: 79 U/L (ref 40–129)
ALT SERPL-CCNC: 16 U/L (ref 10–40)
ANION GAP SERPL CALCULATED.3IONS-SCNC: 18 MMOL/L (ref 3–16)
AST SERPL-CCNC: 32 U/L (ref 15–37)
BACTERIA: ABNORMAL /HPF
BASOPHILS ABSOLUTE: 0 K/UL (ref 0–0.2)
BASOPHILS RELATIVE PERCENT: 0.5 %
BILIRUB SERPL-MCNC: 0.3 MG/DL (ref 0–1)
BILIRUBIN DIRECT: <0.2 MG/DL (ref 0–0.3)
BILIRUBIN URINE: NEGATIVE
BILIRUBIN, INDIRECT: NORMAL MG/DL (ref 0–1)
BLOOD, URINE: NEGATIVE
BUN BLDV-MCNC: 18 MG/DL (ref 7–20)
C DIFF TOXIN/ANTIGEN: NORMAL
CALCIUM SERPL-MCNC: 8.2 MG/DL (ref 8.3–10.6)
CHLORIDE BLD-SCNC: 97 MMOL/L (ref 99–110)
CHP ED QC CHECK: YES
CLARITY: CLEAR
CO2: 17 MMOL/L (ref 21–32)
COLOR: YELLOW
CREAT SERPL-MCNC: 1.4 MG/DL (ref 0.6–1.2)
EOSINOPHILS ABSOLUTE: 0 K/UL (ref 0–0.6)
EOSINOPHILS RELATIVE PERCENT: 0.1 %
EPITHELIAL CELLS, UA: ABNORMAL /HPF (ref 0–5)
GFR AFRICAN AMERICAN: 44
GFR NON-AFRICAN AMERICAN: 36
GLUCOSE BLD-MCNC: 174 MG/DL
GLUCOSE BLD-MCNC: 174 MG/DL (ref 70–99)
GLUCOSE BLD-MCNC: 313 MG/DL (ref 70–99)
GLUCOSE URINE: 500 MG/DL
HCT VFR BLD CALC: 39.6 % (ref 36–48)
HEMOGLOBIN: 13 G/DL (ref 12–16)
KETONES, URINE: NEGATIVE MG/DL
LACTIC ACID: 2.5 MMOL/L (ref 0.4–2)
LEUKOCYTE ESTERASE, URINE: ABNORMAL
LIPASE: 51 U/L (ref 13–60)
LYMPHOCYTES ABSOLUTE: 0.9 K/UL (ref 1–5.1)
LYMPHOCYTES RELATIVE PERCENT: 21.9 %
MCH RBC QN AUTO: 31.3 PG (ref 26–34)
MCHC RBC AUTO-ENTMCNC: 32.8 G/DL (ref 31–36)
MCV RBC AUTO: 95.4 FL (ref 80–100)
MICROSCOPIC EXAMINATION: YES
MONOCYTES ABSOLUTE: 0.3 K/UL (ref 0–1.3)
MONOCYTES RELATIVE PERCENT: 8.5 %
MUCUS: ABNORMAL /LPF
NEUTROPHILS ABSOLUTE: 2.7 K/UL (ref 1.7–7.7)
NEUTROPHILS RELATIVE PERCENT: 69 %
NITRITE, URINE: NEGATIVE
PDW BLD-RTO: 13.7 % (ref 12.4–15.4)
PERFORMED ON: ABNORMAL
PH UA: 6 (ref 5–8)
PLATELET # BLD: 223 K/UL (ref 135–450)
PMV BLD AUTO: 8.2 FL (ref 5–10.5)
POTASSIUM REFLEX MAGNESIUM: 4 MMOL/L (ref 3.5–5.1)
PROTEIN UA: 100 MG/DL
RBC # BLD: 4.15 M/UL (ref 4–5.2)
RBC UA: ABNORMAL /HPF (ref 0–4)
SODIUM BLD-SCNC: 132 MMOL/L (ref 136–145)
SPECIFIC GRAVITY UA: 1.02 (ref 1–1.03)
TOTAL PROTEIN: 6.5 G/DL (ref 6.4–8.2)
URINE TYPE: ABNORMAL
UROBILINOGEN, URINE: 2 E.U./DL
WBC # BLD: 3.9 K/UL (ref 4–11)
WBC UA: ABNORMAL /HPF (ref 0–5)

## 2020-11-13 PROCEDURE — 36415 COLL VENOUS BLD VENIPUNCTURE: CPT

## 2020-11-13 PROCEDURE — 83690 ASSAY OF LIPASE: CPT

## 2020-11-13 PROCEDURE — 96372 THER/PROPH/DIAG INJ SC/IM: CPT

## 2020-11-13 PROCEDURE — 74177 CT ABD & PELVIS W/CONTRAST: CPT

## 2020-11-13 PROCEDURE — 83605 ASSAY OF LACTIC ACID: CPT

## 2020-11-13 PROCEDURE — 6360000002 HC RX W HCPCS: Performed by: PHYSICIAN ASSISTANT

## 2020-11-13 PROCEDURE — 96374 THER/PROPH/DIAG INJ IV PUSH: CPT

## 2020-11-13 PROCEDURE — 2060000000 HC ICU INTERMEDIATE R&B

## 2020-11-13 PROCEDURE — 85025 COMPLETE CBC W/AUTO DIFF WBC: CPT

## 2020-11-13 PROCEDURE — 6370000000 HC RX 637 (ALT 250 FOR IP): Performed by: STUDENT IN AN ORGANIZED HEALTH CARE EDUCATION/TRAINING PROGRAM

## 2020-11-13 PROCEDURE — 87324 CLOSTRIDIUM AG IA: CPT

## 2020-11-13 PROCEDURE — 80076 HEPATIC FUNCTION PANEL: CPT

## 2020-11-13 PROCEDURE — 99283 EMERGENCY DEPT VISIT LOW MDM: CPT

## 2020-11-13 PROCEDURE — U0003 INFECTIOUS AGENT DETECTION BY NUCLEIC ACID (DNA OR RNA); SEVERE ACUTE RESPIRATORY SYNDROME CORONAVIRUS 2 (SARS-COV-2) (CORONAVIRUS DISEASE [COVID-19]), AMPLIFIED PROBE TECHNIQUE, MAKING USE OF HIGH THROUGHPUT TECHNOLOGIES AS DESCRIBED BY CMS-2020-01-R: HCPCS

## 2020-11-13 PROCEDURE — 2580000003 HC RX 258: Performed by: STUDENT IN AN ORGANIZED HEALTH CARE EDUCATION/TRAINING PROGRAM

## 2020-11-13 PROCEDURE — 6360000002 HC RX W HCPCS: Performed by: STUDENT IN AN ORGANIZED HEALTH CARE EDUCATION/TRAINING PROGRAM

## 2020-11-13 PROCEDURE — 6360000004 HC RX CONTRAST MEDICATION: Performed by: PHYSICIAN ASSISTANT

## 2020-11-13 PROCEDURE — 2580000003 HC RX 258: Performed by: PHYSICIAN ASSISTANT

## 2020-11-13 PROCEDURE — 81001 URINALYSIS AUTO W/SCOPE: CPT

## 2020-11-13 PROCEDURE — 80048 BASIC METABOLIC PNL TOTAL CA: CPT

## 2020-11-13 PROCEDURE — 87505 NFCT AGENT DETECTION GI: CPT

## 2020-11-13 PROCEDURE — 87449 NOS EACH ORGANISM AG IA: CPT

## 2020-11-13 PROCEDURE — 71045 X-RAY EXAM CHEST 1 VIEW: CPT

## 2020-11-13 RX ORDER — TRAZODONE HYDROCHLORIDE 50 MG/1
50 TABLET ORAL NIGHTLY
Status: DISCONTINUED | OUTPATIENT
Start: 2020-11-13 | End: 2020-11-17 | Stop reason: HOSPADM

## 2020-11-13 RX ORDER — ACETAMINOPHEN 650 MG/1
650 SUPPOSITORY RECTAL EVERY 6 HOURS PRN
Status: DISCONTINUED | OUTPATIENT
Start: 2020-11-13 | End: 2020-11-17 | Stop reason: HOSPADM

## 2020-11-13 RX ORDER — INSULIN LISPRO 100 [IU]/ML
0-6 INJECTION, SOLUTION INTRAVENOUS; SUBCUTANEOUS
Status: DISCONTINUED | OUTPATIENT
Start: 2020-11-14 | End: 2020-11-14

## 2020-11-13 RX ORDER — PANTOPRAZOLE SODIUM 40 MG/1
40 TABLET, DELAYED RELEASE ORAL
Status: DISCONTINUED | OUTPATIENT
Start: 2020-11-14 | End: 2020-11-17 | Stop reason: HOSPADM

## 2020-11-13 RX ORDER — AMLODIPINE BESYLATE 10 MG/1
10 TABLET ORAL DAILY
Status: DISCONTINUED | OUTPATIENT
Start: 2020-11-13 | End: 2020-11-17 | Stop reason: HOSPADM

## 2020-11-13 RX ORDER — DEXTROSE MONOHYDRATE 50 MG/ML
100 INJECTION, SOLUTION INTRAVENOUS PRN
Status: DISCONTINUED | OUTPATIENT
Start: 2020-11-13 | End: 2020-11-17 | Stop reason: HOSPADM

## 2020-11-13 RX ORDER — SODIUM CHLORIDE, SODIUM LACTATE, POTASSIUM CHLORIDE, CALCIUM CHLORIDE 600; 310; 30; 20 MG/100ML; MG/100ML; MG/100ML; MG/100ML
1000 INJECTION, SOLUTION INTRAVENOUS ONCE
Status: COMPLETED | OUTPATIENT
Start: 2020-11-13 | End: 2020-11-13

## 2020-11-13 RX ORDER — CITALOPRAM 10 MG/1
10 TABLET ORAL DAILY
Status: DISCONTINUED | OUTPATIENT
Start: 2020-11-13 | End: 2020-11-17 | Stop reason: HOSPADM

## 2020-11-13 RX ORDER — LEVOTHYROXINE SODIUM 0.1 MG/1
100 TABLET ORAL
Status: DISCONTINUED | OUTPATIENT
Start: 2020-11-14 | End: 2020-11-17 | Stop reason: HOSPADM

## 2020-11-13 RX ORDER — INSULIN LISPRO 100 [IU]/ML
0-3 INJECTION, SOLUTION INTRAVENOUS; SUBCUTANEOUS NIGHTLY
Status: DISCONTINUED | OUTPATIENT
Start: 2020-11-13 | End: 2020-11-14

## 2020-11-13 RX ORDER — DEXTROSE MONOHYDRATE 25 G/50ML
12.5 INJECTION, SOLUTION INTRAVENOUS PRN
Status: DISCONTINUED | OUTPATIENT
Start: 2020-11-13 | End: 2020-11-17 | Stop reason: HOSPADM

## 2020-11-13 RX ORDER — SODIUM CHLORIDE 0.9 % (FLUSH) 0.9 %
10 SYRINGE (ML) INJECTION PRN
Status: DISCONTINUED | OUTPATIENT
Start: 2020-11-13 | End: 2020-11-17 | Stop reason: HOSPADM

## 2020-11-13 RX ORDER — ONDANSETRON 2 MG/ML
4 INJECTION INTRAMUSCULAR; INTRAVENOUS ONCE
Status: COMPLETED | OUTPATIENT
Start: 2020-11-13 | End: 2020-11-13

## 2020-11-13 RX ORDER — ACETAMINOPHEN 325 MG/1
650 TABLET ORAL EVERY 6 HOURS PRN
Status: DISCONTINUED | OUTPATIENT
Start: 2020-11-13 | End: 2020-11-17 | Stop reason: HOSPADM

## 2020-11-13 RX ORDER — HEPARIN SODIUM 5000 [USP'U]/ML
5000 INJECTION, SOLUTION INTRAVENOUS; SUBCUTANEOUS EVERY 8 HOURS SCHEDULED
Status: DISCONTINUED | OUTPATIENT
Start: 2020-11-13 | End: 2020-11-17 | Stop reason: HOSPADM

## 2020-11-13 RX ORDER — LISINOPRIL 20 MG/1
1 TABLET ORAL DAILY
Status: CANCELLED | OUTPATIENT
Start: 2020-11-13

## 2020-11-13 RX ORDER — LABETALOL HYDROCHLORIDE 5 MG/ML
10 INJECTION, SOLUTION INTRAVENOUS EVERY 6 HOURS PRN
Status: DISCONTINUED | OUTPATIENT
Start: 2020-11-13 | End: 2020-11-17 | Stop reason: HOSPADM

## 2020-11-13 RX ORDER — INSULIN GLARGINE 100 [IU]/ML
20 INJECTION, SOLUTION SUBCUTANEOUS 2 TIMES DAILY
Status: ON HOLD | COMMUNITY
End: 2020-11-17 | Stop reason: HOSPADM

## 2020-11-13 RX ORDER — POLYETHYLENE GLYCOL 3350 17 G/17G
17 POWDER, FOR SOLUTION ORAL DAILY PRN
Status: DISCONTINUED | OUTPATIENT
Start: 2020-11-13 | End: 2020-11-17 | Stop reason: HOSPADM

## 2020-11-13 RX ORDER — PROMETHAZINE HYDROCHLORIDE 25 MG/1
12.5 TABLET ORAL EVERY 6 HOURS PRN
Status: DISCONTINUED | OUTPATIENT
Start: 2020-11-13 | End: 2020-11-17 | Stop reason: HOSPADM

## 2020-11-13 RX ORDER — DICYCLOMINE HYDROCHLORIDE 10 MG/ML
10 INJECTION INTRAMUSCULAR ONCE
Status: COMPLETED | OUTPATIENT
Start: 2020-11-13 | End: 2020-11-13

## 2020-11-13 RX ORDER — HYDRALAZINE HYDROCHLORIDE 20 MG/ML
5 INJECTION INTRAMUSCULAR; INTRAVENOUS EVERY 6 HOURS PRN
Status: DISCONTINUED | OUTPATIENT
Start: 2020-11-13 | End: 2020-11-17 | Stop reason: HOSPADM

## 2020-11-13 RX ORDER — SODIUM CHLORIDE 9 MG/ML
INJECTION, SOLUTION INTRAVENOUS CONTINUOUS
Status: DISCONTINUED | OUTPATIENT
Start: 2020-11-13 | End: 2020-11-14

## 2020-11-13 RX ORDER — PRAVASTATIN SODIUM 80 MG/1
80 TABLET ORAL DAILY
Status: DISCONTINUED | OUTPATIENT
Start: 2020-11-13 | End: 2020-11-17 | Stop reason: HOSPADM

## 2020-11-13 RX ORDER — NICOTINE POLACRILEX 4 MG
15 LOZENGE BUCCAL PRN
Status: DISCONTINUED | OUTPATIENT
Start: 2020-11-13 | End: 2020-11-17 | Stop reason: HOSPADM

## 2020-11-13 RX ORDER — INSULIN LISPRO 100 [IU]/ML
25-30 INJECTION, SOLUTION INTRAVENOUS; SUBCUTANEOUS
COMMUNITY

## 2020-11-13 RX ORDER — M-VIT,TX,IRON,MINS/CALC/FOLIC 27MG-0.4MG
1 TABLET ORAL DAILY
Status: DISCONTINUED | OUTPATIENT
Start: 2020-11-13 | End: 2020-11-17 | Stop reason: HOSPADM

## 2020-11-13 RX ORDER — ONDANSETRON 2 MG/ML
4 INJECTION INTRAMUSCULAR; INTRAVENOUS EVERY 6 HOURS PRN
Status: DISCONTINUED | OUTPATIENT
Start: 2020-11-13 | End: 2020-11-17 | Stop reason: HOSPADM

## 2020-11-13 RX ORDER — DICYCLOMINE HCL 20 MG
20 TABLET ORAL DAILY
Status: DISCONTINUED | OUTPATIENT
Start: 2020-11-13 | End: 2020-11-17 | Stop reason: HOSPADM

## 2020-11-13 RX ORDER — MIRTAZAPINE 15 MG/1
15 TABLET, FILM COATED ORAL NIGHTLY
Status: DISCONTINUED | OUTPATIENT
Start: 2020-11-13 | End: 2020-11-17 | Stop reason: HOSPADM

## 2020-11-13 RX ORDER — SODIUM CHLORIDE 0.9 % (FLUSH) 0.9 %
10 SYRINGE (ML) INJECTION EVERY 12 HOURS SCHEDULED
Status: DISCONTINUED | OUTPATIENT
Start: 2020-11-13 | End: 2020-11-17 | Stop reason: HOSPADM

## 2020-11-13 RX ORDER — DICYCLOMINE HCL 20 MG
1 TABLET ORAL DAILY
Status: CANCELLED | OUTPATIENT
Start: 2020-11-13

## 2020-11-13 RX ADMIN — MIRTAZAPINE 15 MG: 15 TABLET, FILM COATED ORAL at 21:44

## 2020-11-13 RX ADMIN — DEXAMETHASONE 6 MG: 4 TABLET ORAL at 21:42

## 2020-11-13 RX ADMIN — SODIUM CHLORIDE: 9 INJECTION, SOLUTION INTRAVENOUS at 21:53

## 2020-11-13 RX ADMIN — INSULIN GLARGINE 20 UNITS: 100 INJECTION, SOLUTION SUBCUTANEOUS at 21:45

## 2020-11-13 RX ADMIN — IOPAMIDOL 80 ML: 755 INJECTION, SOLUTION INTRAVENOUS at 14:37

## 2020-11-13 RX ADMIN — Medication 10 ML: at 21:49

## 2020-11-13 RX ADMIN — TRAZODONE HYDROCHLORIDE 50 MG: 50 TABLET, FILM COATED ORAL at 21:44

## 2020-11-13 RX ADMIN — DICYCLOMINE HYDROCHLORIDE 10 MG: 20 INJECTION, SOLUTION INTRAMUSCULAR at 14:13

## 2020-11-13 RX ADMIN — SODIUM CHLORIDE, SODIUM LACTATE, POTASSIUM CHLORIDE, AND CALCIUM CHLORIDE 1000 ML: .6; .31; .03; .02 INJECTION, SOLUTION INTRAVENOUS at 14:13

## 2020-11-13 RX ADMIN — ONDANSETRON 4 MG: 2 INJECTION INTRAMUSCULAR; INTRAVENOUS at 14:13

## 2020-11-13 RX ADMIN — AMLODIPINE BESYLATE 10 MG: 10 TABLET ORAL at 21:53

## 2020-11-13 ASSESSMENT — PAIN SCALES - GENERAL: PAINLEVEL_OUTOF10: 8

## 2020-11-13 NOTE — ED PROVIDER NOTES
[address] [facility]          PHYSICIAN ASSISTANT NOTE       Date of evaluation: 2020    Chief Complaint     Abdominal Pain and Diarrhea      History of Present Illness     Lorena Andrews is a [de-identified] y.o. female with past medical history significant for hypertension, hyperlipidemia, depression, hypothyroidism, GERD, diabetes who presents with complaints of 1 week history of diarrhea and lower abdominal cramping. Patient is a resident at [facility]. Patient states that she was playing cards with other residents and they all got tested for Covid. States that this was approximately a week ago, but symptoms did not start until later that evening or the following day. States that she has had a nonproductive cough over the past week as well. Endorses associated nausea without emesis. Denies any documented fever at home. Denies any blood in stool. Denies urinary complaints, chest pain, difficulty breathing. Review of Systems     Review of Systems   See HPI, all others negative. Past Medical, Surgical, Family, and Social History     She has a past medical history of Almonte's esophagus without dysplasia, Depression, DM2 (diabetes mellitus, type 2) ([facility] [address] History of blood transfusion, Hyperlipidemia, Hypertension, Hypothyroid, and Shingles. She has a past surgical history that includes knee surgery (2011); Cataract removal (2011); Tonsillectomy;  section; bladder suspension; lumbar laminectomy (3/2/15); and Cholecystectomy, laparoscopic (10/17/2017). Her family history includes Coronary Art Dis in her father and mother; Diabetes in her mother; Other in her mother and sister. She reports that she has never smoked. She has never used smokeless tobacco. She reports that she does not drink alcohol or use drugs.     Medications     Previous Medications    CITALOPRAM (CELEXA) 10 MG TABLET    Take 1 tablet by mouth daily    DICYCLOMINE (BENTYL) 20 MG TABLET normal. No respiratory distress. Comments: O2 saturation of 90% on room air. Lungs difficult to fully assess but crackles noted. Abdominal:      General: There is no distension. Palpations: Abdomen is soft. Tenderness: There is abdominal tenderness in the suprapubic area and left lower quadrant. Skin:     General: Skin is warm and dry. Neurological:      General: No focal deficit present. Mental Status: She is alert and oriented to person, place, and time. Diagnostic Results     RADIOLOGY:  CT CHEST ABDOMEN PELVIS W CONTRAST   Final Result      CHEST:      1. Diffuse bilateral groundglass pulmonary infiltrates with interlobular septal thickening. Findings may be seen in the setting of atypical pneumonia such as Covid-19 pneumonia. Correlation is advised. ABDOMEN/PELVIS:      1. Areas of bladder wall thickening suggested. Correlation with urinalysis. Consideration of urologic evaluation/cystoscopy may be beneficial.     2. Fluid density noted within the colon, concurrent with history of diarrhea. XR CHEST PORTABLE   Final Result   1. Multifocal airspace disease, right upper lobe and left lower lobe.           LABS:   Results for orders placed or performed during the hospital encounter of 11/13/20   Clostridium difficile toxin/antigen    Specimen: Stool   Result Value Ref Range    C.diff Toxin/Antigen       Negative for Clostridium difficile antigen and toxin  Normal Range: Negative     CBC Auto Differential   Result Value Ref Range    WBC 3.9 (L) 4.0 - 11.0 K/uL    RBC 4.15 4.00 - 5.20 M/uL    Hemoglobin 13.0 12.0 - 16.0 g/dL    Hematocrit 39.6 36.0 - 48.0 %    MCV 95.4 80.0 - 100.0 fL    MCH 31.3 26.0 - 34.0 pg    MCHC 32.8 31.0 - 36.0 g/dL    RDW 13.7 12.4 - 15.4 %    Platelets 727 919 - 838 K/uL    MPV 8.2 5.0 - 10.5 fL    Neutrophils % 69.0 %    Lymphocytes % 21.9 %    Monocytes % 8.5 %    Eosinophils % 0.1 %    Basophils % 0.5 %    Neutrophils Absolute 2.7 1.7 - 7.7 K/uL    Lymphocytes Absolute 0.9 (L) 1.0 - 5.1 K/uL    Monocytes Absolute 0.3 0.0 - 1.3 K/uL    Eosinophils Absolute 0.0 0.0 - 0.6 K/uL    Basophils Absolute 0.0 0.0 - 0.2 K/uL   Basic Metabolic Panel w/ Reflex to MG   Result Value Ref Range    Sodium 132 (L) 136 - 145 mmol/L    Potassium reflex Magnesium 4.0 3.5 - 5.1 mmol/L    Chloride 97 (L) 99 - 110 mmol/L    CO2 17 (L) 21 - 32 mmol/L    Anion Gap 18 (H) 3 - 16    Glucose 313 (H) 70 - 99 mg/dL    BUN 18 7 - 20 mg/dL    CREATININE 1.4 (H) 0.6 - 1.2 mg/dL    GFR Non- 36 (A) >60    GFR  44 (A) >60    Calcium 8.2 (L) 8.3 - 10.6 mg/dL   Lipase   Result Value Ref Range    Lipase 51.0 13.0 - 60.0 U/L   Hepatic Function Panel   Result Value Ref Range    Total Protein 6.5 6.4 - 8.2 g/dL    Alb 3.5 3.4 - 5.0 g/dL    Alkaline Phosphatase 79 40 - 129 U/L    ALT 16 10 - 40 U/L    AST 32 15 - 37 U/L    Total Bilirubin 0.3 0.0 - 1.0 mg/dL    Bilirubin, Direct <0.2 0.0 - 0.3 mg/dL    Bilirubin, Indirect see below 0.0 - 1.0 mg/dL   Urinalysis, reflex to microscopic   Result Value Ref Range    Color, UA Yellow Straw/Yellow    Clarity, UA Clear Clear    Glucose, Ur 500 (A) Negative mg/dL    Bilirubin Urine Negative Negative    Ketones, Urine Negative Negative mg/dL    Specific Gravity, UA 1.020 1.005 - 1.030    Blood, Urine Negative Negative    pH, UA 6.0 5.0 - 8.0    Protein,  (A) Negative mg/dL    Urobilinogen, Urine 2.0 (A) <2.0 E.U./dL    Nitrite, Urine Negative Negative    Leukocyte Esterase, Urine TRACE (A) Negative    Microscopic Examination YES     Urine Type NotGiven    Lactic Acid, Plasma   Result Value Ref Range    Lactic Acid 2.5 (H) 0.4 - 2.0 mmol/L   Microscopic Urinalysis   Result Value Ref Range    Mucus, UA 1+ (A) None Seen /LPF    WBC, UA 10-20 (A) 0 - 5 /HPF    RBC, UA 0-2 0 - 4 /HPF    Epithelial Cells, UA 6-10 (A) 0 - 5 /HPF    Bacteria, UA 2+ (A) None Seen /HPF     RECENT VITALS:  BP: (!) 175/56, Temp: 101.3 °F (38.5 °C), Pulse: 72,  , SpO2: 94 %     Procedures       ED Course     Nursing Notes, Past Medical Hx,Past Surgical Hx, Social Hx, Allergies, and Family Hx were reviewed. The patient was given the following medications:  Orders Placed This Encounter   Medications    dicyclomine (BENTYL) injection 10 mg    ondansetron (ZOFRAN) injection 4 mg    lactated ringers infusion 1,000 mL    iopamidol (ISOVUE-370) 76 % injection 80 mL       CONSULTS:  Jil Payton / FALLON / Zoeyteodora Jessica is a [de-identified] y.o. female from nursing facility with complaints of diarrhea and cough. Patient was given fluids, Zofran, and Bentyl for symptoms. Stool sample was sent and C. difficile was negative. Labs were ordered including CBC, BMP, lipase, LFTs, UA, lactate, and chest x-ray. CBC shows leukopenia and lymphopenia. Covid swab was sent and is pending at this time. Patient does occasionally have desaturation to her oxygen, occasionally even less than 90%, so was placed on 2 L nasal cannula. Chest x-ray shows multifocal infiltrates. Lipase and LFTs resulted within normal.  UA pending at this time. With patient's shortness of breath and left lower quadrant, CT chest abdomen pelvis with contrast ordered and showed diffuse ground-glass opacities and no acute inrta-abdominal pathology. With patient's symptoms, lymphopenia, chest x-ray and CT chest findings, concern is for COVID-19. Patient does have a new oxygen requirement, so will be admitted at this time for ongoing management. This patient was also evaluated by the attending physician. All care plans were discussed and agreed upon. Clinical Impression     1. Diarrhea, unspecified type    2. Hypoxia    3. COVID-19        Disposition     PATIENT REFERRED TO:  No follow-up provider specified.     DISCHARGE MEDICATIONS:  New Prescriptions    No medications on file       DISPOSITION Admitted 11/13/2020 04:48:42 PM        Sarabjit HASKINS DON De Leon  11/13/20 [id number]

## 2020-11-13 NOTE — ED PROVIDER NOTES
ED Attending Attestation Note     Date of evaluation: 11/13/2020    This patient was seen by the advance practice provider. I have seen and examined the patient, agree with the workup, evaluation, management and diagnosis. The care plan has been discussed. I have reviewed the ECG and concur with the JOSÉ MIGUEL's interpretation. My assessment reveals a 59-year-old female presenting to the emergency department with complaints of abdominal pain diarrhea. On examination the patient has left lower quadrant abdominal tenderness to palpation with voluntary guarding no rebound tenderness is noted.      Jayesh Masterson MD  11/13/20 [id number]

## 2020-11-13 NOTE — ED NOTES
Bed: A12-12  Expected date:   Expected time:   Means of arrival:   Comments:  [facility] [address] [facility]  11/13/20 [id number]

## 2020-11-13 NOTE — ED NOTES
Bed: C28-28  Expected date:   Expected time:   Means of arrival:   Comments:  12-->ROBERT Burnham RN  11/13/20 [id number]

## 2020-11-13 NOTE — H&P
History and Physical  PGY-1      Patient's PCP: Vandana Cassidy MD    CC: Diarrhea (1 week), lower abdominal pain, SOB    HISTORY OF PRESENT ILLNESS:     Ms. Josefa Murcia, [de-identified] y/o, female with hx of insulin dependent T2DM, HTN, HLD, MDD, hypothyroidism and GERD presented from  [facility] with diarrhea that lasted for one week, left lower abdominal cramping pain and SOB. She denied blood and mentioned that she went to the bathroom for 4-5 times a day. She denied blood in the stool. She described her right lower abdominal pain as cramp and she said that nothing makes it better or worse. She farnaz mentioned that she has a non productive cough for the past week. The patient desaturates to high 80s when she walks. She denied chest pain, headache, lightheadedness, urinary complaints. ED course: hypertensive (), feverish to 101. 3. mild hyponatremia (132), hyperglycemia (313), lactic acidosis (LA 2.5, bicarb 17). C diff negative. COVID-19 ordered. U/A showed glucose (500), protien (100), trace LE. CXR showed multifocal airspace disease in the right upper lobe and left lower lobe. CT chest w/ contrast showed diffuse b/l groundglass pulmonary infiltrates with interlobular septal thickening. CT abdomen w/ contrast showed area of bladder wall thickening.       PAST HISTORY:     Past Medical History:   Diagnosis Date    Almonte's esophagus without dysplasia     Depression     DM2 (diabetes mellitus, type 2) ([facility] [address]     History of blood transfusion      with childbirth    Hyperlipidemia     Hypertension     Hypothyroid     Shingles        Past Surgical History:   Procedure Laterality Date    BLADDER SUSPENSION      CATARACT REMOVAL  2011    both     SECTION      CHOLECYSTECTOMY, LAPAROSCOPIC  10/17/2017    LAPAROSCOPIC CHOLECYSTECTOMY WITH CHOLANGIOGRAM    KNEE SURGERY  2011    left    LUMBAR LAMINECTOMY  3/2/15    microlumbar laminectomy L4-5 with c-arm    TONSILLECTOMY         Social History Phosphatase 79 40 - 129 U/L    ALT 16 10 - 40 U/L    AST 32 15 - 37 U/L    Total Bilirubin 0.3 0.0 - 1.0 mg/dL    Bilirubin, Direct <0.2 0.0 - 0.3 mg/dL    Bilirubin, Indirect see below 0.0 - 1.0 mg/dL   Urinalysis, reflex to microscopic   Result Value Ref Range    Color, UA Yellow Straw/Yellow    Clarity, UA Clear Clear    Glucose, Ur 500 (A) Negative mg/dL    Bilirubin Urine Negative Negative    Ketones, Urine Negative Negative mg/dL    Specific Gravity, UA 1.020 1.005 - 1.030    Blood, Urine Negative Negative    pH, UA 6.0 5.0 - 8.0    Protein,  (A) Negative mg/dL    Urobilinogen, Urine 2.0 (A) <2.0 E.U./dL    Nitrite, Urine Negative Negative    Leukocyte Esterase, Urine TRACE (A) Negative    Microscopic Examination YES     Urine Type NotGiven    Lactic Acid, Plasma   Result Value Ref Range    Lactic Acid 2.5 (H) 0.4 - 2.0 mmol/L   Microscopic Urinalysis   Result Value Ref Range    Mucus, UA 1+ (A) None Seen /LPF    WBC, UA 10-20 (A) 0 - 5 /HPF    RBC, UA 0-2 0 - 4 /HPF    Epithelial Cells, UA 6-10 (A) 0 - 5 /HPF    Bacteria, UA 2+ (A) None Seen /HPF       U/A:  Recent Labs     11/13/20  1618   COLORU Yellow   PHUR 6.0   WBCUA 10-20*   RBCUA 0-2   MUCUS 1+*   BACTERIA 2+*   CLARITYU Clear   SPECGRAV 1.020   LEUKOCYTESUR TRACE*   UROBILINOGEN 2.0*   BILIRUBINUR Negative   BLOODU Negative   GLUCOSEU 500*       Rad:   CT CHEST ABDOMEN PELVIS W CONTRAST   Final Result      CHEST:      1. Diffuse bilateral groundglass pulmonary infiltrates with interlobular septal thickening. Findings may be seen in the setting of atypical pneumonia such as Covid-19 pneumonia. Correlation is advised. ABDOMEN/PELVIS:      1. Areas of bladder wall thickening suggested. Correlation with urinalysis. Consideration of urologic evaluation/cystoscopy may be beneficial.     2. Fluid density noted within the colon, concurrent with history of diarrhea. XR CHEST PORTABLE   Final Result   1.  Multifocal airspace disease, right upper lobe and left lower lobe. ASSESSMENT AND PLAN:   Mimi Head is a [de-identified] y.o. female, who was presented with acute hypoxic respiratory failure. Acute hypoxic respiratory failure possibly 2/2 COVID-19  Possible COVID-19 pneumonia  Patient desaturated to high 80s when she walks. CT chest w/ contrast (11/13) showed diffuse b/l groundglass pulmonary infiltrates with interlobular septal thickening.  - O2 as needed  - F/u COVID-19 results  - Decadron 6 mg for 5 days (1st day- 11/13)  - NS at 100 ml/he for 10 hr  - F/u Procalcitonin  - Droplet plus isolation  - If worsening hypoxia, consider Pulmonology consultation and Remdisivir    Diarrhea likely 2/2 COVID-19 pneumonia  Denied blood and mentioned that she went to the bathroom for 4-5 times a day. Crampy right lower abdominal pain. C diff negative. At home on Dicyclomine. Presented to ED on 01/16/2020 with similar abdominal pain and diarrhea. - as above  - F/u GI bacterial pathogens by PCR  - F/u Procalcitonin  - F/u I's/O's    LLQ abdominal pain probably 2/2 diarrhea  Pain started with diarrhea. CT abdomen w/ contrast (11/13) showed area of bladder wall thickening- non concerning.  - F/u I's/O's    Mild hyponatremia 2/2 dehydration  Na on admission 132.  - NS at 100 ml/he for 10 hr  - F/u daily BMP  - F/u I's/O's    Hypertensive urgency  HTN  SBP in . - F/u VS  - Home Lisinopril 20 mg daily held  - Amlodipine 10 mg   - PRN Labetalol 10 mg Q6hr PRN and Hydralazine 5 mg Q6hr PRN    Lactic acidosis 2/2 dehydration  LA on admission 2.5, Bicarb 17, anion gap 18.   - NS at 100 ml/he for 10 hr  - Trend LA Q6hr x2  - F/u I's/O's    Hyperglycemia in the settings of uncontrolled T2DM (insulin dependent)  On admission glucose 313. At home on Lantus 20 mg BID, Lispro, insulin. HbA1C (08/27/2020): 7.3%.  No ketones in urine.  - Decadron was started- monitor glucose  - Daily BMP  - Hypoglycemia protocol  - POCT glucose  - Diabetic diet  - LSSI    HLD  LDL 98, cholesterol 179, triglyceride 195.   - Continue home Pravastatin 80 mg daily    Hypothyroidism   TSH (05/28/2020): 17.96.  - Continue home Levothyroxine 100 MCG daily    GERD  At home on Omeprazole 40 mg.  - Protonix 40 mg daily    MDD  - Continue home Citalopram 10 mg daily, Mirtazapine 15 mg daily and Trazodone 50 mg daily    PT/OT eval       Code Status:Prior  FEN: Diabetic diet, NS at 100 ml/he for 10 hr  PPX: Heparin 5000 U SubQ, SCD's  DISPO: GMF    This patient has been staffed and discussed with Brock Orourke MD.  -----------------------------  Bj Jalloh MD, PGY-1

## 2020-11-13 NOTE — TELEPHONE ENCOUNTER
Pt is pale and dehydrated  She has had nausea and vomiting all week  Blood sugars 240  [facility] is asking if Joel Phelps will order some labs

## 2020-11-13 NOTE — TELEPHONE ENCOUNTER
Spoke to Neftali Fernández  and advised patient needs to go to ER  Neftali Fernández advised and patient will go to ER

## 2020-11-14 LAB
ANION GAP SERPL CALCULATED.3IONS-SCNC: 14 MMOL/L (ref 3–16)
BANDED NEUTROPHILS RELATIVE PERCENT: 2 % (ref 0–7)
BASOPHILS ABSOLUTE: 0 K/UL (ref 0–0.2)
BASOPHILS RELATIVE PERCENT: 0 %
BUN BLDV-MCNC: 16 MG/DL (ref 7–20)
CALCIUM SERPL-MCNC: 8.2 MG/DL (ref 8.3–10.6)
CHLORIDE BLD-SCNC: 100 MMOL/L (ref 99–110)
CO2: 17 MMOL/L (ref 21–32)
CREAT SERPL-MCNC: 1.5 MG/DL (ref 0.6–1.2)
EOSINOPHILS ABSOLUTE: 0 K/UL (ref 0–0.6)
EOSINOPHILS RELATIVE PERCENT: 0 %
GFR AFRICAN AMERICAN: 40
GFR NON-AFRICAN AMERICAN: 33
GLUCOSE BLD-MCNC: 389 MG/DL (ref 70–99)
GLUCOSE BLD-MCNC: 409 MG/DL (ref 70–99)
GLUCOSE BLD-MCNC: 428 MG/DL (ref 70–99)
GLUCOSE BLD-MCNC: 438 MG/DL (ref 70–99)
GLUCOSE BLD-MCNC: 452 MG/DL (ref 70–99)
GLUCOSE BLD-MCNC: 480 MG/DL (ref 70–99)
HCT VFR BLD CALC: 38.9 % (ref 36–48)
HEMOGLOBIN: 13 G/DL (ref 12–16)
LACTIC ACID: 0.7 MMOL/L (ref 0.4–2)
LYMPHOCYTES ABSOLUTE: 0.5 K/UL (ref 1–5.1)
LYMPHOCYTES RELATIVE PERCENT: 13 %
MCH RBC QN AUTO: 31.9 PG (ref 26–34)
MCHC RBC AUTO-ENTMCNC: 33.4 G/DL (ref 31–36)
MCV RBC AUTO: 95.5 FL (ref 80–100)
MONOCYTES ABSOLUTE: 0.2 K/UL (ref 0–1.3)
MONOCYTES RELATIVE PERCENT: 5 %
NEUTROPHILS ABSOLUTE: 3.4 K/UL (ref 1.7–7.7)
NEUTROPHILS RELATIVE PERCENT: 80 %
PDW BLD-RTO: 13.8 % (ref 12.4–15.4)
PERFORMED ON: ABNORMAL
PLATELET # BLD: 211 K/UL (ref 135–450)
PMV BLD AUTO: 8.8 FL (ref 5–10.5)
POTASSIUM REFLEX MAGNESIUM: 4.9 MMOL/L (ref 3.5–5.1)
RBC # BLD: 4.08 M/UL (ref 4–5.2)
RBC # BLD: NORMAL 10*6/UL
SODIUM BLD-SCNC: 131 MMOL/L (ref 136–145)
WBC # BLD: 4.1 K/UL (ref 4–11)

## 2020-11-14 PROCEDURE — 85025 COMPLETE CBC W/AUTO DIFF WBC: CPT

## 2020-11-14 PROCEDURE — 6370000000 HC RX 637 (ALT 250 FOR IP): Performed by: STUDENT IN AN ORGANIZED HEALTH CARE EDUCATION/TRAINING PROGRAM

## 2020-11-14 PROCEDURE — 6360000002 HC RX W HCPCS: Performed by: STUDENT IN AN ORGANIZED HEALTH CARE EDUCATION/TRAINING PROGRAM

## 2020-11-14 PROCEDURE — 2580000003 HC RX 258: Performed by: STUDENT IN AN ORGANIZED HEALTH CARE EDUCATION/TRAINING PROGRAM

## 2020-11-14 PROCEDURE — 36415 COLL VENOUS BLD VENIPUNCTURE: CPT

## 2020-11-14 PROCEDURE — 2500000003 HC RX 250 WO HCPCS: Performed by: STUDENT IN AN ORGANIZED HEALTH CARE EDUCATION/TRAINING PROGRAM

## 2020-11-14 PROCEDURE — 2060000000 HC ICU INTERMEDIATE R&B

## 2020-11-14 PROCEDURE — 83605 ASSAY OF LACTIC ACID: CPT

## 2020-11-14 PROCEDURE — 80048 BASIC METABOLIC PNL TOTAL CA: CPT

## 2020-11-14 RX ORDER — LISINOPRIL 20 MG/1
20 TABLET ORAL DAILY
Status: DISCONTINUED | OUTPATIENT
Start: 2020-11-14 | End: 2020-11-16

## 2020-11-14 RX ORDER — INSULIN LISPRO 100 [IU]/ML
8 INJECTION, SOLUTION INTRAVENOUS; SUBCUTANEOUS ONCE
Status: COMPLETED | OUTPATIENT
Start: 2020-11-14 | End: 2020-11-14

## 2020-11-14 RX ORDER — INSULIN LISPRO 100 [IU]/ML
0-12 INJECTION, SOLUTION INTRAVENOUS; SUBCUTANEOUS
Status: DISCONTINUED | OUTPATIENT
Start: 2020-11-14 | End: 2020-11-14

## 2020-11-14 RX ORDER — INSULIN LISPRO 100 [IU]/ML
0-18 INJECTION, SOLUTION INTRAVENOUS; SUBCUTANEOUS
Status: DISCONTINUED | OUTPATIENT
Start: 2020-11-14 | End: 2020-11-14

## 2020-11-14 RX ORDER — INSULIN LISPRO 100 [IU]/ML
0-6 INJECTION, SOLUTION INTRAVENOUS; SUBCUTANEOUS NIGHTLY
Status: DISCONTINUED | OUTPATIENT
Start: 2020-11-14 | End: 2020-11-14

## 2020-11-14 RX ORDER — INSULIN LISPRO 100 [IU]/ML
0-9 INJECTION, SOLUTION INTRAVENOUS; SUBCUTANEOUS NIGHTLY
Status: DISCONTINUED | OUTPATIENT
Start: 2020-11-14 | End: 2020-11-14

## 2020-11-14 RX ORDER — INSULIN LISPRO 100 [IU]/ML
0-6 INJECTION, SOLUTION INTRAVENOUS; SUBCUTANEOUS NIGHTLY
Status: DISCONTINUED | OUTPATIENT
Start: 2020-11-14 | End: 2020-11-16

## 2020-11-14 RX ORDER — INSULIN LISPRO 100 [IU]/ML
0-12 INJECTION, SOLUTION INTRAVENOUS; SUBCUTANEOUS
Status: DISCONTINUED | OUTPATIENT
Start: 2020-11-14 | End: 2020-11-16

## 2020-11-14 RX ADMIN — HEPARIN SODIUM 5000 UNITS: 5000 INJECTION INTRAVENOUS; SUBCUTANEOUS at 14:53

## 2020-11-14 RX ADMIN — Medication 10 ML: at 09:30

## 2020-11-14 RX ADMIN — DEXAMETHASONE 6 MG: 4 TABLET ORAL at 09:28

## 2020-11-14 RX ADMIN — MULTIPLE VITAMINS W/ MINERALS TAB 1 TABLET: TAB at 09:28

## 2020-11-14 RX ADMIN — LEVOTHYROXINE SODIUM 100 MCG: 0.1 TABLET ORAL at 06:49

## 2020-11-14 RX ADMIN — INSULIN LISPRO 10 UNITS: 100 INJECTION, SOLUTION INTRAVENOUS; SUBCUTANEOUS at 09:32

## 2020-11-14 RX ADMIN — PANTOPRAZOLE SODIUM 40 MG: 40 TABLET, DELAYED RELEASE ORAL at 06:49

## 2020-11-14 RX ADMIN — INSULIN HUMAN 10 UNITS: 100 INJECTION, SUSPENSION SUBCUTANEOUS at 09:33

## 2020-11-14 RX ADMIN — AMLODIPINE BESYLATE 10 MG: 10 TABLET ORAL at 09:32

## 2020-11-14 RX ADMIN — HEPARIN SODIUM 5000 UNITS: 5000 INJECTION INTRAVENOUS; SUBCUTANEOUS at 06:27

## 2020-11-14 RX ADMIN — CITALOPRAM HYDROBROMIDE 10 MG: 10 TABLET ORAL at 09:27

## 2020-11-14 RX ADMIN — TRAZODONE HYDROCHLORIDE 50 MG: 50 TABLET, FILM COATED ORAL at 20:55

## 2020-11-14 RX ADMIN — SODIUM BICARBONATE: 84 INJECTION, SOLUTION INTRAVENOUS at 10:46

## 2020-11-14 RX ADMIN — PRAVASTATIN SODIUM 80 MG: 80 TABLET ORAL at 09:29

## 2020-11-14 RX ADMIN — HEPARIN SODIUM 5000 UNITS: 5000 INJECTION INTRAVENOUS; SUBCUTANEOUS at 21:00

## 2020-11-14 RX ADMIN — INSULIN LISPRO 12 UNITS: 100 INJECTION, SOLUTION INTRAVENOUS; SUBCUTANEOUS at 19:21

## 2020-11-14 RX ADMIN — DICYCLOMINE HYDROCHLORIDE 20 MG: 20 TABLET ORAL at 09:27

## 2020-11-14 RX ADMIN — INSULIN LISPRO 12 UNITS: 100 INJECTION, SOLUTION INTRAVENOUS; SUBCUTANEOUS at 13:19

## 2020-11-14 RX ADMIN — INSULIN HUMAN 10 UNITS: 100 INJECTION, SOLUTION PARENTERAL at 14:49

## 2020-11-14 RX ADMIN — MIRTAZAPINE 15 MG: 15 TABLET, FILM COATED ORAL at 20:55

## 2020-11-14 RX ADMIN — INSULIN LISPRO 5 UNITS: 100 INJECTION, SOLUTION INTRAVENOUS; SUBCUTANEOUS at 20:58

## 2020-11-14 RX ADMIN — INSULIN LISPRO 8 UNITS: 100 INJECTION, SOLUTION INTRAVENOUS; SUBCUTANEOUS at 09:33

## 2020-11-14 RX ADMIN — LISINOPRIL 20 MG: 20 TABLET ORAL at 13:22

## 2020-11-14 ASSESSMENT — PAIN SCALES - GENERAL
PAINLEVEL_OUTOF10: 0

## 2020-11-14 NOTE — PROGRESS NOTES
Progress Note  PGY-1    CC: Diarrhea (1 week), lower abdominal pain, SOB  Patient's PCP: Deyvi Pitt MD    Interval History     No acute events overnight. This morning glucose is 452. We changed the Lantus at night from 20U to 30U, low sliding scale to medium sliding scale. We also gave NPH insulin once. Bicarb was given to correct the non anion metabolic acidosis. Patient was seen at bedside. She still complain about mild SOB and little productive cough (clear) but denies chest pain, headache, nausea and vomiting. MEDICATIONS:     No current facility-administered medications on file prior to encounter.       Current Outpatient Medications on File Prior to Encounter   Medication Sig Dispense Refill    insulin glargine (LANTUS SOLOSTAR) 100 UNIT/ML injection pen Inject 20 Units into the skin 2 times daily      insulin lispro, 1 Unit Dial, (HUMALOG KWIKPEN) 100 UNIT/ML SOPN Inject 25-30 Units into the skin 3 times daily (before meals) Per Home Sliding scale      polyethyl glycol-propyl glycol 0.4-0.3 % (SYSTANE) 0.4-0.3 % ophthalmic solution Place 1-2 drops into both eyes as needed for Dry Eyes      dicyclomine (BENTYL) 20 MG tablet TAKE 1 TABLET BY MOUTH DAILY 30 tablet 1    omeprazole (PRILOSEC) 40 MG delayed release capsule Take 1 capsule by mouth daily 90 capsule 0    citalopram (CELEXA) 10 MG tablet Take 1 tablet by mouth daily 30 tablet 3    STOOL SOFTENER 100 MG capsule TAKE 1 CAPSULE BY MOUTH TWICE DAILY 60 capsule 0    traZODone (DESYREL) 50 MG tablet TAKE 1 TABLET BY MOUTH DAILY 90 tablet 0    pravastatin (PRAVACHOL) 80 MG tablet TAKE 1 TABLET BY MOUTH DAILY 90 tablet 0    lisinopril (PRINIVIL;ZESTRIL) 20 MG tablet TAKE 1 TABLET BY MOUTH DAILY 90 tablet 0    mirtazapine (REMERON) 15 MG tablet TAKE 1/2 TABLET BY MOUTH EVERY NIGHT AT BEDTIME 45 tablet 0    levothyroxine (SYNTHROID) 100 MCG tablet Take 1 tablet by mouth Daily 90 tablet 0    Multiple Vitamins-Minerals (THERAPEUTIC MULTIVITAMIN-MINERALS) tablet Take 1 tablet by mouth daily.  Insulin Pen Needle 30G X 8 MM MISC 1 each by Does not apply route daily 100 each 3    SAFE-T-PRO LANCETS MISC TEST FOUR TIMES DAILY 400 each 1    Insulin Pen Needle (NOVOFINE AUTOCOVER) 30G X 8 MM MISC USE FOUR TIMES DAILY 400 each 1         Scheduled Meds:   insulin glargine  30 Units Subcutaneous Nightly    insulin lispro  0-12 Units Subcutaneous TID WC    insulin lispro  0-6 Units Subcutaneous Nightly    lisinopril  20 mg Oral Daily    citalopram  10 mg Oral Daily    levothyroxine  100 mcg Oral QAM AC    mirtazapine  15 mg Oral Nightly    therapeutic multivitamin-minerals  1 tablet Oral Daily    pantoprazole  40 mg Oral QAM AC    pravastatin  80 mg Oral Daily    traZODone  50 mg Oral Nightly    dicyclomine  20 mg Oral Daily    sodium chloride flush  10 mL Intravenous 2 times per day    dexamethasone  6 mg Oral Daily    heparin (porcine)  5,000 Units Subcutaneous 3 times per day    amLODIPine  10 mg Oral Daily      Continuous Infusions:   IV infusion builder 50 mL/hr at 11/14/20 1046    dextrose       PRN Meds:glucose, dextrose, glucagon (rDNA), dextrose, sodium chloride flush, acetaminophen **OR** acetaminophen, polyethylene glycol, promethazine **OR** ondansetron, labetalol, hydrALAZINE    Allergies: Allergies   Allergen Reactions    Lipitor [Atorvastatin]     Nickel Rash     Jewelry & foods some 20+ yrs ago, but eats everything now without return of rash    Oxycodone-Acetaminophen Nausea And Vomiting     Patient states \"oxycodone is okay\" for her to take       PHYSICAL EXAM:       Vitals: BP (!) 152/64   Pulse 65   Temp 97.3 °F (36.3 °C) (Oral)   Resp 18   Ht 5' 6\" (1.676 m)   Wt 197 lb (89.4 kg)   SpO2 96%   BMI 31.80 kg/m²     I/O:  No intake or output data in the 24 hours ending 11/14/20 1205  No intake/output data recorded. No intake/output data recorded.     Physical Examination:   ? General appearance: alert.  ? Skin: Skin is dry. ? HEENT: Head: no lesions. ? Neck: no tenderness/mass/nodules. ? Lungs: clear to auscultation bilaterally. ? Heart: no murmur, click, rub or gallop. ? Abdomen: soft, non-tender; bowel sounds normal.  ? Extremities: extremities normal.  ? Neurologic: CN II-XII grossly intact. Mental status: Alert, oriented, thought content appropriate. DATA:       Labs:  CBC:   Recent Labs     11/13/20  1348 11/14/20  0810   WBC 3.9* 4.1   HGB 13.0 13.0   HCT 39.6 38.9    211       BMP:   Recent Labs     11/13/20  1348 11/13/20  2139 11/14/20  0648   *  --  131*   K 4.0  --  4.9   CL 97*  --  100   CO2 17*  --  17*   BUN 18  --  16   CREATININE 1.4*  --  1.5*   GLUCOSE 313* 174 452*     LFT's:   Recent Labs     11/13/20  1348   AST 32   ALT 16   BILITOT 0.3   ALKPHOS 79     Troponin: No results for input(s): TROPONINI in the last 72 hours. BNP: No results for input(s): BNP in the last 72 hours. ABGs: No results for input(s): PHART, KMQ3PNQ, PO2ART in the last 72 hours. INR: No results for input(s): INR in the last 72 hours. Lipids: No results for input(s): CHOL, HDL in the last 72 hours. Invalid input(s): LDLCALCU    U/A:  Recent Labs     11/13/20  1618   COLORU Yellow   PHUR 6.0   WBCUA 10-20*   RBCUA 0-2   MUCUS 1+*   BACTERIA 2+*   CLARITYU Clear   SPECGRAV 1.020   LEUKOCYTESUR TRACE*   UROBILINOGEN 2.0*   BILIRUBINUR Negative   BLOODU Negative   GLUCOSEU 500*       Rad:  CT CHEST ABDOMEN PELVIS W CONTRAST   Final Result      CHEST:      1. Diffuse bilateral groundglass pulmonary infiltrates with interlobular septal thickening. Findings may be seen in the setting of atypical pneumonia such as Covid-19 pneumonia. Correlation is advised. ABDOMEN/PELVIS:      1. Areas of bladder wall thickening suggested. Correlation with urinalysis.  Consideration of urologic evaluation/cystoscopy may be beneficial.     2. Fluid density noted within the colon, concurrent with history of diarrhea. XR CHEST PORTABLE   Final Result   1. Multifocal airspace disease, right upper lobe and left lower lobe. ASSESSMENT AND PLAN:   Opal Arthur is a [de-identified] y.o. female, who was presented with acute hypoxic respiratory failure.     Acute hypoxic respiratory failure possibly 2/2 COVID-19  Possible COVID-19 pneumonia  Patient desaturated to high 80s when she walks. CT chest w/ contrast (11/13) showed diffuse b/l groundglass pulmonary infiltrates with interlobular septal thickening.  - O2 as needed  - F/u COVID-19 results  - Decadron 6 mg for 5 days (2nd day- 11/13)  - F/u Procalcitonin  - Droplet plus isolation  - If worsening hypoxia, consider Pulmonology consultation and Remdisivir     Diarrhea likely 2/2 COVID-19 pneumonia  Denied blood and mentioned that she went to the bathroom for 4-5 times a day. Crampy right lower abdominal pain. C diff negative. At home on Dicyclomine. Presented to ED on 01/16/2020 with similar abdominal pain and diarrhea. Non anion gap metabolic acidosis. - as above  - F/u GI bacterial pathogens by PCR  - F/u Procalcitonin  - F/u I's/O's     Non anion metabolic acidosis 2/2 diarrhea  Bicar 17, anion gap 14.  - Bicarb at 50 ml/hr for 20 hr    LLQ abdominal pain probably 2/2 diarrhea  Pain started with diarrhea. CT abdomen w/ contrast (11/13) showed area of bladder wall thickening- non concerning.  - F/u I's/O's     Mild hyponatremia 2/2 dehydration and hyperglycemia  Na on admission 132. - Treatment for hyperglycemia  - F/u daily BMP  - F/u I's/O's     Hypertensive urgency  HTN  SBP in . - F/u VS  - Continue home Lisinopril 20 mg daily (nephroprotective)  - Amlodipine 10 mg   - PRN Labetalol 10 mg Q6hr PRN and Hydralazine 5 mg Q6hr PRN    CKD stage 3b due to diabetes and HTN  GFR in Jan 36, crea 1.4. On admission GFR 33, crea 1.4.  Proteinuria 100.  - Continue home Lisinopril 20 mg daily (nephroprotective)     Lactic acidosis 2/2 dehydration- resolved  LA on admission 2.5, Bicarb 17, anion gap 18. - Trend LA Q6hr x2  - F/u I's/O's     Hyperglycemia in the settings of uncontrolled T2DM (insulin dependent)  On admission glucose 313. At home on Lantus 20 mg BID, Lispro, insulin. HbA1C (08/27/2020): 7.3%. No ketones in urine.  - Decadron was started- monitor glucose  - Daily BMP  - Hypoglycemia protocol  - POCT glucose  - Diabetic diet  - LSSI     HLD  LDL 98, cholesterol 179, triglyceride 195.   - Continue home Pravastatin 80 mg daily     Hypothyroidism   TSH (05/28/2020): 17.96.  - Continue home Levothyroxine 100 MCG daily     GERD  At home on Omeprazole 40 mg.  - Protonix 40 mg daily     MDD  - Continue home Citalopram 10 mg daily, Mirtazapine 15 mg daily and Trazodone 50 mg daily     PT/OT eval         Code Status:Prior  FEN: Diabetic diet, bicarb at 50 ml/hr  PPX: Heparin 5000 U SubQ, SCD's  DISPO: F    This patient has been staffed and discussed with Daniella Nails MD.   -----------------------------  Louie Gitelman, MD, PGY-1  Internal Medicine

## 2020-11-14 NOTE — ED NOTES
Patient resting comfortably in locked and low bed. Verified medications given per MAR. No concerns voiced at this time.   CRISTIAN Schwartz RN  11/13/20 6423

## 2020-11-14 NOTE — ED NOTES
Report given to receiving RN. Patient will be transferred to 4 PCU room 4452.      Russell Moon RN  11/13/20 0479

## 2020-11-15 LAB
ANION GAP SERPL CALCULATED.3IONS-SCNC: 10 MMOL/L (ref 3–16)
BASOPHILS ABSOLUTE: 0 K/UL (ref 0–0.2)
BASOPHILS RELATIVE PERCENT: 0.2 %
BUN BLDV-MCNC: 22 MG/DL (ref 7–20)
CALCIUM SERPL-MCNC: 8.5 MG/DL (ref 8.3–10.6)
CHLORIDE BLD-SCNC: 102 MMOL/L (ref 99–110)
CO2: 22 MMOL/L (ref 21–32)
CREAT SERPL-MCNC: 1.3 MG/DL (ref 0.6–1.2)
EOSINOPHILS ABSOLUTE: 0 K/UL (ref 0–0.6)
EOSINOPHILS RELATIVE PERCENT: 0 %
GFR AFRICAN AMERICAN: 48
GFR NON-AFRICAN AMERICAN: 39
GI BACTERIAL PATHOGENS BY PCR: NORMAL
GLUCOSE BLD-MCNC: 131 MG/DL (ref 70–99)
GLUCOSE BLD-MCNC: 143 MG/DL (ref 70–99)
GLUCOSE BLD-MCNC: 283 MG/DL (ref 70–99)
GLUCOSE BLD-MCNC: 298 MG/DL (ref 70–99)
GLUCOSE BLD-MCNC: 321 MG/DL (ref 70–99)
HCT VFR BLD CALC: 34.9 % (ref 36–48)
HEMOGLOBIN: 11.7 G/DL (ref 12–16)
LYMPHOCYTES ABSOLUTE: 1.1 K/UL (ref 1–5.1)
LYMPHOCYTES RELATIVE PERCENT: 12.5 %
MCH RBC QN AUTO: 31.4 PG (ref 26–34)
MCHC RBC AUTO-ENTMCNC: 33.6 G/DL (ref 31–36)
MCV RBC AUTO: 93.6 FL (ref 80–100)
MONOCYTES ABSOLUTE: 0.6 K/UL (ref 0–1.3)
MONOCYTES RELATIVE PERCENT: 7 %
NEUTROPHILS ABSOLUTE: 6.8 K/UL (ref 1.7–7.7)
NEUTROPHILS RELATIVE PERCENT: 80.3 %
PDW BLD-RTO: 13.4 % (ref 12.4–15.4)
PERFORMED ON: ABNORMAL
PLATELET # BLD: 278 K/UL (ref 135–450)
PMV BLD AUTO: 8.1 FL (ref 5–10.5)
POTASSIUM REFLEX MAGNESIUM: 4.7 MMOL/L (ref 3.5–5.1)
RBC # BLD: 3.73 M/UL (ref 4–5.2)
SARS-COV-2, PCR: DETECTED
SODIUM BLD-SCNC: 134 MMOL/L (ref 136–145)
WBC # BLD: 8.5 K/UL (ref 4–11)

## 2020-11-15 PROCEDURE — 6360000002 HC RX W HCPCS: Performed by: STUDENT IN AN ORGANIZED HEALTH CARE EDUCATION/TRAINING PROGRAM

## 2020-11-15 PROCEDURE — 85025 COMPLETE CBC W/AUTO DIFF WBC: CPT

## 2020-11-15 PROCEDURE — 2060000000 HC ICU INTERMEDIATE R&B

## 2020-11-15 PROCEDURE — 2580000003 HC RX 258: Performed by: STUDENT IN AN ORGANIZED HEALTH CARE EDUCATION/TRAINING PROGRAM

## 2020-11-15 PROCEDURE — 80048 BASIC METABOLIC PNL TOTAL CA: CPT

## 2020-11-15 PROCEDURE — 6370000000 HC RX 637 (ALT 250 FOR IP): Performed by: STUDENT IN AN ORGANIZED HEALTH CARE EDUCATION/TRAINING PROGRAM

## 2020-11-15 RX ORDER — DEXAMETHASONE 6 MG/1
6 TABLET ORAL DAILY
Qty: 2 TABLET | Refills: 0 | DISCHARGE
Start: 2020-11-16 | End: 2020-11-18

## 2020-11-15 RX ORDER — GREEN TEA/HOODIA GORDONII 315-12.5MG
1 CAPSULE ORAL DAILY
Qty: 30 TABLET | Refills: 0 | DISCHARGE
Start: 2020-11-15 | End: 2020-12-15

## 2020-11-15 RX ADMIN — PANTOPRAZOLE SODIUM 40 MG: 40 TABLET, DELAYED RELEASE ORAL at 06:37

## 2020-11-15 RX ADMIN — LISINOPRIL 20 MG: 20 TABLET ORAL at 09:43

## 2020-11-15 RX ADMIN — DICYCLOMINE HYDROCHLORIDE 20 MG: 20 TABLET ORAL at 09:44

## 2020-11-15 RX ADMIN — CITALOPRAM HYDROBROMIDE 10 MG: 10 TABLET ORAL at 09:44

## 2020-11-15 RX ADMIN — DEXAMETHASONE 6 MG: 4 TABLET ORAL at 09:49

## 2020-11-15 RX ADMIN — INSULIN LISPRO 4 UNITS: 100 INJECTION, SOLUTION INTRAVENOUS; SUBCUTANEOUS at 22:26

## 2020-11-15 RX ADMIN — MIRTAZAPINE 15 MG: 15 TABLET, FILM COATED ORAL at 22:25

## 2020-11-15 RX ADMIN — AMLODIPINE BESYLATE 10 MG: 10 TABLET ORAL at 09:44

## 2020-11-15 RX ADMIN — PRAVASTATIN SODIUM 80 MG: 80 TABLET ORAL at 10:07

## 2020-11-15 RX ADMIN — MULTIPLE VITAMINS W/ MINERALS TAB 1 TABLET: TAB at 09:43

## 2020-11-15 RX ADMIN — HEPARIN SODIUM 5000 UNITS: 5000 INJECTION INTRAVENOUS; SUBCUTANEOUS at 14:43

## 2020-11-15 RX ADMIN — HEPARIN SODIUM 5000 UNITS: 5000 INJECTION INTRAVENOUS; SUBCUTANEOUS at 22:25

## 2020-11-15 RX ADMIN — Medication 10 ML: at 09:44

## 2020-11-15 RX ADMIN — Medication 10 ML: at 22:25

## 2020-11-15 RX ADMIN — LEVOTHYROXINE SODIUM 100 MCG: 0.1 TABLET ORAL at 06:37

## 2020-11-15 RX ADMIN — INSULIN LISPRO 6 UNITS: 100 INJECTION, SOLUTION INTRAVENOUS; SUBCUTANEOUS at 18:43

## 2020-11-15 RX ADMIN — INSULIN LISPRO 6 UNITS: 100 INJECTION, SOLUTION INTRAVENOUS; SUBCUTANEOUS at 06:00

## 2020-11-15 RX ADMIN — TRAZODONE HYDROCHLORIDE 50 MG: 50 TABLET, FILM COATED ORAL at 22:35

## 2020-11-15 RX ADMIN — HEPARIN SODIUM 5000 UNITS: 5000 INJECTION INTRAVENOUS; SUBCUTANEOUS at 06:38

## 2020-11-15 ASSESSMENT — PAIN SCALES - GENERAL
PAINLEVEL_OUTOF10: 0

## 2020-11-15 NOTE — PROGRESS NOTES
Internal Medicine Progress Note    Admit Date: 11/13/2020  Day: 2  Diet: DIET CARB CONTROL;    CC: Diarrhea    Interval history: Pt seen and examined this morning. Resting comfortably with no new complaints. She looks much improved since admission, no longer having diarrhea. Metabolic abnormalities have corrected after bicarb infusion. Renal function appears at baseline. COVID result still pending this AM, which may delay discharge. Denies any fevers, chills, chest pain, palpitations, leg swelling, cough, shortness of breath, difficulty breathing, wheezing, abdominal pain, nausea, vomiting, diarrhea.       Medications:     Scheduled Meds:   insulin glargine  30 Units Subcutaneous Nightly    lisinopril  20 mg Oral Daily    insulin lispro  0-12 Units Subcutaneous TID WC    insulin lispro  0-6 Units Subcutaneous Nightly    citalopram  10 mg Oral Daily    levothyroxine  100 mcg Oral QAM AC    mirtazapine  15 mg Oral Nightly    therapeutic multivitamin-minerals  1 tablet Oral Daily    pantoprazole  40 mg Oral QAM AC    pravastatin  80 mg Oral Daily    traZODone  50 mg Oral Nightly    dicyclomine  20 mg Oral Daily    sodium chloride flush  10 mL Intravenous 2 times per day    dexamethasone  6 mg Oral Daily    heparin (porcine)  5,000 Units Subcutaneous 3 times per day    amLODIPine  10 mg Oral Daily     Continuous Infusions:   dextrose       PRN Meds:glucose, dextrose, glucagon (rDNA), dextrose, sodium chloride flush, acetaminophen **OR** acetaminophen, polyethylene glycol, promethazine **OR** ondansetron, labetalol, hydrALAZINE    Objective:   Vitals:   T-max:  Patient Vitals for the past 8 hrs:   BP Temp Temp src Pulse Resp SpO2   11/15/20 0829 (!) 150/67 98.7 °F (37.1 °C) Oral 78 18 95 %   11/15/20 0430 (!) 151/53 98.9 °F (37.2 °C) Oral 60 18 91 %       Intake/Output Summary (Last 24 hours) at 11/15/2020 1015  Last data filed at 11/14/2020 2116  Gross per 24 hour   Intake 240 ml   Output 300 ml Net -60 ml       Physical Exam  Vitals signs and nursing note reviewed. Constitutional:       General: She is not in acute distress. Appearance: Normal appearance. She is not ill-appearing or toxic-appearing. HENT:      Head: Normocephalic and atraumatic. Right Ear: External ear normal.      Left Ear: External ear normal.      Nose: Nose normal.      Mouth/Throat:      Mouth: Mucous membranes are moist.      Pharynx: Oropharynx is clear. No oropharyngeal exudate. Eyes:      General: No scleral icterus. Extraocular Movements: Extraocular movements intact. Conjunctiva/sclera: Conjunctivae normal.      Pupils: Pupils are equal, round, and reactive to light. Neck:      Musculoskeletal: Normal range of motion and neck supple. No neck rigidity. Cardiovascular:      Rate and Rhythm: Normal rate and regular rhythm. Pulses: Normal pulses. Heart sounds: No murmur. Pulmonary:      Effort: Pulmonary effort is normal. No respiratory distress. Breath sounds: Normal breath sounds. No wheezing, rhonchi or rales. Abdominal:      General: Abdomen is flat. Bowel sounds are normal. There is no distension. Tenderness: There is abdominal tenderness (diffuse lower abdominal tenderness). There is no guarding or rebound. Musculoskeletal: Normal range of motion. General: No swelling or signs of injury. Right lower leg: No edema. Left lower leg: No edema. Lymphadenopathy:      Cervical: No cervical adenopathy. Skin:     General: Skin is warm and dry. Capillary Refill: Capillary refill takes less than 2 seconds. Coloration: Skin is not pale. Findings: No rash. Neurological:      General: No focal deficit present. Mental Status: She is alert. Cranial Nerves: No cranial nerve deficit. Sensory: No sensory deficit. Motor: No weakness.       Coordination: Coordination normal.   Psychiatric:         Mood and Affect: Mood normal. Behavior: Behavior normal.         Thought Content: Thought content normal.         Judgment: Judgment normal.       LABS:    CBC:   Recent Labs     11/13/20  1348 11/14/20  0810 11/15/20  0430   WBC 3.9* 4.1 8.5   HGB 13.0 13.0 11.7*   HCT 39.6 38.9 34.9*    211 278   MCV 95.4 95.5 93.6     Renal:    Recent Labs     11/13/20  1348 11/13/20  2139 11/14/20  0648 11/15/20  0430   *  --  131* 134*   K 4.0  --  4.9 4.7   CL 97*  --  100 102   CO2 17*  --  17* 22   BUN 18  --  16 22*   CREATININE 1.4*  --  1.5* 1.3*   GLUCOSE 313* 174 452* 143*   CALCIUM 8.2*  --  8.2* 8.5   ANIONGAP 18*  --  14 10     Hepatic:   Recent Labs     11/13/20  1348   AST 32   ALT 16   BILITOT 0.3   BILIDIR <0.2   PROT 6.5   LABALBU 3.5   ALKPHOS 79     Troponin: No results for input(s): TROPONINI in the last 72 hours. BNP: No results for input(s): BNP in the last 72 hours. Lipids: No results for input(s): CHOL, HDL in the last 72 hours. Invalid input(s): LDLCALCU, TRIGLYCERIDE  ABGs:  No results for input(s): PHART, UUU9AFS, PO2ART, QWL9TRH, BEART, THGBART, H1NJQPQT, NFX4KFZ in the last 72 hours. INR: No results for input(s): INR in the last 72 hours. Lactate: No results for input(s): LACTATE in the last 72 hours. Cultures:  -----------------------------------------------------------------  RAD:   CT CHEST ABDOMEN PELVIS W CONTRAST   Final Result      CHEST:      1. Diffuse bilateral groundglass pulmonary infiltrates with interlobular septal thickening. Findings may be seen in the setting of atypical pneumonia such as Covid-19 pneumonia. Correlation is advised. ABDOMEN/PELVIS:      1. Areas of bladder wall thickening suggested. Correlation with urinalysis. Consideration of urologic evaluation/cystoscopy may be beneficial.     2. Fluid density noted within the colon, concurrent with history of diarrhea. XR CHEST PORTABLE   Final Result   1.  Multifocal airspace disease, right upper lobe and left lower lobe.            Assessment/Plan:   Vonnie Alonso a [de-identified] y.o. female, who was presented with acute hypoxic respiratory failure.     Acute hypoxic respiratory failure possibly 2/2 COVID-19  Possible COVID-19 pneumonia (RESOLVED)   Patient desaturated to high 80s when she walks. CT chest w/ contrast (11/13) showed diffuse b/l groundglass pulmonary infiltrates with interlobular septal thickening.  - O2 as needed  - F/u COVID-19 results  - Decadron 6 mg for 5 days (2nd day- 11/13)  - Droplet plus isolation     Diarrhea likely 2/2 COVID-19 pneumonia  Denied blood and mentioned that she went to the bathroom for 4-5 times a day. Crampy right lower abdominal pain. C diff negative. At home on Dicyclomine. Presented to ED on 01/16/2020 with similar abdominal pain and diarrhea. Non anion gap metabolic acidosis. - as above  - F/u GI bacterial pathogens by PCR  - F/u I's/O's     Non anion metabolic acidosis 2/2 diarrhea (RESOLVED)   Bicar 17, anion gap 14.  - Bicarb at 50 ml/hr for 20 hr     LLQ abdominal pain probably 2/2 diarrhea  Pain started with diarrhea. CT abdomen w/ contrast (11/13) showed area of bladder wall thickening- non concerning.  - negative for C diff.     Hypertensive urgency (RESOLVED)   HTN  SBP in . - F/u VS  - Continue home Lisinopril 20 mg daily (nephroprotective)  - Amlodipine 10 mg   - PRN Labetalol 10 mg Q6hr PRN and Hydralazine 5 mg Q6hr PRN     CKD stage 3b due to diabetes and HTN  GFR in Jan 36, crea 1.4. On admission GFR 33, crea 1.4. Proteinuria 100.  - Continue home Lisinopril 20 mg daily (nephroprotective)     Lactic acidosis 2/2 dehydration- resolved  LA on admission 2.5, Bicarb 17, anion gap 18. - Trend LA Q6hr x2  - F/u I's/O's     Hyperglycemia in the settings of uncontrolled T2DM (insulin dependent)  On admission glucose 313. At home on Lantus 20 mg BID, Lispro, insulin. HbA1C (08/27/2020): 7.3%.  No ketones in urine.  - Decadron was started- monitor glucose  - Daily BMP  - Hypoglycemia protocol  - POCT glucose  - Diabetic diet  - LSSI     HLD  LDL 98, cholesterol 179, triglyceride 195.   - Continue home Pravastatin 80 mg daily     Hypothyroidism   TSH (05/28/2020): 17.96.  - Continue home Levothyroxine 100 MCG daily     GERD  At home on Omeprazole 40 mg.  - Protonix 40 mg daily     MDD  - Continue home Citalopram 10 mg daily, Mirtazapine 15 mg daily and Trazodone 50 mg daily     PT/OT eval       Code Status:Full Code  FEN: DIET CARB CONTROL;  PPX:  SQH  DISPO: [facility] Verner Lerner, PGY-2  11/15/20  10:15 AM    This patient will be staffed and discussed with Kanika Frausto MD.

## 2020-11-15 NOTE — CARE COORDINATION
CM following, Spoke with Joi Elizabeth at [facility], he is checking to see if pt still has to have 2 neg COVID test to return will check with his DON and call me back. Electronically signed by Christine Navarro RN on 11/15/2020 at 11:44 AM  [phone]    UPDATE:  Spoke with Joi Elizabeth, pt can return with 1 neg COVID test.  The COVID test is still pending will cont to follow, if results neg will set up for DC back to [facility].  Electronically signed by Christine Navarro RN on 11/15/2020 at 12:10 PM  [phone]    UPDATE:   Pt COVID back +, referral sent to [facility] accepting facilities to see if they can accept pt, family Sylvie Claude [phone] called LM to return call for facility choice. PT OT will need to eval for placement. Electronically signed by Christine Navarro RN on 11/15/2020 at 12:53 PM  [phone]    UPDATE:  Spoke with Jayde she chose [address] [place] [address] or [place] if accepted by them.   Electronically signed by Christine Navarro RN on 11/15/2020 at 2:38 PM  [phone]

## 2020-11-15 NOTE — PROGRESS NOTES
Physician Progress Note      PATIENT:               Reese Michel  CSN #:                  [id number]  :                       1940  ADMIT DATE:       2020 12:50 PM  [address] [place] DATE:  RESPONDING  PROVIDER #:        Franny Mckinnon DO          QUERY TEXT:    Dear All Providers,    Patient with noted H & P documentation of acute hypoxic respiratory failure. Please add further clinical evidence to support acute hypoxic respiratory   failure or please rule out. Please indicate one of the following and document   in the medical record: The medical record reflects the following:  Risk Factors:79 yo female from [place], Suspected COVID pneumonia. Covid 19 pending  Clinical Indicators: SOB, RR 18-20 The patient desaturates to high 80s when   she walks. ED exam:  Effort: Pulmonary effort is normal. No respiratory distress.   Treatment: 02 as needed    Acute Respiratory Failure Clinical Indicators per [employer] [employer]-DR[facility] Training Guide and   Quick Reference Guide:  pO2 < 60 mmHg or SpO2 (pulse oximetry) < 91% breathing room air  pCO2 > 50 and pH < 7.35  P/F ratio (pO2 / FIO2) < 300  pO2 decrease or pCO2 increase by 10 mmHg from baseline (if known)  Supplemental oxygen of 40% or more  Presence of respiratory distress, tachypnea, dyspnea, shortness of breath,   wheezing  Unable to speak in complete sentences  Use of accessory muscles to breathe  Extreme anxiety and feeling of impending doom  Tripod position  Confusion/altered mental status/obtunded  Options provided:  -- Acute Hypoxic Respiratory Failure currently as evidenced by, Please   document evidence  -- Acute Respiratory Failure ruled out after study  -- Other - I will add my own diagnosis  -- Disagree - Not applicable / Not valid  -- Disagree - Clinically unable to determine / Unknown  -- Refer to Clinical Documentation Reviewer    PROVIDER RESPONSE TEXT:    Acute Hypoxic Respiratory Failure present on arrival, resolved after moderate   clinical support in the inpatient setting. At this time has clinically   resolved.     Query created by: Elvia Reyes on 11/14/2020 2:02 PM      Electronically signed by:  Keira Davis DO 11/15/2020 10:08 AM

## 2020-11-15 NOTE — DISCHARGE SUMMARY
INTERNAL MEDICINE    RESIDENT DISCHARGE SUMMARY   Discharge Summaries    The patient was seen and examined on day of discharge and this discharge summary is in conjunction with any daily progress note from day of discharge. Patient ID: Deliah Kussmaul   Gender: female      :  1940  AGE: [de-identified] y.o. MRN:  [id number]  Code Status: DNR-CCA   PCP: Jyoti Jaramillo MD    Disposition: To a non-[facility] facility    Admit date:  2020   Discharge date:  20     Admitting Physician:  Kanika Frausto MD  Discharge Physician: Gael Salinas MD     Admission Condition:  good  Discharged Condition:  good Stable    ADMISSION DIAGNOSES:  Present on Admission:   Suspected COVID-19 virus infection      DISCHARGE DIAGNOSES:  C/Gwendolyn De La Vega [address] Problems    Diagnosis Date Noted    Suspected COVID-19 virus infection [Z20.828] 2020     Patient Active Problem List   Diagnosis    Hypertension    Hyperlipidemia    Hypothyroidism due to acquired atrophy of thyroid    Depression    Low back pain    DDD (degenerative disc disease), lumbosacral    Stenosis, spinal, lumbar    Almonte's esophagus without dysplasia    Urinary frequency    Type 2 diabetes mellitus treated with insulin ([facility]    Major depressive disorder, recurrent, moderate ([facility]    Suspected COVID-19 virus infection         Hospital Course:    Mrs. Kory Kaur, [de-identified] y/o, female with hx of insulin dependent T2DM, HTN, HLD, MDD, hypothyroidism and GERD presented from  [facility] with diarrhea that lasted for one week, left lower abdominal cramping pain and SOB. No melena or hematochezia. There was notable dyspnea on exertion and significant desaturation to 85% on room air with ambulation. For these reasons she was admitted to the hospital.   She was tested for COVID and started on decadron 10mg daily for 5 days. Her oxygen requirement was minimal, and she rapidly improved back to room air. Her diarrhea quickly resolved.  There were mild metabolic abnormalities including hyponatremia and NAGMA from diarrhea. These were addressed with IVF. Her BG was not well controlled due to addition of Decadron. Her home Lantus was increased to 40 units and SSI was increased to high dose. She was instructed to follow-up with her PCP in 3 days to reassess BG given she completed day 5 of 5 on day of discharge. She was discharged back to her facility in stable condition. Physical Exam Performed:     BP (!) 161/70   Pulse 62   Temp 96.4 °F (35.8 °C) (Oral)   Resp 20   Ht 5' 6\" (1.676 m)   Wt 193 lb 11.2 oz (87.9 kg)   SpO2 95%   BMI 31.26 kg/m²     Physical Exam  Vitals signs and nursing note reviewed. Constitutional:       General: She is not in acute distress. Appearance: Normal appearance. She is not ill-appearing or toxic-appearing. HENT:      Head: Normocephalic and atraumatic. Right Ear: External ear normal.      Left Ear: External ear normal.      Nose: Nose normal.      Mouth/Throat:      Mouth: Mucous membranes are moist.      Pharynx: Oropharynx is clear. No oropharyngeal exudate. Eyes:      General: No scleral icterus. Extraocular Movements: Extraocular movements intact. Conjunctiva/sclera: Conjunctivae normal.      Pupils: Pupils are equal, round, and reactive to light. Neck:      Musculoskeletal: Normal range of motion and neck supple. No neck rigidity. Cardiovascular:      Rate and Rhythm: Normal rate and regular rhythm. Pulses: Normal pulses. Heart sounds: No murmur. Pulmonary:      Effort: Pulmonary effort is normal. No respiratory distress. Breath sounds: Normal breath sounds. No wheezing, rhonchi or rales. Abdominal:      General: Abdomen is flat. Bowel sounds are normal. There is no distension. Tenderness: There is no abdominal tenderness. There is no guarding or rebound. Musculoskeletal: Normal range of motion. General: No swelling or signs of injury.       Right lower leg: No edema. Left lower leg: No edema. Lymphadenopathy:      Cervical: No cervical adenopathy. Skin:     General: Skin is warm and dry. Capillary Refill: Capillary refill takes less than 2 seconds. Coloration: Skin is not pale. Findings: No rash. Neurological:      General: No focal deficit present. Mental Status: She is alert. Cranial Nerves: No cranial nerve deficit. Sensory: No sensory deficit. Motor: No weakness. Coordination: Coordination normal.   Psychiatric:         Mood and Affect: Mood normal.         Behavior: Behavior normal.         Thought Content: Thought content normal.         Judgment: Judgment normal.         Labs: For convenience and continuity at follow-up the following most recent labs are provided:      CBC:    Lab Results   Component Value Date    WBC 7.8 11/17/2020    HGB 12.7 11/17/2020    HCT 38.9 11/17/2020     11/17/2020       Renal:    Lab Results   Component Value Date     11/17/2020    K 4.5 11/17/2020     11/17/2020    CO2 20 11/17/2020    BUN 32 11/17/2020    CREATININE 1.3 11/17/2020    CALCIUM 8.7 11/17/2020    PHOS 2.8 02/23/2019         Significant Diagnostic Studies    Radiology:   CT CHEST ABDOMEN PELVIS W CONTRAST   Final Result      CHEST:      1. Diffuse bilateral groundglass pulmonary infiltrates with interlobular septal thickening. Findings may be seen in the setting of atypical pneumonia such as Covid-19 pneumonia. Correlation is advised. ABDOMEN/PELVIS:      1. Areas of bladder wall thickening suggested. Correlation with urinalysis. Consideration of urologic evaluation/cystoscopy may be beneficial.     2. Fluid density noted within the colon, concurrent with history of diarrhea. XR CHEST PORTABLE   Final Result   1. Multifocal airspace disease, right upper lobe and left lower lobe.              Consults:     IP CONSULT TO HOSPITALIST  IP CONSULT TO CASE MANAGEMENT      Discharge Instructions/Follow-up:    Preventing the Spread of Coronavirus Disease 2019 in Homes and Residential Communities  Interim Guidance      Prevention steps for people with confirmed or suspected COVID-19 (including persons under investigation) who do not need to be hospitalized and people with confirmed COVID-19 who were hospitalized and determined to be medically stable to go home    Your healthcare provider and public health staff will evaluate whether you can be cared for at home. If it is determined that you do not need to be hospitalized and can be isolated at home, you will be monitored by staff from your local or state health department. You should follow the prevention steps below until a healthcare provider or local or state health department says you can return to your normal activities. Stay home except to get medical care  People who are mildly ill with COVID-19 are able to isolate at home during their illness. You should restrict activities outside your home, except for getting medical care. Do not go to work, school, or public areas. Avoid using public transportation, ride-sharing, or taxis. Separate yourself from other people and animals in your home  People: As much as possible, you should stay in a specific room and away from other people in your home. Also, you should use a separate bathroom, if available. Animals: You should restrict contact with pets and other animals while you are sick with COVID-19, just like you would around other people. Although there have not been reports of pets or other animals becoming sick with COVID-19, it is still recommended that people sick with COVID-19 limit contact with animals until more information is known about the virus. When possible, have another member of your household care for your animals while you are sick. If you are sick with COVID-19, avoid contact with your pet, including petting, snuggling, being kissed or licked, and sharing food.  If you thoroughly with soap and water. Clean all high-touch surfaces everyday  High touch surfaces include counters, tabletops, doorknobs, bathroom fixtures, toilets, phones, keyboards, tablets, and bedside tables. Also, clean any surfaces that may have blood, stool, or body fluids on them. Use a household cleaning spray or wipe, according to the label instructions. Labels contain instructions for safe and effective use of the cleaning product including precautions you should take when applying the product, such as wearing gloves and making sure you have good ventilation during use of the product. Monitor your symptoms  Seek prompt medical attention if your illness is worsening (e.g., difficulty breathing). Before seeking care, call your healthcare provider and tell them that you have, or are being evaluated for, COVID-19. Put on a facemask before you enter the facility. These steps will help the healthcare providers office to keep other people in the office or waiting room from getting infected or exposed. Ask your healthcare provider to call the local or state health department. Persons who are placed under active monitoring or facilitated self-monitoring should follow instructions provided by their local health department or occupational health professionals, as appropriate. If you have a medical emergency and need to call 911, notify the dispatch personnel that you have, or are being evaluated for COVID-19. If possible, put on a facemask before emergency medical services arrive. Discontinuing home isolation  Patients with confirmed COVID-19 should remain under home isolation precautions until the risk of secondary transmission to others is thought to be low. The decision to discontinue home isolation precautions should be made on a case-by-case basis, in consultation with healthcare providers and state and local health departments.       Recommended precautions for household members, intimate partners, and caregivers in a nonhealthcare setting a patient with symptomatic laboratory-confirmed COVID-19 or a patient under investigation (PUI)    Household members, intimate partners, and caregivers in a nonhealthcare setting may have close contact with a person with symptomatic, laboratory-confirmed COVID-19 or a person under investigation. Close contacts should monitor their health; they should call their healthcare provider right away if they develop symptoms suggestive of COVID-19 (e.g., fever, cough, shortness of breath) (see Interim [place] Guidance for Risk Assessment and Public Health Management of Persons with Potential Coronavirus Disease 2019 (COVID-19) Exposure in Travel-associated or [facility].)    Close contacts should also follow these recommendations:    -Make sure that you understand and can help the patient follow their healthcare providers instructions for medication(s) and care. You should help the patient with basic needs in the home and provide support for getting groceries, prescriptions, and other personal needs. -Monitor the patients symptoms. If the patient is getting sicker, call his or her healthcare provider and tell them that the patient has laboratory-confirmed COVID-19. This will help the healthcare providers office take steps to keep other people in the office or waiting room from getting infected. Ask the healthcare provider to call the local or state health department for additional guidance. If the patient has a medical emergency and you need to call 911, notify the dispatch personnel that the patient has, or is being evaluated for COVID-19.  -Household members should stay in another room or be  from the patient as much as possible. Household members should use a separate bedroom and bathroom, if available. -Prohibit visitors who do not have an essential need to be in the home.  -Household members should care for any pets in the home.  Do not handle pets or other animals while sick. For more information, see COVID-19 and Animals. -Make sure that shared spaces in the home have good air flow, such as by an air conditioner or an opened window, weather permitting.  -Perform hand hygiene frequently. Wash your hands often with soap and water for at least 20 seconds or use an alcohol-based hand  that contains 60 to 95% alcohol, covering all surfaces of your hands and rubbing them together until they feel dry. Soap and water should be used preferentially if hands are visibly dirty.  -Avoid touching your eyes, nose, and mouth with unwashed hands.  -The patient should wear a facemask when you are around other people. If the patient is not able to wear a facemask (for example, because it causes trouble breathing), you, as the caregiver, should wear a mask when you are in the same room as the patient.  -Wear a disposable facemask and gloves when you touch or have contact with the patients blood, stool, or body fluids, such as saliva, sputum, nasal mucus, vomit, urine.  -Throw out disposable facemasks and gloves after using them. Do not reuse.  -When removing personal protective equipment, first remove and dispose of gloves. Then, immediately clean your hands with soap and water or alcohol-based hand . Next, remove and dispose of facemask, and immediately clean your hands again with soap and water or alcohol-based hand .  -Avoid sharing household items with the patient. You should not share dishes, drinking glasses, cups, eating utensils, towels, bedding, or other items. After the patient uses these items, you should wash them thoroughly (see below [employer]). -Clean all high-touch surfaces, such as counters, tabletops, doorknobs, bathroom fixtures, toilets, phones, keyboards, tablets, and bedside tables, every day. Also, clean any surfaces that may have blood, stool, or body fluids on them.   -Use a household cleaning spray or wipe, according to the label instructions. Labels contain instructions for safe and effective use of the cleaning product including precautions you should take when applying the product, such as wearing gloves and making sure you have good ventilation during use of the product.  -Wash laundry thoroughly.  -Immediately remove and wash clothes or bedding that have blood, stool, or body fluids on them.  -Wear disposable gloves while handling soiled items and keep soiled items away from your body. Clean your hands (with soap and water or an alcohol-based hand ) immediately after removing your gloves. -Read and follow directions on labels of laundry or clothing items and detergent. In general, using a normal laundry detergent according to washing machine instructions and dry thoroughly using the warmest temperatures recommended on the clothing label.  -Place all used disposable gloves, facemasks, and other contaminated items in a lined container before disposing of them with other household waste. Clean your hands (with soap and water or an alcohol-based hand ) immediately after handling these items. Soap and water should be used preferentially if hands are visibly dirty.  -Discuss any additional questions with your state or local health department or healthcare provider. Footnotes  1) Home healthcare personnel should refer to Interim Infection Prevention and Control Recommendations for Patients with Known or Patients Under Investigation for Coronavirus Disease 2019 (COVID-19) in a Healthcare Setting. 2) Close contact is defined as--    a) being within approximately 6 feet (2 meters) of a COVID-19 case for a prolonged period of time; close contact can occur while caring for, living with, visiting, or sharing a health care waiting area or room with a COVID-19 case    - or -    b) having direct contact with infectious secretions of a COVID-19 case (e.g., being coughed on).     Taken from:  [email or web address]? https://sanjuanita-page.info/    Follow-up with PCP in 3 days to assess blood sugar and adjustment of DM regimen accordingly.      Activity: activity as tolerated    Diet: regular diet    Discharge Medications:     Current Discharge Medication List           Details   dexamethasone (DECADRON) 6 MG tablet Take 1 tablet by mouth daily for 2 days  Qty: 2 tablet, Refills: 0      Probiotic Acidophilus (FLORANEX) TABS Take 1 tablet by mouth daily  Qty: 30 tablet, Refills: 0              Details   insulin glargine (LANTUS SOLOSTAR) 100 UNIT/ML injection pen Inject 40 Units into the skin nightly  Qty: 5 pen, Refills: 3              Details   insulin lispro, 1 Unit Dial, (HUMALOG KWIKPEN) 100 UNIT/ML SOPN Inject 25-30 Units into the skin 3 times daily (before meals) Per Home Sliding scale      polyethyl glycol-propyl glycol 0.4-0.3 % (SYSTANE) 0.4-0.3 % ophthalmic solution Place 1-2 drops into both eyes as needed for Dry Eyes      omeprazole (PRILOSEC) 40 MG delayed release capsule Take 1 capsule by mouth daily  Qty: 90 capsule, Refills: 0      citalopram (CELEXA) 10 MG tablet Take 1 tablet by mouth daily  Qty: 30 tablet, Refills: 3    Associated Diagnoses: Reactive depression      STOOL SOFTENER 100 MG capsule TAKE 1 CAPSULE BY MOUTH TWICE DAILY  Qty: 60 capsule, Refills: 0      traZODone (DESYREL) 50 MG tablet TAKE 1 TABLET BY MOUTH DAILY  Qty: 90 tablet, Refills: 0      pravastatin (PRAVACHOL) 80 MG tablet TAKE 1 TABLET BY MOUTH DAILY  Qty: 90 tablet, Refills: 0      lisinopril (PRINIVIL;ZESTRIL) 20 MG tablet TAKE 1 TABLET BY MOUTH DAILY  Qty: 90 tablet, Refills: 0      mirtazapine (REMERON) 15 MG tablet TAKE 1/2 TABLET BY MOUTH EVERY NIGHT AT BEDTIME  Qty: 45 tablet, Refills: 0      levothyroxine (SYNTHROID) 100 MCG tablet Take 1 tablet by mouth Daily  Qty: 90 tablet,

## 2020-11-15 NOTE — PROGRESS NOTES
Spoke with sister Davida Waller to update on the plan of care and possible discharge to SNF tomorrow.     Electronically signed by Mauro Peterson RN on 11/15/2020 at 6:05 PM

## 2020-11-16 LAB
ANION GAP SERPL CALCULATED.3IONS-SCNC: 13 MMOL/L (ref 3–16)
BASOPHILS ABSOLUTE: 0 K/UL (ref 0–0.2)
BASOPHILS RELATIVE PERCENT: 0.9 %
BUN BLDV-MCNC: 25 MG/DL (ref 7–20)
CALCIUM SERPL-MCNC: 8.3 MG/DL (ref 8.3–10.6)
CHLORIDE BLD-SCNC: 100 MMOL/L (ref 99–110)
CO2: 21 MMOL/L (ref 21–32)
CREAT SERPL-MCNC: 1.3 MG/DL (ref 0.6–1.2)
EOSINOPHILS ABSOLUTE: 0 K/UL (ref 0–0.6)
EOSINOPHILS RELATIVE PERCENT: 0 %
GFR AFRICAN AMERICAN: 48
GFR NON-AFRICAN AMERICAN: 39
GLUCOSE BLD-MCNC: 150 MG/DL (ref 70–99)
GLUCOSE BLD-MCNC: 187 MG/DL (ref 70–99)
GLUCOSE BLD-MCNC: 214 MG/DL (ref 70–99)
GLUCOSE BLD-MCNC: 340 MG/DL (ref 70–99)
GLUCOSE BLD-MCNC: 438 MG/DL (ref 70–99)
GLUCOSE BLD-MCNC: 510 MG/DL (ref 70–99)
GLUCOSE BLD-MCNC: 525 MG/DL (ref 70–99)
HCT VFR BLD CALC: 35.6 % (ref 36–48)
HEMOGLOBIN: 12.1 G/DL (ref 12–16)
LYMPHOCYTES ABSOLUTE: 0.8 K/UL (ref 1–5.1)
LYMPHOCYTES RELATIVE PERCENT: 15.8 %
MCH RBC QN AUTO: 31.9 PG (ref 26–34)
MCHC RBC AUTO-ENTMCNC: 34.1 G/DL (ref 31–36)
MCV RBC AUTO: 93.7 FL (ref 80–100)
MONOCYTES ABSOLUTE: 0.5 K/UL (ref 0–1.3)
MONOCYTES RELATIVE PERCENT: 9.2 %
NEUTROPHILS ABSOLUTE: 3.7 K/UL (ref 1.7–7.7)
NEUTROPHILS RELATIVE PERCENT: 74.1 %
PDW BLD-RTO: 13.5 % (ref 12.4–15.4)
PERFORMED ON: ABNORMAL
PLATELET # BLD: 313 K/UL (ref 135–450)
PMV BLD AUTO: 8 FL (ref 5–10.5)
POTASSIUM REFLEX MAGNESIUM: 4.6 MMOL/L (ref 3.5–5.1)
RBC # BLD: 3.8 M/UL (ref 4–5.2)
SODIUM BLD-SCNC: 134 MMOL/L (ref 136–145)
WBC # BLD: 4.9 K/UL (ref 4–11)

## 2020-11-16 PROCEDURE — 6370000000 HC RX 637 (ALT 250 FOR IP): Performed by: INTERNAL MEDICINE

## 2020-11-16 PROCEDURE — 85025 COMPLETE CBC W/AUTO DIFF WBC: CPT

## 2020-11-16 PROCEDURE — 2060000000 HC ICU INTERMEDIATE R&B

## 2020-11-16 PROCEDURE — 80048 BASIC METABOLIC PNL TOTAL CA: CPT

## 2020-11-16 PROCEDURE — 97116 GAIT TRAINING THERAPY: CPT

## 2020-11-16 PROCEDURE — 6370000000 HC RX 637 (ALT 250 FOR IP): Performed by: STUDENT IN AN ORGANIZED HEALTH CARE EDUCATION/TRAINING PROGRAM

## 2020-11-16 PROCEDURE — 2580000003 HC RX 258: Performed by: STUDENT IN AN ORGANIZED HEALTH CARE EDUCATION/TRAINING PROGRAM

## 2020-11-16 PROCEDURE — 97162 PT EVAL MOD COMPLEX 30 MIN: CPT

## 2020-11-16 PROCEDURE — 97166 OT EVAL MOD COMPLEX 45 MIN: CPT

## 2020-11-16 PROCEDURE — 6360000002 HC RX W HCPCS: Performed by: STUDENT IN AN ORGANIZED HEALTH CARE EDUCATION/TRAINING PROGRAM

## 2020-11-16 PROCEDURE — 97535 SELF CARE MNGMENT TRAINING: CPT

## 2020-11-16 RX ORDER — DEXTROSE MONOHYDRATE 50 MG/ML
100 INJECTION, SOLUTION INTRAVENOUS PRN
Status: DISCONTINUED | OUTPATIENT
Start: 2020-11-16 | End: 2020-11-16 | Stop reason: SDUPTHER

## 2020-11-16 RX ORDER — LISINOPRIL 40 MG/1
40 TABLET ORAL DAILY
Status: DISCONTINUED | OUTPATIENT
Start: 2020-11-17 | End: 2020-11-17 | Stop reason: HOSPADM

## 2020-11-16 RX ORDER — INSULIN LISPRO 100 [IU]/ML
0-9 INJECTION, SOLUTION INTRAVENOUS; SUBCUTANEOUS NIGHTLY
Status: DISCONTINUED | OUTPATIENT
Start: 2020-11-16 | End: 2020-11-17 | Stop reason: HOSPADM

## 2020-11-16 RX ORDER — DEXTROSE MONOHYDRATE 25 G/50ML
12.5 INJECTION, SOLUTION INTRAVENOUS PRN
Status: DISCONTINUED | OUTPATIENT
Start: 2020-11-16 | End: 2020-11-16 | Stop reason: SDUPTHER

## 2020-11-16 RX ORDER — INSULIN LISPRO 100 [IU]/ML
0-18 INJECTION, SOLUTION INTRAVENOUS; SUBCUTANEOUS
Status: DISCONTINUED | OUTPATIENT
Start: 2020-11-16 | End: 2020-11-17 | Stop reason: HOSPADM

## 2020-11-16 RX ORDER — NICOTINE POLACRILEX 4 MG
15 LOZENGE BUCCAL PRN
Status: DISCONTINUED | OUTPATIENT
Start: 2020-11-16 | End: 2020-11-16 | Stop reason: SDUPTHER

## 2020-11-16 RX ORDER — INSULIN LISPRO 100 [IU]/ML
0.08 INJECTION, SOLUTION INTRAVENOUS; SUBCUTANEOUS
Status: DISCONTINUED | OUTPATIENT
Start: 2020-11-16 | End: 2020-11-17 | Stop reason: HOSPADM

## 2020-11-16 RX ADMIN — INSULIN HUMAN 10 UNITS: 100 INJECTION, SOLUTION PARENTERAL at 14:51

## 2020-11-16 RX ADMIN — INSULIN LISPRO 18 UNITS: 100 INJECTION, SOLUTION INTRAVENOUS; SUBCUTANEOUS at 13:17

## 2020-11-16 RX ADMIN — INSULIN LISPRO 7 UNITS: 100 INJECTION, SOLUTION INTRAVENOUS; SUBCUTANEOUS at 13:18

## 2020-11-16 RX ADMIN — Medication 10 ML: at 21:54

## 2020-11-16 RX ADMIN — MULTIPLE VITAMINS W/ MINERALS TAB 1 TABLET: TAB at 09:37

## 2020-11-16 RX ADMIN — INSULIN LISPRO 2 UNITS: 100 INJECTION, SOLUTION INTRAVENOUS; SUBCUTANEOUS at 21:42

## 2020-11-16 RX ADMIN — DICYCLOMINE HYDROCHLORIDE 20 MG: 20 TABLET ORAL at 09:38

## 2020-11-16 RX ADMIN — LEVOTHYROXINE SODIUM 100 MCG: 0.1 TABLET ORAL at 05:33

## 2020-11-16 RX ADMIN — DEXAMETHASONE 6 MG: 4 TABLET ORAL at 09:38

## 2020-11-16 RX ADMIN — TRAZODONE HYDROCHLORIDE 50 MG: 50 TABLET, FILM COATED ORAL at 21:42

## 2020-11-16 RX ADMIN — PANTOPRAZOLE SODIUM 40 MG: 40 TABLET, DELAYED RELEASE ORAL at 06:15

## 2020-11-16 RX ADMIN — HYDRALAZINE HYDROCHLORIDE 5 MG: 20 INJECTION INTRAMUSCULAR; INTRAVENOUS at 01:21

## 2020-11-16 RX ADMIN — CITALOPRAM HYDROBROMIDE 10 MG: 10 TABLET ORAL at 09:38

## 2020-11-16 RX ADMIN — MIRTAZAPINE 15 MG: 15 TABLET, FILM COATED ORAL at 21:42

## 2020-11-16 RX ADMIN — HEPARIN SODIUM 5000 UNITS: 5000 INJECTION INTRAVENOUS; SUBCUTANEOUS at 14:51

## 2020-11-16 RX ADMIN — INSULIN LISPRO 6 UNITS: 100 INJECTION, SOLUTION INTRAVENOUS; SUBCUTANEOUS at 17:45

## 2020-11-16 RX ADMIN — Medication 10 ML: at 09:42

## 2020-11-16 RX ADMIN — LISINOPRIL 20 MG: 20 TABLET ORAL at 09:37

## 2020-11-16 RX ADMIN — INSULIN LISPRO 7 UNITS: 100 INJECTION, SOLUTION INTRAVENOUS; SUBCUTANEOUS at 17:48

## 2020-11-16 RX ADMIN — INSULIN LISPRO 12 UNITS: 100 INJECTION, SOLUTION INTRAVENOUS; SUBCUTANEOUS at 09:36

## 2020-11-16 RX ADMIN — HEPARIN SODIUM 5000 UNITS: 5000 INJECTION INTRAVENOUS; SUBCUTANEOUS at 21:42

## 2020-11-16 RX ADMIN — HEPARIN SODIUM 5000 UNITS: 5000 INJECTION INTRAVENOUS; SUBCUTANEOUS at 05:32

## 2020-11-16 RX ADMIN — AMLODIPINE BESYLATE 10 MG: 10 TABLET ORAL at 09:38

## 2020-11-16 RX ADMIN — PRAVASTATIN SODIUM 80 MG: 80 TABLET ORAL at 09:38

## 2020-11-16 ASSESSMENT — PAIN SCALES - GENERAL
PAINLEVEL_OUTOF10: 0

## 2020-11-16 NOTE — PROGRESS NOTES
Physical Therapy    Facility/Department: [place] [address] [facility]  Initial Assessment and Treatment    NAME: Kelsey Garcia  : 1940  MRN: [id number]    Date of Service: 2020    Discharge Recommendations:  Kelsey Garcia scored a 20/24 on the AM-PAC short mobility form. Current research shows that an AM-PAC score of 18 or greater is typically associated with a discharge to the patient's home setting. Based on the patient's AM-PAC score and their current functional mobility deficits, it is recommended that the patient have 2-3 sessions per week of Physical Therapy at d/c to increase the patient's independence. At this time, this patient demonstrates the endurance and safety to discharge home with HOME PT. Please see assessment section for further patient specific details. PT Equipment Recommendations  Equipment Needed: No    Assessment   Assessment: Pt from assisted living with COVID-19. Pt demonstrates transfers and gait close to functional baseline. Pt with increased balance ambulating with rolling walker vs no assistive device. Recommend ongoing use of rolling walker upon d/c. Activity tolerance is limited by mild dyspnea. SpO2 98% on room air after activity. Pt demonstrates good safety awareness and insight with mobility. Pt plans to go to SNF at d/c. If home, recommend increased assist from assisted living staff and home PT. Will follow. Treatment Diagnosis: Decreased gait secondary to COVID-19. Decision Making: Medium Complexity  Patient Education: Role of PT and activity promotion with RN staff. Pt verbalized understanding. REQUIRES PT FOLLOW UP: Yes     Restrictions  Up with assist     Vision/Hearing  Vision Exceptions: Wears glasses at all times  Hearing Exceptions: Bilateral hearing aid       Subjective  Chart Reviewed: Yes  Additional Pertinent Hx: Pt to ED  with 1 week history of diarrhea and lower abdominal cramping. Pt admitted for suspected COVID.   Abd CT:Diffuse bilateral groundglass Good  Standing - Static: Fair  Standing - Dynamic: Fair(using rolling walker for mobility)    Treatment included gait and transfer training with patient education     Plan   Times per week: 1-2  Current Treatment Recommendations: Transfer Training, Gait Training, Functional Mobility Training, Strengthening    Safety Devices  Type of devices: Left in chair, Chair alarm in place, Call light within reach, Nurse notified      AM-PAC Score  AM-PAC Inpatient Mobility Raw Score : 20 (11/16/20 0848)  AM-PAC Inpatient T-Scale Score : 47.67 (11/16/20 0848)  Mobility Inpatient CMS 0-100% Score: 35.83 (11/16/20 0848)  Mobility Inpatient CMS G-Code Modifier : Alec Liner (11/16/20 0848)    Goals  Short term goals  Time Frame for Short term goals: Discharge  Short term goal 1: sit <> stand modified independent  Short term goal 2: ambulate 100ft with rolling walker supervision  Patient Goals   Patient goals : Return home       Therapy Time   Individual Concurrent Group Co-treatment   Time In 0810         Time Out 0840         Minutes 30         Timed Code Treatment Minutes:  15  Total Treatment Minutes:  27.      Vita Lozada, PT

## 2020-11-16 NOTE — CARE COORDINATION
Case Management Assessment           Daily Note                 Date/ Time of Note: 11/16/2020 3:04 PM         Note completed by: Leslie Kang    Patient Name: Mario Coburn  YOB: 1940    Diagnosis:Suspected COVID-19 virus infection [Z20.828]  Suspected COVID-19 virus infection [Z20.828]  Patient Admission Status: Inpatient    Date of Admission:11/13/2020 12:50 PM Length of Stay: 3 GLOS:      Current Plan of Care: monitoring blood sugars (elevated into 500's this am), COVID +  ________________________________________________________________________________________  PT AM-PAC: 20 / 24 per last evaluation on: 11/16/20     OT AM-PAC: 22 / 24 per last evaluation on: 11/16/20     DME Needs for discharge:   ________________________________________________________________________________________  Discharge Plan: SNF: [facility] report: [phone] and fax: [phone]    Tentative discharge date: 11/17/20     Current barriers to discharge: elevated blood sugars, medical clearance for DC    Referrals completed: Not Applicable    Resources/ information provided: Not indicated at this time  ________________________________________________________________________________________  Case Management Notes: ZIA continues to follow for discharge planning and needs. Patient to go to SNF at [facility] to the COVID unit before able to return to [address] [place] [address] [place]. ZIA spoke with patients sister Sylvie Claude ([phone]), she is in agreement with transfer to COVID unit at [facility] and will obtain the patients clothes from [facility] [employer] [facility] and will get them to [address] [place] [address] for patient. ZIA spoke with Ninoska Hannah at [facility], she reports that they will be able to accept the patient 11/17/20 and is aware of current ambulance transport arranged for 1300 on 11/17/20. Family and nursing aware of DC plan for 11/17/20.      ZIA has completed HENS online through secure website for SNF admission at [facility]. LISA document ID #: [id number]    Case management has presented and reviewed [facility] letter #2 to the patient and/or family/ POA. Patient and/or family/POA verbalized understanding of their medicare rights and appeal process if needed. Patient and/or family/POA has signed, initialed and placed today's date (11/16/20 ) and time (1130) on IMM letter #2 on the the appropriate lines. Patient and/or family/POA, copy of letter offered and they are aware that this original copy of [facility] letter #2 is available prior to discharge from the paper chart on the unit. Electronic documentation has been entered into epic for IMM letter #2 and original paper copy has been added to the paper chart at the nurses station. Eleanor Araujo and her family were provided with choice of provider; she and her family are in agreement with the discharge plan.     Care Transition Patient: Yes    Leslie Kang RN  [facility] [employer]  Case Management Department  Ph: [phone]  Fax: [phone]

## 2020-11-16 NOTE — PROGRESS NOTES
SAFE-T-PRO LANCETS MISC TEST FOUR TIMES DAILY 400 each 1    Insulin Pen Needle (NOVOFINE AUTOCOVER) 30G X 8 MM MISC USE FOUR TIMES DAILY 400 each 1         Scheduled Meds:   insulin glargine  40 Units Subcutaneous Nightly    insulin lispro  0-18 Units Subcutaneous TID WC    insulin lispro  0-9 Units Subcutaneous Nightly    insulin lispro  0.08 Units/kg Subcutaneous TID WC    [START ON 11/17/2020] lisinopril  40 mg Oral Daily    citalopram  10 mg Oral Daily    levothyroxine  100 mcg Oral QAM AC    mirtazapine  15 mg Oral Nightly    therapeutic multivitamin-minerals  1 tablet Oral Daily    pantoprazole  40 mg Oral QAM AC    pravastatin  80 mg Oral Daily    traZODone  50 mg Oral Nightly    dicyclomine  20 mg Oral Daily    sodium chloride flush  10 mL Intravenous 2 times per day    dexamethasone  6 mg Oral Daily    heparin (porcine)  5,000 Units Subcutaneous 3 times per day    amLODIPine  10 mg Oral Daily      Continuous Infusions:   dextrose       PRN Meds:glucose, dextrose, glucagon (rDNA), dextrose, sodium chloride flush, acetaminophen **OR** acetaminophen, polyethylene glycol, promethazine **OR** ondansetron, labetalol, hydrALAZINE    Allergies: Allergies   Allergen Reactions    Lipitor [Atorvastatin]     Nickel Rash     Jewelry & foods some 20+ yrs ago, but eats everything now without return of rash    Oxycodone-Acetaminophen Nausea And Vomiting     Patient states \"oxycodone is okay\" for her to take       PHYSICAL EXAM:       Vitals: BP (!) 150/66   Pulse 61   Temp 96.6 °F (35.9 °C) (Oral)   Resp 20   Ht 5' 6\" (1.676 m)   Wt 194 lb 9.6 oz (88.3 kg)   SpO2 92%   BMI 31.41 kg/m²     I/O:      Intake/Output Summary (Last 24 hours) at 11/16/2020 1659  Last data filed at 11/16/2020 1105  Gross per 24 hour   Intake 780 ml   Output --   Net 780 ml     No intake/output data recorded. I/O last 3 completed shifts:   In: 80 [P.O.:780]  Out: -     Physical Examination:   · General appearance: alert.  · Skin: Skin is dry. · HEENT: Head: no lesions. · Neck: no tenderness/mass/nodules. · Lungs: clear to auscultation bilaterally. · Heart: no murmur, click, rub or gallop. · Abdomen: soft, non-tender; bowel sounds normal.  · Extremities: extremities normal.  · Neurologic: CN II-XII grossly intact. Mental status: Alert, oriented, thought content appropriate. DATA:       Labs:  CBC:   Recent Labs     11/14/20  0810 11/15/20  0430 11/16/20  0620   WBC 4.1 8.5 4.9   HGB 13.0 11.7* 12.1   HCT 38.9 34.9* 35.6*    278 313       BMP:   Recent Labs     11/14/20  0648 11/15/20  0430 11/16/20  0620   * 134* 134*   K 4.9 4.7 4.6    102 100   CO2 17* 22 21   BUN 16 22* 25*   CREATININE 1.5* 1.3* 1.3*   GLUCOSE 452* 143* 438*     LFT's: No results for input(s): AST, ALT, ALB, BILITOT, ALKPHOS in the last 72 hours. Troponin: No results for input(s): TROPONINI in the last 72 hours. BNP: No results for input(s): BNP in the last 72 hours. ABGs: No results for input(s): PHART, DMO4HSA, PO2ART in the last 72 hours. INR: No results for input(s): INR in the last 72 hours. Lipids: No results for input(s): CHOL, HDL in the last 72 hours. Invalid input(s): LDLCALCU    U/A:No results for input(s): NITRITE, COLORU, PHUR, LABCAST, WBCUA, RBCUA, MUCUS, TRICHOMONAS, YEAST, BACTERIA, CLARITYU, SPECGRAV, LEUKOCYTESUR, UROBILINOGEN, BILIRUBINUR, BLOODU, GLUCOSEU, AMORPHOUS in the last 72 hours. Invalid input(s): Vicente Brine    Rad:  CT CHEST ABDOMEN PELVIS W CONTRAST   Final Result      CHEST:      1. Diffuse bilateral groundglass pulmonary infiltrates with interlobular septal thickening. Findings may be seen in the setting of atypical pneumonia such as Covid-19 pneumonia. Correlation is advised. ABDOMEN/PELVIS:      1. Areas of bladder wall thickening suggested. Correlation with urinalysis.  Consideration of urologic evaluation/cystoscopy may be beneficial.     2. Fluid density noted within the colon, concurrent with history of diarrhea. XR CHEST PORTABLE   Final Result   1. Multifocal airspace disease, right upper lobe and left lower lobe. ASSESSMENT AND PLAN:   Mayela Paris a [de-identified] y.o. female, who was presented with acute hypoxic respiratory failure.     Acute hypoxic respiratory failure possibly 2/2 COVID-19  Possible COVID-19 pneumonia (RESOLVED)   Patient desaturated to high 80s when she walks. CT chest w/ contrast (11/13) showed diffuse b/l groundglass pulmonary infiltrates with interlobular septal thickening.  - O2 as needed  - F/u COVID-19 results  - Decadron 6 mg for 5 days (4th day- 11/13)  - Droplet plus isolation     Diarrhea likely 2/2 COVID-19 pneumonia  Denied blood and mentioned that she went to the bathroom for 4-5 times a day. Crampy right lower abdominal pain. C diff negative. At home on Dicyclomine. Presented to ED on 01/16/2020 with similar abdominal pain and diarrhea. Non anion gap metabolic acidosis. GI bacterial pathogens by PCR- negative. - as above   - F/u I's/O's     Non anion metabolic acidosis 2/2 diarrhea (RESOLVED)   Bicar 17, anion gap 14. S/p bicarb at 50 ml/hr for 20 hr.     LLQ abdominal pain probably 2/2 diarrhea  Pain started with diarrhea. CT abdomen w/ contrast (11/13) showed area of bladder wall thickening- non concerning.  - negative for C diff.     Hypertensive urgency (RESOLVED)   HTN  SBP in . - F/u VS  - Continue home Lisinopril 20 mg daily (nephroprotective)  - Amlodipine 10 mg   - PRN Labetalol 10 mg Q6hr PRN and Hydralazine 5 mg Q6hr PRN     CKD stage 3b due to diabetes and HTN  GFR in Jan 36, crea 1.4. On admission GFR 33, crea 1.4. Proteinuria 100. - Continue home Lisinopril 20 mg daily (nephroprotective)     Lactic acidosis 2/2 dehydration- resolved  LA on admission 2.5, Bicarb 17, anion gap 18. - Trend LA Q6hr x2  - F/u I's/O's     Hyperglycemia in the settings of uncontrolled T2DM (insulin dependent)  On admission glucose 313. At home on Lantus 20 mg BID, Lispro, insulin. HbA1C (08/27/2020): 7.3%. No ketones in urine.  - Decadron was started- monitor glucose  - Daily BMP  - Hypoglycemia protocol  - POCT glucose  - Diabetic diet  - HSSI  - Increased Lantus from 30 U nightly to 40 U nightly     HLD  LDL 98, cholesterol 179, triglyceride 195.   - Continue home Pravastatin 80 mg daily     Hypothyroidism   TSH (05/28/2020): 17.96.  - Continue home Levothyroxine 100 MCG daily     GERD  At home on Omeprazole 40 mg.  - Protonix 40 mg daily     MDD  - Continue home Citalopram 10 mg daily, Mirtazapine 15 mg daily and Trazodone 50 mg daily     PT/OT eval   PT 20/24, OT 22/24.       Code Status:Full Code  FEN: DIET CARB CONTROL;  PPX: SQH  DISPO: [facility]  This patient has been staffed and discussed with Mariama Lynn MD.   -----------------------------  Simon Aguero MD, PGY-1  Internal Medicine

## 2020-11-16 NOTE — PLAN OF CARE
Problem: Falls - Risk of:  Goal: Will remain free from falls  Description: Will remain free from falls  11/16/2020 1132 by cB Wood RN  Outcome: Met This Shift  Note: Side rails up x 2 call light in reach bed in low position bed/chair alarm on when in use     Problem: Falls - Risk of:  Goal: Absence of physical injury  Description: Absence of physical injury  Outcome: Met This Shift     Problem: Skin Integrity:  Goal: Will show no infection signs and symptoms  Description: Will show no infection signs and symptoms  Outcome: Met This Shift  Note: Cont. To monitor; pt very mobile gait steady  SBA with walker for support if needed     Problem: Skin Integrity:  Goal: Absence of new skin breakdown  Description: Absence of new skin breakdown  11/16/2020 1132 by Bc Wood RN  Outcome: Met This Shift     Problem: Airway Clearance - Ineffective  Goal: Achieve or maintain patent airway  Outcome: Met This Shift  Note: Lungs diminished T.O.  isolation for droplet +/airborn in place     Problem: Gas Exchange - Impaired  Goal: Absence of hypoxia  11/16/2020 1132 by Bc Wood RN  Outcome: Met This Shift  Note: WNL's at rest on room air     Problem: Gas Exchange - Impaired  Goal: Promote optimal lung function  Outcome: Met This Shift     Problem: Breathing Pattern - Ineffective  Goal: Ability to achieve and maintain a regular respiratory rate  Outcome: Met This Shift     Problem: Body Temperature -  Risk of, Imbalanced  Goal: Ability to maintain a body temperature within defined limits  11/16/2020 1132 by Bc Wood RN  Outcome: Met This Shift  Note: No fever     Problem: Body Temperature -  Risk of, Imbalanced  Goal: Will regain or maintain usual level of consciousness  Outcome: Met This Shift     Problem:  Body Temperature -  Risk of, Imbalanced  Goal: Complications related to the disease process, condition or treatment will be avoided or minimized  Outcome: Met This Shift     Problem: Isolation Precautions - Risk of Spread of Infection  Goal: Prevent transmission of infection  11/16/2020 1132 by Edita Nielson RN  Outcome: Met This Shift  Note: Droplet+/air born  in place     Problem: Risk for Fluid Volume Deficit  Goal: Maintain normal heart rhythm  Outcome: Met This Shift     Problem: Risk for Fluid Volume Deficit  Goal: Maintain absence of muscle cramping  Outcome: Met This Shift     Problem: Risk for Fluid Volume Deficit  Goal: Maintain normal serum potassium, sodium, calcium, phosphorus, and pH  Outcome: Met This Shift     Problem: Loneliness or Risk for Loneliness  Goal: Demonstrate positive use of time alone when socialization is not possible  Outcome: Met This Shift     Problem: Fatigue  Goal: Verbalize increase energy and improved vitality  Outcome: Met This Shift     Problem: Patient Education: Go to Patient Education Activity  Goal: Patient/Family Education  Outcome: Met This Shift     Problem: Musculor/Skeletal Functional Status  Goal: Highest potential functional level  Outcome: Met This Shift     Problem: Musculor/Skeletal Functional Status  Goal: Absence of falls  Outcome: Met This Shift     Problem: Nutrition Deficits  Goal: Optimize nutrtional status  Outcome: Completed

## 2020-11-16 NOTE — ACP (ADVANCE CARE PLANNING)
requirements, which must be documented in the EHR.     Conversation Outcomes / Follow-Up Plan:   Entered DNR order (If yes, complete Durable DNR form)      Length of Voluntary ACP Conversation in minutes:  <16 minutes (Non-Billable)    Luma Gabriel

## 2020-11-16 NOTE — PLAN OF CARE
Problem: Falls - Risk of:  Goal: Will remain free from falls  Description: Will remain free from falls  Outcome: Ongoing   Fall precautions in place. Bed alarm activated, low position, wheels locked. Up with stand-by assist.  Call light and belongings within reach. Patient encouraged to call with needs and concerns. Problem: Skin Integrity:  Goal: Absence of new skin breakdown  Description: Absence of new skin breakdown  Outcome: Ongoing   Skin intact. Patient repositions self. Problem: Gas Exchange - Impaired  Goal: Absence of hypoxia  Outcome: Ongoing   Lungs diminished t/o. Non-productive cough. SpO2 92-95% RA. Problem: Body Temperature -  Risk of, Imbalanced  Goal: Ability to maintain a body temperature within defined limits  Outcome: Ongoing   Afebrile. Problem: Isolation Precautions - Risk of Spread of Infection  Goal: Prevent transmission of infection  Outcome: Ongoing   Utilizing PPE throughout shift.

## 2020-11-16 NOTE — PROGRESS NOTES
Occupational Therapy   Occupational Therapy Initial Assessment/ Treatment  Date: 2020   Patient Name: Eamon Rivera  MRN: [id number]     : 1940    Date of Service: 2020    Discharge Recommendations: Eamon Rivera scored a 22/24 on the AM-PAC ADL Inpatient form. Current research shows that an AM-PAC score of 18 or greater is typically associated with a discharge to the patient's home setting. Based on the patient's AM-PAC score, and their current ADL deficits, it is recommended that the patient have 2-3 sessions per week of Occupational Therapy at d/c to increase the patient's independence. At this time, this patient demonstrates the endurance and safety to discharge home with home service and a follow up treatment frequency of 2-3x/wk. Please see assessment section for further patient specific details. If patient discharges prior to next session this note will serve as a discharge summary. Please see below for the latest assessment towards goals. Assessment   Performance deficits / Impairments: Decreased ADL status; Decreased strength;Decreased endurance;Decreased high-level IADLs;Decreased functional mobility   Assessment: Prior to admission, patient was independent with all ADL task and light homemaking tasks. Patient now presenting below baseline in ADL status and functional mobility requring SBA for sit-stand and CGA for functional ambulation and set-up for LB dressing and grooming tasks. Patient would benefit from acute OT services to maximize independence and return to PLOF. AMPA score of 22 indicates homebound discharge and pt would benefit with ongoing home therapy services.   Treatment Diagnosis: Decreased ADLS and functional transfers  Prognosis: Good  Decision Making: Medium Complexity  OT Education: OT Role;Plan of Care  Activity Tolerance  Activity Tolerance: Patient Tolerated treatment well  Activity Tolerance: Pt reporting feeling more fatigued then what she normally does in home set-up. Patient able to tolerate all functional mobility and transfers this date with O2 stats in high 90's at end of session  Safety Devices  Safety Devices in place: Yes  Type of devices: Call light within reach; Chair alarm in place           Patient Diagnosis(es): The primary encounter diagnosis was Diarrhea, unspecified type. Diagnoses of Hypoxia and COVID-19 were also pertinent to this visit. has a past medical history of Almonte's esophagus without dysplasia, Depression, DM2 (diabetes mellitus, type 2) ([facility] [address] [address] History of blood transfusion, Hyperlipidemia, Hypertension, Hypothyroid, and Shingles. has a past surgical history that includes knee surgery (2011); Cataract removal (2011); Tonsillectomy;  section; bladder suspension; lumbar laminectomy (3/2/15); and Cholecystectomy, laparoscopic (10/17/2017). Treatment Diagnosis: Decreased ADLS and functional transfers      Restrictions  Position Activity Restriction  Other position/activity restrictions: Up with assist    Subjective   General  Chart Reviewed: Yes  Patient assessed for rehabilitation services?: Yes  Additional Pertinent Hx: Pt came to emergency department with complaints of abdominal pain diarrhea. Patient admitted and tested + for COVID-19. PMHx of DM2, hyperlipidemia, hypertension, and depression  Family / Caregiver Present: No  Referring Practitioner: Yasmany Krishnamurthy DO  Diagnosis: COVID-19  Subjective  Subjective: Pt  supine in bed upon OT/PT arrival. Patient educated on POC and willing to participate in evaluation this date.   Patient Currently in Pain: Denies  Pain Assessment  Pain Assessment: 0-10  Pain Level: 0  Vital Signs  Temp: 96.2 °F (35.7 °C)  Temp Source: Oral  Heart Rate Source: Monitor;Telemetry  Resp: 20  BP Location: Left upper arm  Patient Position: Turns self;Semi fowlers  Level of Consciousness: Alert  Patient Currently in Pain: Denies  Oxygen Therapy  O2 Device: None (Room air)  Social/Functional History  Social/Functional History  Lives With: Alone  Type of Home: Assisted living  Home Layout: One level  Home Access: Level entry  Bathroom Shower/Tub: Walk-in shower, Shower chair with back  [place] Equipment: Hand-held shower, Shower chair  Bathroom Accessibility: Accessible  Home Equipment: Grab bars, Alert Button, 4 wheeled walker  Receives Help From: Family(Sister assists with transportation for appointments)  ADL Assistance: Independent(Reports doing independently)  [address]: Independent  Homemaking Responsibilities: Yes  Laundry Responsibility: Primary  Cleaning Responsibility: Primary  Ambulation Assistance: Needs assistance(Needs assist from 4WW when ambulating longer than housheold distances)  Transfer Assistance: Independent  Active : No  Occupation: Retired  Leisure & Hobbies: Playing card games  Additional Comments: Patient reports using 2WW to ambulate to dining room for meals in [facility] but walks indepedently for short household distances.        Objective   Vision: Impaired  Vision Exceptions: Wears glasses at all times  Hearing: Exceptions to [facility]  Hearing Exceptions: Bilateral hearing aid    Orientation  Overall Orientation Status: Within Functional Limits     Balance  Sitting Balance: Modified independent   Standing Balance: Stand by assistance  Standing Balance  Time: ~10 min  Activity: Ambulating to and from commode and bathroom sink to complete grooming tasks  Functional Mobility  Functional - Mobility Device: Rolling Walker(2WW)  Activity: To/from bathroom  Assist Level: Contact guard assistance  Functional Mobility Comments: Patient with initial understeadiness on start to ambulate with 2WW but patient able to restore balance with CGA provided during ambulation for safety  Toilet Transfers  Toilet - Technique: Ambulating  Equipment Used: Standard toilet  Toilet Transfer: Stand by assistance  Toilet Transfers Comments: Pt did not required use of grab bars to ascend or descend from commode this date. SBA provided for safety when patient completed sit-stand, stand-sit transfers on and off commode  ADL  Grooming: Setup(Stood at sink with set-up at sink to complete oral hygeine)  LE Dressing: Setup(From seated position, pt donned depends with set-up)  Toileting: Stand by assistance  Coordination  Movements Are Fluid And Coordinated: Yes  Quality of Movement Other  Comment: Mvmt WFL this date     Bed mobility  Rolling to Left: Modified independent  Rolling to Right: Modified independent  Transfers  Sit to stand: Stand by assistance  Stand to sit: Stand by assistance     Cognition  Overall Cognitive Status: WFL        Sensation  Overall Sensation Status: WFL        LUE PROM (degrees)  LUE PROM: WFL  LUE AROM (degrees)  LUE AROM : WFL  LUE Strength  Gross LUE Strength: WFL  RUE Strength  Gross RUE Strength: WFL                   Plan   Plan  Times per week: 1-2x  Times per day: Daily  Current Treatment Recommendations: Strengthening, Balance Training, Functional Mobility Training, Endurance Training, Self-Care / ADL  Plan Comment: Pt plans to go to [facility]. If home patient will required home therapy services and increased PRN assistance d/t deconditioned state. Treatment included functional transfer training, ADLs, and patient education.      AM-PAC Score        AM-[facility] Inpatient Daily Activity Raw Score: 22 (11/16/20 0858)  AM-PAC Inpatient ADL T-Scale Score : 47.1 (11/16/20 0858)  ADL Inpatient CMS 0-100% Score: 25.8 (11/16/20 0858)  ADL Inpatient CMS G-Code Modifier : CJ (11/16/20 5784)    Goals  Short term goals  Time Frame for Short term goals: By discharge  Short term goal 1: Patient will perform sit-stand transfer with modified independence  Short term goal 2: Patient will perform toilet transfer with modified independence  Short term goal 3: Patient will complete B UE HEP 20x to improve UE endurance required for ADLs and functional transfers  Patient Goals   Patient goals :  To return to assisted living apartment complex       Therapy Time   Individual Concurrent Group Co-treatment   Time In 0810         Time Out 0840         Minutes 30         Timed Code Treatment Minutes: 15 Minutes(+15 for OT eval)       Jose Enrique Gatica OT

## 2020-11-17 VITALS
BODY MASS INDEX: 31.13 KG/M2 | WEIGHT: 193.7 LBS | HEIGHT: 66 IN | TEMPERATURE: 96.6 F | SYSTOLIC BLOOD PRESSURE: 153 MMHG | RESPIRATION RATE: 20 BRPM | DIASTOLIC BLOOD PRESSURE: 66 MMHG | HEART RATE: 62 BPM | OXYGEN SATURATION: 92 %

## 2020-11-17 LAB
ANION GAP SERPL CALCULATED.3IONS-SCNC: 15 MMOL/L (ref 3–16)
BASOPHILS ABSOLUTE: 0 K/UL (ref 0–0.2)
BASOPHILS RELATIVE PERCENT: 0.2 %
BUN BLDV-MCNC: 32 MG/DL (ref 7–20)
CALCIUM SERPL-MCNC: 8.7 MG/DL (ref 8.3–10.6)
CHLORIDE BLD-SCNC: 101 MMOL/L (ref 99–110)
CO2: 20 MMOL/L (ref 21–32)
CREAT SERPL-MCNC: 1.3 MG/DL (ref 0.6–1.2)
D DIMER: 283 NG/ML DDU (ref 0–229)
EOSINOPHILS ABSOLUTE: 0 K/UL (ref 0–0.6)
EOSINOPHILS RELATIVE PERCENT: 0.5 %
GFR AFRICAN AMERICAN: 48
GFR NON-AFRICAN AMERICAN: 39
GLUCOSE BLD-MCNC: 270 MG/DL (ref 70–99)
GLUCOSE BLD-MCNC: 282 MG/DL (ref 70–99)
GLUCOSE BLD-MCNC: 372 MG/DL (ref 70–99)
HCT VFR BLD CALC: 38.9 % (ref 36–48)
HEMOGLOBIN: 12.7 G/DL (ref 12–16)
LYMPHOCYTES ABSOLUTE: 1.6 K/UL (ref 1–5.1)
LYMPHOCYTES RELATIVE PERCENT: 20.5 %
MCH RBC QN AUTO: 31.4 PG (ref 26–34)
MCHC RBC AUTO-ENTMCNC: 32.7 G/DL (ref 31–36)
MCV RBC AUTO: 96 FL (ref 80–100)
MONOCYTES ABSOLUTE: 0.8 K/UL (ref 0–1.3)
MONOCYTES RELATIVE PERCENT: 9.8 %
NEUTROPHILS ABSOLUTE: 5.4 K/UL (ref 1.7–7.7)
NEUTROPHILS RELATIVE PERCENT: 69 %
PDW BLD-RTO: 14 % (ref 12.4–15.4)
PERFORMED ON: ABNORMAL
PERFORMED ON: ABNORMAL
PLATELET # BLD: 337 K/UL (ref 135–450)
PLATELET SLIDE REVIEW: ADEQUATE
PMV BLD AUTO: 8.7 FL (ref 5–10.5)
POTASSIUM REFLEX MAGNESIUM: 4.5 MMOL/L (ref 3.5–5.1)
RBC # BLD: 4.05 M/UL (ref 4–5.2)
SLIDE REVIEW: NORMAL
SODIUM BLD-SCNC: 136 MMOL/L (ref 136–145)
WBC # BLD: 7.8 K/UL (ref 4–11)

## 2020-11-17 PROCEDURE — 6360000002 HC RX W HCPCS: Performed by: STUDENT IN AN ORGANIZED HEALTH CARE EDUCATION/TRAINING PROGRAM

## 2020-11-17 PROCEDURE — 85025 COMPLETE CBC W/AUTO DIFF WBC: CPT

## 2020-11-17 PROCEDURE — 6370000000 HC RX 637 (ALT 250 FOR IP): Performed by: STUDENT IN AN ORGANIZED HEALTH CARE EDUCATION/TRAINING PROGRAM

## 2020-11-17 PROCEDURE — 80048 BASIC METABOLIC PNL TOTAL CA: CPT

## 2020-11-17 PROCEDURE — 83036 HEMOGLOBIN GLYCOSYLATED A1C: CPT

## 2020-11-17 PROCEDURE — 85379 FIBRIN DEGRADATION QUANT: CPT

## 2020-11-17 RX ORDER — INSULIN GLARGINE 100 [IU]/ML
40 INJECTION, SOLUTION SUBCUTANEOUS NIGHTLY
Qty: 5 PEN | Refills: 3 | Status: SHIPPED | OUTPATIENT
Start: 2020-11-17

## 2020-11-17 RX ADMIN — AMLODIPINE BESYLATE 10 MG: 10 TABLET ORAL at 08:42

## 2020-11-17 RX ADMIN — LISINOPRIL 40 MG: 40 TABLET ORAL at 08:42

## 2020-11-17 RX ADMIN — PANTOPRAZOLE SODIUM 40 MG: 40 TABLET, DELAYED RELEASE ORAL at 05:09

## 2020-11-17 RX ADMIN — PRAVASTATIN SODIUM 80 MG: 80 TABLET ORAL at 09:32

## 2020-11-17 RX ADMIN — LEVOTHYROXINE SODIUM 100 MCG: 0.1 TABLET ORAL at 05:09

## 2020-11-17 RX ADMIN — INSULIN LISPRO 9 UNITS: 100 INJECTION, SOLUTION INTRAVENOUS; SUBCUTANEOUS at 08:52

## 2020-11-17 RX ADMIN — INSULIN LISPRO 15 UNITS: 100 INJECTION, SOLUTION INTRAVENOUS; SUBCUTANEOUS at 12:24

## 2020-11-17 RX ADMIN — DEXAMETHASONE 6 MG: 4 TABLET ORAL at 08:46

## 2020-11-17 RX ADMIN — DICYCLOMINE HYDROCHLORIDE 20 MG: 20 TABLET ORAL at 08:42

## 2020-11-17 RX ADMIN — HEPARIN SODIUM 5000 UNITS: 5000 INJECTION INTRAVENOUS; SUBCUTANEOUS at 05:09

## 2020-11-17 RX ADMIN — INSULIN LISPRO 7 UNITS: 100 INJECTION, SOLUTION INTRAVENOUS; SUBCUTANEOUS at 12:25

## 2020-11-17 RX ADMIN — CITALOPRAM HYDROBROMIDE 10 MG: 10 TABLET ORAL at 08:42

## 2020-11-17 RX ADMIN — MULTIPLE VITAMINS W/ MINERALS TAB 1 TABLET: TAB at 08:42

## 2020-11-17 RX ADMIN — INSULIN LISPRO 7 UNITS: 100 INJECTION, SOLUTION INTRAVENOUS; SUBCUTANEOUS at 08:48

## 2020-11-17 ASSESSMENT — PAIN SCALES - GENERAL
PAINLEVEL_OUTOF10: 0

## 2020-11-17 NOTE — CARE COORDINATION
Case Management            Discharge Note                    Date / Time of Note: 11/17/2020 10:24 AM                  Discharge Note Completed by: Pearl Ates Day    Patient Name: Cristina Faith   YOB: 1940  Diagnosis: Suspected COVID-19 virus infection [Z20.828]  Suspected COVID-19 virus infection [Z20.828]   Date / Time: 11/13/2020 12:50 PM    Current PCP: Jimy Miller MD  Clinic patient: No    Hospitalization in the last 30 days: No    Advance Directives:  Code Status: DNR-CCA  [facility] DNR form completed and on chart: Yes    Financial:  Payor: MEDICARE / Plan: MEDICARE PART A AND B / Product Type: *No Product type* /      Pharmacy:    [address] [phone]  [address] [place] [address] FOR JOINT DISEASES [id number]  Phone: [phone] Fax: [phone]      Assistance purchasing medications?:    Assistance provided by Case Management: None at this time    Does patient want to participate in local refill/ meds to beds program?:      Meds To Beds General Rules:  1. Can ONLY be done Monday- Friday between 8:30am-5pm  2. Prescription(s) must be in pharmacy by 3pm to be filled same day  3. Copy of patient's insurance/ prescription drug card and patient face sheet must be sent along with the prescription(s)  4. Cost of Rx cannot be added to hospital bill. If financial assistance is needed, please contact unit  or ;  or  CANNOT provide pharmacy voucher for patients co-pays  5.  Patients can then  the prescription on their way out of the hospital at discharge, or pharmacy can deliver to the bedside if staff is available. (payment due at time of pick-up or delivery - cash, check, or card accepted)     Able to afford home medications/ co-pay costs: Yes    ADLS:  Current PT AM-PAC Score: 20 /24  Current OT AM-PAC Score: 22 /24      Discharge Disposition: [facility] (SNF):    [address] [place] [address], [address] [place] [address]   report: [phone]   fax: [phone]      LOC at discharge: Skilled  MERARI Completed: Yes    Notification completed in HENS/PAS?:  Yes : CM has completed HENS online through secure website for SNF admission at [address] [place] [address]. Document ID #:  [id number]    IMM Completed:   Yes, Case management has presented and reviewed IMM letter #2 to the patient and/or family/ POA. Patient and/or family/POA verbalized understanding of their medicare rights and appeal process if needed. Patient and/or family/POA has signed, initialed and placed today's date (11/16) and time (1130) on IMM letter #2 on the the appropriate lines. Patient and/or family/POA, copy of letter offered and they are aware that this original copy of IMM letter #2 is available prior to discharge from the paper chart on the unit. Electronic documentation has been entered into epic for IMM letter #2 and original paper copy has been added to the paper chart at the nurses station. Transportation:  Transportation Plan for discharge: EMS transportation   Mode of Transport: Ambulance stretcher - BLS    Reason for medical transport: Other: Shari  Name of [address] [place] [address], [address]: [address]  Phone: [phone]  Transport Time: 1:00 pm     Transportation form completed: Yes    Dialysis:  Dialysis patient: No      Referrals made at [facility] for outpatient continued care:  Not Applicable    Additional CM Notes:   SW rounded on this date. Patient is stable for discharge. Patient unable to return to [facility] due to Shari. [facility] Family Jayde ([phone]) is in agreement with plan. SW completed paperwork. BS was improved today.        The Plan for Transition of Care is related to the following treatment goals Suspected COVID-19 virus infection [Z20.828]  Suspected COVID-19 virus infection [Z20.828]      The Patient and/or patient representative Patient/family was provided with a choice of provider and agrees with the discharge plan Yes    Freedom of choice list was provided with basic dialogue that supports the patient's individualized plan of care/goals and shares the quality data associated with the providers.  Yes    Care Transitions patient: No    Shira Sharpe, JAZMINE  [facility]  Case Management Department  Ph: [phone]

## 2020-11-17 NOTE — PLAN OF CARE
Problem: Gas Exchange - Impaired  Goal: Absence of hypoxia  11/16/2020 2159 by Surendra Acevedo RN  Outcome: Ongoing  Note: Pt on RA with no complaints of SOB, lightheadedness or dizziness. O2 saturations in the 90's. Will continue to monitor.

## 2020-11-17 NOTE — DISCHARGE INSTR - COC
Continuity of Care Form    Patient Name: Noé Ac   :  1940  MRN:  [id number]    [facility] date:  2020  Discharge date:  2020  Code Status Order: DNR-CCA   Advance Directives:   [facility] Documentation       Date/Time Healthcare Directive Type of Healthcare Directive Copy in [address] Agent's Name Healthcare Agent's Phone Number    [phone]  No, patient does not have an advance directive for healthcare treatment -- -- -- -- --            Admitting Physician:  Collin Ricardo MD  PCP: Irene Baker MD    Discharging Nurse:Zenobia GUTIERREZ [facility] Unit/Room#: 0374/8470-86  Discharging Unit Phone Number:[phone]  Emergency Contact:   Extended Emergency Contact Information  Primary Emergency Contact: [address] of [address] [place] [address] Phone: [phone]  Relation: Brother/Sister  Secondary Emergency Contact: Lee Ann Camacho [address] Phone: [phone]  Relation: Child    Past Surgical History:  Past Surgical History:   Procedure Laterality Date    BLADDER SUSPENSION      CATARACT REMOVAL  2011    both     SECTION      CHOLECYSTECTOMY, LAPAROSCOPIC  10/17/2017    LAPAROSCOPIC CHOLECYSTECTOMY WITH CHOLANGIOGRAM    KNEE SURGERY  2011    left    LUMBAR LAMINECTOMY  3/2/15    microlumbar laminectomy L4-5 with c-arm    TONSILLECTOMY         Immunization History:   Immunization History   Administered Date(s) Administered    Influenza Virus Vaccine 10/02/2018    Influenza, Triv, inactivated, subunit, adjuvanted, IM (Fluad 65 yrs and older) 10/14/2019    Pneumococcal Conjugate 13-valent ([id number]) 2015    Pneumococcal Polysaccharide ([id number]) 2003, 2012    Td, unspecified formulation 2011    Zoster Live (Zostavax) 2007       Active Problems:  Patient Active Problem List   Diagnosis Code    Hypertension I10    Hyperlipidemia E78.5    Hypothyroidism due to acquired atrophy of thyroid E03.4    Depression F32.9    Low back pain M54.5    DDD (degenerative disc disease), lumbosacral M51.37    Stenosis, spinal, lumbar M48.061    Almonte's esophagus without dysplasia K22.70    Urinary frequency R35.0    Type 2 diabetes mellitus treated with insulin (HCC) E11.9, Z79.4    Major depressive disorder, recurrent, moderate (HCC) F33.1    Suspected COVID-19 virus infection Z20.828       Isolation/Infection:   Isolation            Droplet Plus          Patient Infection Status       Infection Onset Added Last Indicated Last Indicated By Review Planned Expiration Resolved Resolved By    COVID-19 11/13/20 11/15/20 11/13/20 COVID-19 11/22/20 11/27/20      Resolved    COVID-19 Rule Out 11/13/20 11/13/20 11/13/20 COVID-19 (Ordered)   11/15/20 Rule-Out Test Resulted    C-diff Rule Out 11/13/20 11/13/20 11/13/20 GI Bacterial Pathogens By PCR (Ordered)   11/13/20 Rule-Out Test Resulted            Nurse Assessment:  Last Vital Signs: BP (!) 161/70   Pulse 62   Temp 96.4 °F (35.8 °C) (Oral)   Resp 20   Ht 5' 6\" (1.676 m)   Wt 193 lb 11.2 oz (87.9 kg)   SpO2 95%   BMI 31.26 kg/m²     Last documented pain score (0-10 scale): Pain Level: 0  Last Weight:   Wt Readings from Last 1 Encounters:   11/17/20 193 lb 11.2 oz (87.9 kg)     Mental Status:  oriented    IV Access:  - None    Nursing Mobility/ADLs:  Walking   Assisted  Transfer  Independent  Bathing  Independent  Dressing  Independent  Toileting  Assisted  Feeding  Independent  Med Admin  Assisted  Med Delivery   whole    Wound Care Documentation and Therapy:        Elimination:  Continence:   · Bowel: No  · Bladder: No  Urinary Catheter: None   Colostomy/Ileostomy/Ileal Conduit: No       Date of Last BM: 11-    Intake/Output Summary (Last 24 hours) at 11/17/2020 0952  Last data filed at 11/17/2020 0731  Gross per 24 hour   Intake 360 ml   Output --   Net 360 ml     I/O last 3 completed shifts:   In: 600 [P.O.:600]  Out: -     Safety Concerns:     None    Impairments/Disabilities:      None    Nutrition Therapy:  Current Nutrition Therapy:   - Oral Diet:  Carb Control 4 carbs/meal (1800kcals/day)    Routes of Feeding: Oral  Liquids: Thin Liquids  Daily Fluid Restriction: no  Last Modified Barium Swallow with Video (Video Swallowing Test): not done    Treatments at the Time of Hospital Discharge:   Respiratory Treatments: ***  Oxygen Therapy:  is not on home oxygen therapy. Ventilator:    - No ventilator support    Rehab Therapies: {THERAPEUTIC INTERVENTION:[id number]}  Weight Bearing Status/Restrictions: No weight bearing restirctions  Other Medical Equipment (for information only, NOT a DME order):  walker  Other Treatments: ***    Patient's personal belongings (please select all that are sent with patient):  Glasses    RN SIGNATURE:  Electronically signed by Pilar Pelletier RN on 11/17/20 at 10:43 AM EST    CASE MANAGEMENT/SOCIAL WORK SECTION    Inpatient Status Date: 11/13/2020    Readmission Risk Assessment Score:  Readmission Risk              Risk of Unplanned Readmission:        14           Discharging to Facility/ Agency   [facility] [place], [place], [place]   Report: [phone]   Fax: [phone]     / signature: Electronically signed by JAZMINE Mead on 11/17/20 at 11:01 AM EST    PHYSICIAN SECTION    Prognosis: Fair    Condition at Discharge: Stable    Rehab Potential (if transferring to Rehab): Good    Recommended Labs or Other Treatments After Discharge:     PT OT  Renal panel In 3 days       Physician Certification: I certify the above information and transfer of Donaldo Gilbert  is necessary for the continuing treatment of the diagnosis listed and that she requires [facility] for greater 30 days.      Update Admission H&P: No change in H&P    PHYSICIAN SIGNATURE:  Electronically signed by Lynda Presley MD on 11/17/20 at 10:23 AM EST

## 2020-11-17 NOTE — PROGRESS NOTES
Progress Note  PGY-1    CC: Diarrhea (1 week), lower abdominal pain, SOB  Patient's PCP: Nick Diaz MD    Interval History     No acute events overnight. She denied SOB, chest pain, nausea and vomiting, fever, chills. States she wants to go home. BS better this AM but still elevated. Currently saturating well on room air    MEDICATIONS:     No current facility-administered medications on file prior to encounter. Current Outpatient Medications on File Prior to Encounter   Medication Sig Dispense Refill    insulin glargine (LANTUS SOLOSTAR) 100 UNIT/ML injection pen Inject 20 Units into the skin 2 times daily      insulin lispro, 1 Unit Dial, (HUMALOG KWIKPEN) 100 UNIT/ML SOPN Inject 25-30 Units into the skin 3 times daily (before meals) Per Home Sliding scale      polyethyl glycol-propyl glycol 0.4-0.3 % (SYSTANE) 0.4-0.3 % ophthalmic solution Place 1-2 drops into both eyes as needed for Dry Eyes      dicyclomine (BENTYL) 20 MG tablet TAKE 1 TABLET BY MOUTH DAILY 30 tablet 1    omeprazole (PRILOSEC) 40 MG delayed release capsule Take 1 capsule by mouth daily 90 capsule 0    citalopram (CELEXA) 10 MG tablet Take 1 tablet by mouth daily 30 tablet 3    STOOL SOFTENER 100 MG capsule TAKE 1 CAPSULE BY MOUTH TWICE DAILY 60 capsule 0    traZODone (DESYREL) 50 MG tablet TAKE 1 TABLET BY MOUTH DAILY 90 tablet 0    pravastatin (PRAVACHOL) 80 MG tablet TAKE 1 TABLET BY MOUTH DAILY 90 tablet 0    lisinopril (PRINIVIL;ZESTRIL) 20 MG tablet TAKE 1 TABLET BY MOUTH DAILY 90 tablet 0    mirtazapine (REMERON) 15 MG tablet TAKE 1/2 TABLET BY MOUTH EVERY NIGHT AT BEDTIME 45 tablet 0    levothyroxine (SYNTHROID) 100 MCG tablet Take 1 tablet by mouth Daily 90 tablet 0    Multiple Vitamins-Minerals (THERAPEUTIC MULTIVITAMIN-MINERALS) tablet Take 1 tablet by mouth daily.       Insulin Pen Needle 30G X 8 MM MISC 1 each by Does not apply route daily 100 each 3    SAFE-T-PRO LANCETS MISC TEST FOUR TIMES DAILY 400 clear to auscultation bilaterally. · Heart: no murmur, click, rub or gallop. · Abdomen: soft, non-tender; bowel sounds normal.  · Extremities: extremities normal.  · Neurologic: CN II-XII grossly intact. Mental status: Alert, oriented, thought content appropriate. DATA:       Labs:  CBC:   Recent Labs     11/15/20  0430 11/16/20  0620 11/17/20  0708   WBC 8.5 4.9 7.8   HGB 11.7* 12.1 12.7   HCT 34.9* 35.6* 38.9    313 337       BMP:   Recent Labs     11/15/20  0430 11/16/20  0620 11/17/20  0708   * 134* 136   K 4.7 4.6 4.5    100 101   CO2 22 21 20*   BUN 22* 25* 32*   CREATININE 1.3* 1.3* 1.3*   GLUCOSE 143* 438* 270*     LFT's: No results for input(s): AST, ALT, ALB, BILITOT, ALKPHOS in the last 72 hours. Troponin: No results for input(s): TROPONINI in the last 72 hours. BNP: No results for input(s): BNP in the last 72 hours. ABGs: No results for input(s): PHART, TJA3KGJ, PO2ART in the last 72 hours. INR: No results for input(s): INR in the last 72 hours. Lipids: No results for input(s): CHOL, HDL in the last 72 hours. Invalid input(s): LDLCALCU    U/A:No results for input(s): NITRITE, COLORU, PHUR, LABCAST, WBCUA, RBCUA, MUCUS, TRICHOMONAS, YEAST, BACTERIA, CLARITYU, SPECGRAV, LEUKOCYTESUR, UROBILINOGEN, BILIRUBINUR, BLOODU, GLUCOSEU, AMORPHOUS in the last 72 hours. Invalid input(s): Mir Arroyo    Rad:  CT CHEST ABDOMEN PELVIS W CONTRAST   Final Result      CHEST:      1. Diffuse bilateral groundglass pulmonary infiltrates with interlobular septal thickening. Findings may be seen in the setting of atypical pneumonia such as Covid-19 pneumonia. Correlation is advised. ABDOMEN/PELVIS:      1. Areas of bladder wall thickening suggested. Correlation with urinalysis. Consideration of urologic evaluation/cystoscopy may be beneficial.     2. Fluid density noted within the colon, concurrent with history of diarrhea. XR CHEST PORTABLE   Final Result   1.  Multifocal airspace disease, right upper lobe and left lower lobe. ASSESSMENT AND PLAN:   Cinthia Guerrero a [de-identified] y.o. female, who was presented with acute hypoxic respiratory failure.     Acute hypoxic respiratory failure possibly 2/2 COVID-19  Possible COVID-19 pneumonia (RESOLVED)   Patient desaturated to high 80s when she walks. CT chest w/ contrast (11/13) showed diffuse b/l groundglass pulmonary infiltrates with interlobular septal thickening.  - currently saturating well con RA.  - COVID-19 detected on 11/13/20  - Decadron 6 mg for 5 days. Day 5 of 5  - Droplet plus isolation     Diarrhea likely 2/2 COVID-19 pneumonia  Denied blood and mentioned that she went to the bathroom for 4-5 times a day. Crampy right lower abdominal pain. C diff negative. At home on Dicyclomine. Presented to ED on 01/16/2020 with similar abdominal pain and diarrhea. Non anion gap metabolic acidosis. GI bacterial pathogens by PCR- negative. - as above   - F/u I's/O's     Non anion metabolic acidosis 2/2 diarrhea (RESOLVED)   Bicar 17, anion gap 14. S/p bicarb at 50 ml/hr for 20 hr.     LLQ abdominal pain probably 2/2 diarrhea  Pain started with diarrhea. CT abdomen w/ contrast (11/13) showed area of bladder wall thickening- non concerning.  - negative for C diff.     Hypertensive urgency (RESOLVED)   HTN  SBP in . - F/u VS  - Continue home Lisinopril 20 mg daily (nephroprotective)  - Amlodipine 10 mg   - PRN Labetalol 10 mg Q6hr PRN and Hydralazine 5 mg Q6hr PRN     CKD stage 3b due to diabetes and HTN  GFR in Jan 36, crea 1.4. On admission GFR 33, crea 1.4. Proteinuria 100. - Continue home Lisinopril 20 mg daily (nephroprotective)     Lactic acidosis 2/2 dehydration- (resolved)  LA on admission 2.5, Bicarb 17, anion gap 18. - F/u I's/O's     Hyperglycemia in the settings of uncontrolled T2DM (insulin dependent)  On admission glucose 313. At home on Lantus 20 mg BID, Lispro, insulin. HbA1C (08/27/2020): 7.3%.  No ketones in

## 2020-11-17 NOTE — PLAN OF CARE
Problem: Falls - Risk of:  Goal: Will remain free from falls  Description: Will remain free from falls  Outcome: Met This Shift  Note: Side rails up x 2 call light in reach bed in low position bed/chair alarm on weh in use     Problem: Falls - Risk of:  Goal: Absence of physical injury  Description: Absence of physical injury  Outcome: Met This Shift     Problem: Skin Integrity:  Goal: Will show no infection signs and symptoms  Description: Will show no infection signs and symptoms  Outcome: Met This Shift  Note: Cont. To monitor     Problem: Skin Integrity:  Goal: Absence of new skin breakdown  Description: Absence of new skin breakdown  Outcome: Met This Shift     Problem: Airway Clearance - Ineffective  Goal: Achieve or maintain patent airway  Outcome: Met This Shift  Note: Air born and droplet+isolation in place     Problem: Gas Exchange - Impaired  Goal: Absence of hypoxia  11/17/2020 0948 by Jer Byers RN  Outcome: Met This Shift  Note: Room air sats WNL's denies sob, lungs decreased T.O. Problem: Gas Exchange - Impaired  Goal: Promote optimal lung function  Outcome: Met This Shift     Problem: Breathing Pattern - Ineffective  Goal: Ability to achieve and maintain a regular respiratory rate  Outcome: Met This Shift     Problem: Body Temperature -  Risk of, Imbalanced  Goal: Ability to maintain a body temperature within defined limits  Outcome: Met This Shift  Note: No fever     Problem: Body Temperature -  Risk of, Imbalanced  Goal: Will regain or maintain usual level of consciousness  Outcome: Met This Shift     Problem:  Body Temperature -  Risk of, Imbalanced  Goal: Complications related to the disease process, condition or treatment will be avoided or minimized  Outcome: Met This Shift     Problem: Isolation Precautions - Risk of Spread of Infection  Goal: Prevent transmission of infection  Outcome: Met This Shift     Problem: Risk for Fluid Volume Deficit  Goal: Maintain normal heart rhythm  Outcome: Met This Shift     Problem: Risk for Fluid Volume Deficit  Goal: Maintain absence of muscle cramping  Outcome: Met This Shift     Problem: Risk for Fluid Volume Deficit  Goal: Maintain normal serum potassium, sodium, calcium, phosphorus, and pH  Outcome: Met This Shift     Problem: Loneliness or Risk for Loneliness  Goal: Demonstrate positive use of time alone when socialization is not possible  Outcome: Met This Shift     Problem: Fatigue  Goal: Verbalize increase energy and improved vitality  Outcome: Met This Shift     Problem: Patient Education: Go to Patient Education Activity  Goal: Patient/Family Education  Outcome: Met This Shift     Problem: Musculor/Skeletal Functional Status  Goal: Highest potential functional level  Outcome: Met This Shift     Problem: Musculor/Skeletal Functional Status  Goal: Absence of falls  Outcome: Met This Shift

## 2020-11-18 LAB
ESTIMATED AVERAGE GLUCOSE: 211.6 MG/DL
HBA1C MFR BLD: 9 %

## 2020-11-18 NOTE — PROGRESS NOTES
Physician Progress Note      PATIENT:               Melissa Trejo  CSN #:                  [id number]  :                       1940  ADMIT DATE:       2020 12:50 PM  [address] DATE:        2020 1:50 PM  RESPONDING  PROVIDER #:        Yolanda Romero MD          QUERY TEXT:    Dear Attending Provider,    Pt admitted with acute respiratory failure 2/2 COVID-19 pneumonia. Pt noted to   have SIRS on admit: WBC: 3.9, T. 101. 3. If possible, please document in the progress notes and discharge summary if   you are evaluating and /or treating any of the following: The medical record reflects the following:  Risk Factors: COVID-19 Pneumonia  Clinical Indicators: SIRS on admit: WBC: 3.9, T. 101.3 with acute hypoxic   respiratory failure and metabolic acidosis; Per pn 11/15 \"looks much improved   since admission\"; metabolic acidosis on admit linked to diarrhea & dehydration  Treatment: 1L LR bolus, Bicarb gtt x 18 hrs, Decadron  Options provided:  -- Sepsis, present on admission due to COVID-19 Pneumonia, resolved  -- No Sepsis, COVID-19 Pneumonia only  -- Other - I will add my own diagnosis  -- Disagree - Not applicable / Not valid  -- Disagree - Clinically unable to determine / Unknown  -- Refer to Clinical Documentation Reviewer    PROVIDER RESPONSE TEXT:    This patient has COVID-19 Pneumonia only, patient is not septic. Query created by:  Gin Bassett on 2020 4:23 PM      Electronically signed by:  Yolanda Romero MD 2020 10:17 AM

## 2020-12-11 ENCOUNTER — TELEPHONE (OUTPATIENT)
Dept: INTERNAL MEDICINE CLINIC | Age: 80
End: 2020-12-11

## 2020-12-11 NOTE — TELEPHONE ENCOUNTER
Ana Maria Saldana RN with [employer] [facility], calling because pt just returned to facility today from [place] - she needs to discus pt's BP/blood sugar and meds.

## 2020-12-14 ENCOUNTER — CARE COORDINATION (OUTPATIENT)
Dept: CASE MANAGEMENT | Age: 80
End: 2020-12-14

## 2020-12-14 PROCEDURE — 1111F DSCHRG MED/CURRENT MED MERGE: CPT | Performed by: INTERNAL MEDICINE

## 2020-12-22 ENCOUNTER — TELEPHONE (OUTPATIENT)
Dept: INTERNAL MEDICINE CLINIC | Age: 80
End: 2020-12-22

## 2020-12-23 ENCOUNTER — VIRTUAL VISIT (OUTPATIENT)
Dept: INTERNAL MEDICINE CLINIC | Age: 80
End: 2020-12-23
Payer: MEDICARE

## 2020-12-23 PROCEDURE — G8427 DOCREV CUR MEDS BY ELIG CLIN: HCPCS | Performed by: NURSE PRACTITIONER

## 2020-12-23 PROCEDURE — G8400 PT W/DXA NO RESULTS DOC: HCPCS | Performed by: NURSE PRACTITIONER

## 2020-12-23 PROCEDURE — G8417 CALC BMI ABV UP PARAM F/U: HCPCS | Performed by: NURSE PRACTITIONER

## 2020-12-23 PROCEDURE — 1123F ACP DISCUSS/DSCN MKR DOCD: CPT | Performed by: NURSE PRACTITIONER

## 2020-12-23 PROCEDURE — 99214 OFFICE O/P EST MOD 30 MIN: CPT | Performed by: NURSE PRACTITIONER

## 2020-12-23 PROCEDURE — 1036F TOBACCO NON-USER: CPT | Performed by: NURSE PRACTITIONER

## 2020-12-23 PROCEDURE — 4040F PNEUMOC VAC/ADMIN/RCVD: CPT | Performed by: NURSE PRACTITIONER

## 2020-12-23 PROCEDURE — G8484 FLU IMMUNIZE NO ADMIN: HCPCS | Performed by: NURSE PRACTITIONER

## 2020-12-23 PROCEDURE — 1090F PRES/ABSN URINE INCON ASSESS: CPT | Performed by: NURSE PRACTITIONER

## 2020-12-23 PROCEDURE — 3052F HG A1C>EQUAL 8.0%<EQUAL 9.0%: CPT | Performed by: NURSE PRACTITIONER

## 2020-12-23 ASSESSMENT — ENCOUNTER SYMPTOMS
DIARRHEA: 0
COUGH: 0
NAUSEA: 0
RHINORRHEA: 0
BACK PAIN: 0
BLOOD IN STOOL: 0
CONSTIPATION: 0
SINUS PRESSURE: 0
ABDOMINAL PAIN: 0
EYE ITCHING: 0
SORE THROAT: 0
EYE REDNESS: 0
VOMITING: 0
WHEEZING: 0
SHORTNESS OF BREATH: 0
CHEST TIGHTNESS: 0
COLOR CHANGE: 0

## 2020-12-23 NOTE — ASSESSMENT & PLAN NOTE
Terrible control  Referral to endo at this time  She is far too brittle and needs endo guidance on insulin  She is insulin dependent at this time.

## 2020-12-23 NOTE — PROGRESS NOTES
Neurological: Negative for dizziness, seizures, syncope, weakness, light-headedness, numbness and headaches. Hematological: Negative for adenopathy. Does not bruise/bleed easily. Psychiatric/Behavioral: Negative for confusion, sleep disturbance and suicidal ideas. The patient is not nervous/anxious and is not hyperactive. Prior to Visit Medications    Medication Sig Taking?  Authorizing Provider   insulin glargine (LANTUS SOLOSTAR) 100 UNIT/ML injection pen Inject 40 Units into the skin nightly  Gael Salinas MD   insulin lispro, 1 Unit Dial, (HUMALOG KWIKPEN) 100 UNIT/ML SOPN Inject 25-30 Units into the skin 3 times daily (before meals) Per Home Sliding scale  Historical Provider, MD   polyethyl glycol-propyl glycol 0.4-0.3 % (SYSTANE) 0.4-0.3 % ophthalmic solution Place 1-2 drops into both eyes as needed for Dry Eyes  Historical Provider, MD   Insulin Pen Needle 30G X 8 MM MISC 1 each by Does not apply route daily  Matilda Edwards, APRN - CNP   omeprazole (PRILOSEC) 40 MG delayed release capsule Take 1 capsule by mouth daily  Jyoti Jaramillo MD   citalopram (CELEXA) 10 MG tablet Take 1 tablet by mouth daily  Jyoti Jaramillo MD   STOOL SOFTENER 100 MG capsule TAKE 1 CAPSULE BY MOUTH TWICE DAILY  Jyoti Jaramillo MD   traZODone (DESYREL) 50 MG tablet TAKE 1 TABLET BY MOUTH DAILY  Jyoti Jaramillo MD   pravastatin (PRAVACHOL) 80 MG tablet TAKE 1 TABLET BY MOUTH DAILY  Laura Daniels APRN - CNP   lisinopril (PRINIVIL;ZESTRIL) 20 MG tablet TAKE 1 TABLET BY MOUTH DAILY  Jyoti Jaramillo MD   mirtazapine (REMERON) 15 MG tablet TAKE 1/2 TABLET BY MOUTH EVERY NIGHT AT BEDTIME  Jyoti Jaramillo MD   levothyroxine (SYNTHROID) 100 MCG tablet Take 1 tablet by mouth Daily  Jyoti Jaramillo MD   SAFE-T-PRO LANCETS MISC TEST FOUR TIMES DAILY  Jyoti Jaramillo MD   Insulin Pen Needle (Ricarda Tang) 30G X 8 MM MISC USE FOUR TIMES DAILY  Jyoti Jaramillo MD Multiple Vitamins-Minerals (THERAPEUTIC MULTIVITAMIN-MINERALS) tablet Take 1 tablet by mouth daily. Historical Provider, MD       Past Medical History:   Diagnosis Date    Almonte's esophagus without dysplasia     Depression     DM2 (diabetes mellitus, type 2) ([facility] [address]     History of blood transfusion      with childbirth    Hyperlipidemia     Hypertension     Hypothyroid     Shingles       Past Surgical History:   Procedure Laterality Date    BLADDER SUSPENSION      CATARACT REMOVAL  2011    both     SECTION      CHOLECYSTECTOMY, LAPAROSCOPIC  10/17/2017    LAPAROSCOPIC CHOLECYSTECTOMY WITH CHOLANGIOGRAM    KNEE SURGERY  2011    left    LUMBAR LAMINECTOMY  3/2/15    microlumbar laminectomy L4-5 with c-arm    TONSILLECTOMY        Relationships   Social connections    Talks on phone: More than three times a week    Gets together: Three times a week    Attends [employer] service: 1 to 4 times per year    Active member of club or organization: Yes    Attends meetings of clubs or organizations: More than 4 times per year    Relationship status:      Family History   Problem Relation Age of Onset    Coronary Art Dis Mother     Diabetes Mother     Other Mother         uterine cancer    Coronary Art Dis Father     Other Sister         stomach cancer         No flowsheet data found. PHYSICAL EXAMINATION:  Physical Exam  Constitutional:       Appearance: Normal appearance. HENT:      Head: Normocephalic and atraumatic. Nose: Nose normal.   Eyes:      Extraocular Movements: Extraocular movements intact. Conjunctiva/sclera: Conjunctivae normal.      Pupils: Pupils are equal, round, and reactive to light. Neck:      Musculoskeletal: Normal range of motion. Pulmonary:      Effort: Pulmonary effort is normal.   Musculoskeletal: Normal range of motion. Skin:     Coloration: Skin is not jaundiced or pale. Findings: No bruising, erythema, lesion or rash. Neurological:      Mental Status: She is alert and oriented to person, place, and time. Mental status is at baseline. Psychiatric:         Mood and Affect: Mood normal.         Behavior: Behavior normal.         Thought Content: Thought content normal.         Judgment: Judgment normal.         ASSESSMENT/PLAN:  Problem List     Type 2 diabetes mellitus treated with insulin ([address] - Primary     Terrible control  Referral to endo at this time  She is far too brittle and needs endo guidance on insulin  She is insulin dependent at this time. Relevant Medications    insulin lispro, 1 Unit Dial, (HUMALOG KWIKPEN) 100 UNIT/ML SOPN    insulin glargine (LANTUS SOLOSTAR) 100 UNIT/ML injection pen    Other Relevant Orders    Paulo Dickerson MD, Endocrinology, [place]    Suspected COVID-19 virus infection     Resolved  Out of quarantine tomorrow   She is now asymptomatic                   No follow-ups on file. Esthela Wheeler is a [de-identified] y.o. female being evaluated by a Virtual Visit (video visit) encounter to address concerns as mentioned above. A caregiver was present when appropriate. Due to this being a TeleHealth encounter (During [facility]- public health emergency), evaluation of the following organ systems was limited: Vitals/Constitutional/EENT/Resp/CV/GI//MS/Neuro/Skin/Heme-Lymph-Imm. Pursuant to the emergency declaration under the [facility], [address] [place] [address] authority and the [employer] Act, this Virtual Visit was conducted with patient's (and/or legal guardian's) consent, to reduce the patient's risk of exposure to COVID-19 and provide necessary medical care. The patient (and/or legal guardian) has also been advised to contact this office for worsening conditions or problems, and seek emergency medical treatment and/or call 911 if deemed necessary. Patient identification was verified at the start of the visit: Yes    Total time spent on this encounter: Not billed by time    Services were provided through a video synchronous discussion virtually to substitute for in-person clinic visit. Patient and provider were located at their individual homes. --ANGEL Brothers CNP on 12/23/2020 at 2:42 PM    An electronic signature was used to authenticate this note.

## 2021-01-14 DIAGNOSIS — N18.30 STAGE 3 CHRONIC KIDNEY DISEASE, UNSPECIFIED WHETHER STAGE 3A OR 3B CKD (HCC): ICD-10-CM

## 2021-01-14 LAB
ALBUMIN SERPL-MCNC: 4.1 G/DL (ref 3.4–5)
ANION GAP SERPL CALCULATED.3IONS-SCNC: 15 MMOL/L (ref 3–16)
BUN BLDV-MCNC: 19 MG/DL (ref 7–20)
CALCIUM SERPL-MCNC: 9.6 MG/DL (ref 8.3–10.6)
CHLORIDE BLD-SCNC: 106 MMOL/L (ref 99–110)
CO2: 22 MMOL/L (ref 21–32)
CREAT SERPL-MCNC: 1.4 MG/DL (ref 0.6–1.2)
CREATININE URINE: 173.5 MG/DL (ref 28–259)
EPITHELIAL CELLS, UA: 1 /HPF (ref 0–5)
GFR AFRICAN AMERICAN: 44
GFR NON-AFRICAN AMERICAN: 36
GLUCOSE BLD-MCNC: 62 MG/DL (ref 70–99)
HYALINE CASTS: 3 /LPF (ref 0–8)
MICROALBUMIN UR-MCNC: 21 MG/DL
MICROALBUMIN/CREAT UR-RTO: 121 MG/G (ref 0–30)
PHOSPHORUS: 4.4 MG/DL (ref 2.5–4.9)
POTASSIUM SERPL-SCNC: 4.7 MMOL/L (ref 3.5–5.1)
RBC UA: 3 /HPF (ref 0–4)
SODIUM BLD-SCNC: 143 MMOL/L (ref 136–145)
URINE TYPE: ABNORMAL
WBC UA: 100 /HPF (ref 0–5)

## 2021-04-15 DIAGNOSIS — N18.30 STAGE 3 CHRONIC KIDNEY DISEASE, UNSPECIFIED WHETHER STAGE 3A OR 3B CKD (HCC): ICD-10-CM

## 2021-04-15 LAB
ALBUMIN SERPL-MCNC: 4.3 G/DL (ref 3.4–5)
ANION GAP SERPL CALCULATED.3IONS-SCNC: 11 MMOL/L (ref 3–16)
BUN BLDV-MCNC: 21 MG/DL (ref 7–20)
CALCIUM SERPL-MCNC: 9.2 MG/DL (ref 8.3–10.6)
CHLORIDE BLD-SCNC: 101 MMOL/L (ref 99–110)
CO2: 24 MMOL/L (ref 21–32)
CREAT SERPL-MCNC: 1.4 MG/DL (ref 0.6–1.2)
GFR AFRICAN AMERICAN: 44
GFR NON-AFRICAN AMERICAN: 36
GLUCOSE BLD-MCNC: 260 MG/DL (ref 70–99)
PHOSPHORUS: 3.6 MG/DL (ref 2.5–4.9)
POTASSIUM SERPL-SCNC: 5.1 MMOL/L (ref 3.5–5.1)
SODIUM BLD-SCNC: 136 MMOL/L (ref 136–145)

## 2021-04-16 LAB
CREATININE URINE: 110.4 MG/DL (ref 28–259)
MICROALBUMIN UR-MCNC: 13.2 MG/DL
MICROALBUMIN/CREAT UR-RTO: 119.6 MG/G (ref 0–30)

## 2021-07-29 NOTE — TELEPHONE ENCOUNTER
Your Child's Health  18-Month-Old Visit      Yvette Gerber  July 29, 2021    Visit Vitals  Ht 31.25\" (79.4 cm)   Wt 10.7 kg   HC 47.9 cm (18.86\")   BMI 16.98 kg/m²     Weight: 23.59 lbs      YOUR CHILD’S 18 MONTH OLD VISIT       Key points at this age…  • Yvette should continue to ride in a properly-installed car seat in the back seat. Correct use of a car seat is always critically important for your child’s safety. For maximum safety, keep the seat rear facing until your child reaches the top height or weight limit allowed by the car seat .        • Help your child’s language develop by talking to them with clear, simple words and reading lots of books together. Electronic media (TV, pads, phones, computers) are not recommended at this age.     • Take care of those teeth! Help your child brush twice daily with a tiny amount of regular (not baby) toothpaste. Avoid sugary and sticky foods and sweetened drinks like juice and soda. If you give your child juice, limit it to no more than 4 ounces a day of 100% juice.      Nutrition & Healthy Weight:   Healthy eating is a challenge for everyone, especially for busy parents of toddlers who can often be picky and hard to please! But it is important-- nearly 1 in 3 children in our country is overweight or obese which poses serious health risks for them as they get older.      Here are some things to think about at this age:   Water and milk are the healthiest drink choices for your children.      Avoid junk food and sweet things-- fried fast food, bagged snacks, candy, fruit snacks, sugary cereals, juice, Dashawn-Aid, etc. Once your child develops a “sweet tooth” for these things, they will always be asking for them. Too much sugar is bad for their teeth and their overall nutrition and weight. Children this age should have no more than 4 ounces of 100% fruit juice daily. Do not water it down in a bottle or sippy cup that they can carry around and  Jessica Pretty drink from over an extended period of time; this frequent exposure to the sugars in juice (even if diluted with water) just increases their risk of tooth decay.       Don’t push your children to eat when they do not want to. Offer them small portions at a time; they can have seconds when they eat everything they were offered. Serving large portions may result in eating more than they need (and waste!).      Toddlers often resist new foods, but keep trying! Most will eventually try something new after it is offered several times. (Don’t get into the habit of serving only what they like!)    Even when eating a very well balanced diet, children need some extra vitamin D. A liquid preparation of pure vitamin D (400 or 600 IU) or a multivitamin with iron drop is recommended.  (They are currently too young for a chewable vitamin because they could choke on those.)     DENTAL CARE:   By now you should be brushing your child’s teeth twice daily, especially at bedtime. (It’s okay if they think you are “just helping” them!) Brush with a tiny smear of regular (fluoridated) toothpaste (not baby toothpaste).  Using city water to drink and cook with provides important fluoride to strengthen and protect teeth against cavities. If you have well water instead of a city water supply, however, you should have it tested for fluoride content to determine whether your child might need fluoride supplements.     DEVELOPMENT & BEHAVIOR:  Toddlers will be learning lots of new skills at this age and over the next several months! They should be walking well and often running; most will try to climb stairs (if you let them!). They may help get themselves undressed, scribble, and use a cup and spoon fairly well. They should have at least 6 words, will gesture and point to show someone what they want. They will “pretend” their dolls or stuffed animals are real, and they will hopefully know the name of their favorite book. Over the next several  months, help them learn by pointing to and naming pictures in books as well as their own body parts. Talking, singing and reading together are great ways to advance your baby’s language skills!      The American Academy of Pediatrics (AAP) does not recommend any “screen time” (TV, videos, pads, phones) except for video chatting before age 18 months. If parents of children between 18 months and 2 years want to start some screen time, they should choose high-quality programming (the AAP suggests work created by [employer] or [employer]).  Watching should be limited to very brief time periods (minutes, not hours), and parents should watch these programs with their child to help children understand what they are seeing--children should not be given devices to watch on their own.     Most children are not developmentally ready for potty-training until closer to 3 years old. If your child seems interested, it is fine to get them a potty chair to use (and provide lots of praise when they use it!). Just remember their young body is not likely to be developed enough at this age for them to be fully potty trained at this time.       TEMPER TANTRUMS:  These begin about the time a child starts walking and can continue until they are 3½ years old (or older). Toddlers tantrum because they are frustrated that they can’t say what they want or need or they are angry because they cannot get or have something they want.  The most effective method of dealing with tantrums is to ignore them and wait for it to pass. Once your child is calm, simply direct them to a new activity. Don’t try to “discuss” their tantrum (they don’t get that!) and certainly do not promise them a reward for stopping their crying. Having (and enforcing) consistent rules and giving  your child a lot of positive attention when they are behaving well (“time in”) will promote good behavior and reduce tantrums. When your child is misbehaving (hitting, biting), brief  time-outs (which essentially means ignoring your child for a brief period) work because your child wants your attention more than anything else.     CAR SAFETY: An approved car seat in the back seat is required by [place] law. Your child is safest in a rear-facing convertible car seat right now: keep the seat in a rear-facing position until your child reaches the top height or weight limit allowed by the car seat . (Really! Even if your child is tall and they have to keep their legs bent, they are still safer when rear facing. And kids generally do not mind having their legs bunched up-- that really seems to bother parents more than it bothers kids!)     SAFETY & ACCIDENT PREVENTION:  By this age, your home should be pretty well safety-proofed. Here are some reminders about possible safety risks for curious, active toddlers!    1. Burns:  Keep dangerous items out of reach, including hot liquids, hot foods, and electrical cords on appliances such as irons, toasters, and coffee pots--children can pull on cords and suffer serious burns if they pull something hot down onto themselves. Have a family fire safety plan, including regular smoke detector (and carbon monoxide detector) battery checks, working fire extinguishers, and escape routes.  2. Falls: Keep cristobal on stairs and window latches on upper-story windows.  3. Water Safety:  Children can drown in just a couple inches of water, so never leave a toddler alone in a tub. Also, do not rely on older children to properly supervise the younger ones. Children this young are not developmentally ready for swimming lessons, and life jackets and “swimmies” will NOT protect your child from drowning! When near water, children need constant supervision by an adult who knows how to swim. Permanent pools (in ground and above ground) should be fenced off (and locked), and wading pools should be drained when not in use. Keep large buckets empty and keep toilet seat  lids down and secured with a safety lock if possible.   4.  Poisoning:  Continue to keep all cleaning and chemical products safely stored out of sight and out of reach. (Check for what is under your bathroom sink as well as your kitchen sink.) Keep purses (your own and ones belonging to visitors) out of reach since curious toddlers can quickly find medicines, cigarettes, and small objects to choke on! Keep the original bottles and safety caps for all medicines, including vitamins; do not transfer medicines to non-child-proofed or unlabeled containers. Be especially careful when around older people because they may keep their medicines in clear sight or in non-childproofed containers.   Is this number in your cell phone? Call the Poison Center at 1-893.713.5436 for any known or suspected poisoning.      SMOKING:  Continue to protect your child from cigarette smoke. Exposure to smoke will increase their risk of asthma, ear infections, and respiratory infections (pneumonia). If you smoke and are ready to consider quitting, talk to your doctor. Nicotine replacement products can be very helpful in breaking this tough addiction.     LEAD EXPOSURE:    If you live in [place], your child should be screened for lead now if they were not checked at age 12 months. The [facility] Department recommends screening children three times in the first 3 years of life. If you do not live in [place], talk to your doctor about lead screening if any of the following apply: (1) your child currently (or had previously) lives in or frequently visits a house or building built before 1950 (including , grandparent homes, etc); (2) your child currently (or had previously) lives in or frequently visits a house or building built before 1978 with recent or ongoing renovations; (3) your child has a brother, sister or playmate who has had lead poisoning; (4) your child is enrolled in Medicaid or WIC. Very rarely, other sources of  lead exposure can include herbal remedies or imported items (mini blinds, canned goods). Do an internet search for “[facility] Department Lead” for helpful information about lead risks in [place]. If you have questions about older neighborhoods in [place] that might have lead connecting (or service) pipes, do an internet search for \"[place] lead awareness and drinking water safety\". From this web page, you can search for your address to find out if your home was built with lead service lines. There are also helpful hints regarding water safety on this site.     SUN EXPOSURE:  Keep your child in shade and out of direct sun as much as you can. Protective clothing as well as hats and sunglasses help protect against sunburn and skin damage. When your child is going to be outside, always use a “broad spectrum” sunscreen (which means it protects against both UVA and UVB rays) with an SPF of 15 to 30; apply it to your child’s skin at least 20 to 30 minutes before they go out. Kids this age often like the zinc oxide (or titanium dioxide) products which are good for sensitive spots (nose, cheeks, ears, shoulders); these don’t “rub in” all the way, but kids often like the fun colors they come in!      MEDICATION FOR FEVER OR PAIN:   Acetaminophen liquid (e.g., Tylenol or Tempra) may be given every four hours as needed for pain or fever.  Acetaminophen liquid is less concentrated than the infant dropper bottle type.  Be sure to check which product CONCENTRATION you are using.    INFANT Tylenol/Acetaminophen  Drops (160 mg/5 mL)    Child’s Weight: Dose:  24 - 35 pounds:   160 mg (5.0 mL (1 Teaspoon))    CHILDREN’S Tylenol/Acetaminophen  (160 mg/5 mL)    Child’s Weight: Dose:  24 - 35 pounds:   160 mg (5.0 mL (1 Teaspoon))    CHILDREN'S Ibuprofen (100 mg/5 mL) liquid (for example, Advil or Motrin) may be given every 6 hours as needed for pain or fever.    Child’s Weight: Dose:  24 - 35 pounds:   100 mg (5.0  mL(1Teaspoon))    If Yvette is outside this weight range, call your pediatrician's office for advice      Most Recent Immunizations   Administered Date(s) Administered   • DTaP(INFANRIX) 04/22/2021   • DTaP/Hep B/IPV 2020   • Hep A, ped/adol, 2 dose 01/21/2021   • Hep B, adolescent or pediatric 2020   • Hib (PRP-OMP) 04/22/2021   • Influenza, injectable, quadrivalent, preservative-free 2020   • MMR 01/21/2021   • Pneumococcal Conjugate 13 valent 01/21/2021   • Rotavirus - pentavalent 2020   • Varicella 01/21/2021       If Yvette develops any of the following reactions within 72 hours after an immunization, notify your pediatrician by calling the pediatric phone nurse:  1. A temperature of 105 degrees or above.  2. More than 3 hours of continuous crying.  3. A shrill, high-pitched cry.  4. A pale, limp spell.  5. A seizure or fainting spell. In this case, you should call 911 or go immediately to the emergency room.    NEXT VISIT:  2 YEARS OF AGE    Thank you for entrusting your care to [facility].      Also, check out “Children’s Health” on the [facility] Blog for updates on timely topics regarding children’s health!

## 2021-11-11 ENCOUNTER — CARE COORDINATION (OUTPATIENT)
Dept: CARE COORDINATION | Age: 81
End: 2021-11-11

## 2021-11-11 NOTE — CARE COORDINATION
Patient Excluded from Care Coordination?  Yes     The patient will be excluded from Care Coordination for the following reason: Patient no longer sees PCP at [facility]; still following w/Dr Camie Goltz, but PCP now with [facility]

## 2022-02-24 NOTE — TELEPHONE ENCOUNTER
[facility] [place] group home asking for refills on   dicyclomine (BENTYL) 20 MG tablet   omeprazole (PRILOSEC) 40 MG delayed release capsule  lamoTRIgine (LAMICTAL) 25 MG tablet and  citalopram (CELEXA) 10 MG tablet         [id number] [place], [address] [phone] no

## 2022-04-26 DIAGNOSIS — N18.30 STAGE 3 CHRONIC KIDNEY DISEASE, UNSPECIFIED WHETHER STAGE 3A OR 3B CKD (HCC): ICD-10-CM

## 2022-04-26 LAB
ALBUMIN SERPL-MCNC: 4.5 G/DL (ref 3.4–5)
ANION GAP SERPL CALCULATED.3IONS-SCNC: 15 MMOL/L (ref 3–16)
BUN BLDV-MCNC: 23 MG/DL (ref 7–20)
CALCIUM SERPL-MCNC: 9.3 MG/DL (ref 8.3–10.6)
CHLORIDE BLD-SCNC: 102 MMOL/L (ref 99–110)
CO2: 22 MMOL/L (ref 21–32)
CREAT SERPL-MCNC: 1.3 MG/DL (ref 0.6–1.2)
CREATININE URINE: 111.5 MG/DL (ref 28–259)
GFR AFRICAN AMERICAN: 47
GFR NON-AFRICAN AMERICAN: 39
GLUCOSE BLD-MCNC: 185 MG/DL (ref 70–99)
MICROALBUMIN UR-MCNC: 62.1 MG/DL
MICROALBUMIN/CREAT UR-RTO: 557 MG/G (ref 0–30)
PHOSPHORUS: 4.4 MG/DL (ref 2.5–4.9)
POTASSIUM SERPL-SCNC: 4.8 MMOL/L (ref 3.5–5.1)
SODIUM BLD-SCNC: 139 MMOL/L (ref 136–145)

## 2022-08-25 DIAGNOSIS — N18.32 STAGE 3B CHRONIC KIDNEY DISEASE (HCC): ICD-10-CM

## 2022-08-25 LAB
ALBUMIN SERPL-MCNC: 4.3 G/DL (ref 3.4–5)
ANION GAP SERPL CALCULATED.3IONS-SCNC: 14 MMOL/L (ref 3–16)
BUN BLDV-MCNC: 28 MG/DL (ref 7–20)
CALCIUM SERPL-MCNC: 9.2 MG/DL (ref 8.3–10.6)
CHLORIDE BLD-SCNC: 98 MMOL/L (ref 99–110)
CO2: 21 MMOL/L (ref 21–32)
CREAT SERPL-MCNC: 1.4 MG/DL (ref 0.6–1.2)
CREATININE URINE: 118.5 MG/DL (ref 28–259)
GFR AFRICAN AMERICAN: 44
GFR NON-AFRICAN AMERICAN: 36
GLUCOSE BLD-MCNC: 336 MG/DL (ref 70–99)
MICROALBUMIN UR-MCNC: 71.2 MG/DL
MICROALBUMIN/CREAT UR-RTO: 600.8 MG/G (ref 0–30)
PHOSPHORUS: 3.7 MG/DL (ref 2.5–4.9)
POTASSIUM SERPL-SCNC: 4.6 MMOL/L (ref 3.5–5.1)
SODIUM BLD-SCNC: 133 MMOL/L (ref 136–145)

## 2022-12-19 DIAGNOSIS — Z79.4 TYPE 2 DIABETES MELLITUS TREATED WITH INSULIN (HCC): ICD-10-CM

## 2022-12-19 DIAGNOSIS — F33.1 MAJOR DEPRESSIVE DISORDER, RECURRENT, MODERATE (HCC): ICD-10-CM

## 2022-12-19 DIAGNOSIS — E11.9 TYPE 2 DIABETES MELLITUS TREATED WITH INSULIN (HCC): ICD-10-CM

## 2022-12-19 NOTE — PROGRESS NOTES
Abstract encounter for documentation of [facility]. Purposes only.   Patient is followed by Dr. Viridiana Nguyen in Care Everywhere  Major depressive disorder, recurrent, moderate ([facility] [address]   Unclear control, followed by Dr. Liz Odom per care everywhere    Type 2 diabetes mellitus treated with insulin ([facility] [address]    Unclear control, followed by Dr. Liz Odom per care everywhere

## 2023-01-05 DIAGNOSIS — N18.32 STAGE 3B CHRONIC KIDNEY DISEASE (HCC): ICD-10-CM

## 2023-01-05 LAB
ALBUMIN SERPL-MCNC: 4 G/DL (ref 3.4–5)
ANION GAP SERPL CALCULATED.3IONS-SCNC: 11 MMOL/L (ref 3–16)
BUN BLDV-MCNC: 18 MG/DL (ref 7–20)
CALCIUM SERPL-MCNC: 8.9 MG/DL (ref 8.3–10.6)
CHLORIDE BLD-SCNC: 107 MMOL/L (ref 99–110)
CO2: 23 MMOL/L (ref 21–32)
CREAT SERPL-MCNC: 1.2 MG/DL (ref 0.6–1.2)
CREATININE URINE: 103.5 MG/DL (ref 28–259)
GFR SERPL CREATININE-BSD FRML MDRD: 45 ML/MIN/{1.73_M2}
GLUCOSE BLD-MCNC: 295 MG/DL (ref 70–99)
MICROALBUMIN UR-MCNC: 59.1 MG/DL
MICROALBUMIN/CREAT UR-RTO: 571 MG/G (ref 0–30)
PHOSPHORUS: 4.4 MG/DL (ref 2.5–4.9)
POTASSIUM SERPL-SCNC: 5 MMOL/L (ref 3.5–5.1)
SODIUM BLD-SCNC: 141 MMOL/L (ref 136–145)

## 2023-05-03 DIAGNOSIS — I10 HYPERTENSION, UNSPECIFIED TYPE: ICD-10-CM

## 2023-05-03 DIAGNOSIS — E11.69 TYPE 2 DIABETES MELLITUS WITH OTHER SPECIFIED COMPLICATION, UNSPECIFIED WHETHER LONG TERM INSULIN USE (HCC): ICD-10-CM

## 2023-05-03 DIAGNOSIS — N18.30 STAGE 3 CHRONIC KIDNEY DISEASE, UNSPECIFIED WHETHER STAGE 3A OR 3B CKD (HCC): ICD-10-CM

## 2023-05-04 LAB
ALBUMIN SERPL-MCNC: 4.1 G/DL (ref 3.4–5)
ANION GAP SERPL CALCULATED.3IONS-SCNC: 11 MMOL/L (ref 3–16)
BASOPHILS # BLD: 0 K/UL (ref 0–0.2)
BASOPHILS NFR BLD: 0.4 %
BUN SERPL-MCNC: 21 MG/DL (ref 7–20)
CALCIUM SERPL-MCNC: 8.9 MG/DL (ref 8.3–10.6)
CHLORIDE SERPL-SCNC: 105 MMOL/L (ref 99–110)
CO2 SERPL-SCNC: 22 MMOL/L (ref 21–32)
CREAT SERPL-MCNC: 1.4 MG/DL (ref 0.6–1.2)
CREAT UR-MCNC: 93.3 MG/DL (ref 28–259)
DEPRECATED RDW RBC AUTO: 13.1 % (ref 12.4–15.4)
EOSINOPHIL # BLD: 0 K/UL (ref 0–0.6)
EOSINOPHIL NFR BLD: 0.8 %
GFR SERPLBLD CREATININE-BSD FMLA CKD-EPI: 37 ML/MIN/{1.73_M2}
GLUCOSE SERPL-MCNC: 134 MG/DL (ref 70–99)
HCT VFR BLD AUTO: 40.7 % (ref 36–48)
HGB BLD-MCNC: 13.4 G/DL (ref 12–16)
LYMPHOCYTES # BLD: 2.1 K/UL (ref 1–5.1)
LYMPHOCYTES NFR BLD: 34 %
MCH RBC QN AUTO: 31 PG (ref 26–34)
MCHC RBC AUTO-ENTMCNC: 32.8 G/DL (ref 31–36)
MCV RBC AUTO: 94.5 FL (ref 80–100)
MICROALBUMIN UR DL<=1MG/L-MCNC: 50.6 MG/DL
MICROALBUMIN/CREAT UR: 542.3 MG/G (ref 0–30)
MONOCYTES # BLD: 0.4 K/UL (ref 0–1.3)
MONOCYTES NFR BLD: 7.2 %
NEUTROPHILS # BLD: 3.6 K/UL (ref 1.7–7.7)
NEUTROPHILS NFR BLD: 57.6 %
PHOSPHATE SERPL-MCNC: 3.8 MG/DL (ref 2.5–4.9)
PLATELET # BLD AUTO: 269 K/UL (ref 135–450)
PMV BLD AUTO: 8.9 FL (ref 5–10.5)
POTASSIUM SERPL-SCNC: 4.4 MMOL/L (ref 3.5–5.1)
RBC # BLD AUTO: 4.31 M/UL (ref 4–5.2)
SODIUM SERPL-SCNC: 138 MMOL/L (ref 136–145)
WBC # BLD AUTO: 6.2 K/UL (ref 4–11)

## 2023-09-22 DIAGNOSIS — N18.30 STAGE 3 CHRONIC KIDNEY DISEASE, UNSPECIFIED WHETHER STAGE 3A OR 3B CKD (HCC): ICD-10-CM

## 2023-09-23 LAB
ALBUMIN SERPL-MCNC: 4.1 G/DL (ref 3.4–5)
ANION GAP SERPL CALCULATED.3IONS-SCNC: 12 MMOL/L (ref 3–16)
BUN SERPL-MCNC: 27 MG/DL (ref 7–20)
CALCIUM SERPL-MCNC: 8.6 MG/DL (ref 8.3–10.6)
CHLORIDE SERPL-SCNC: 102 MMOL/L (ref 99–110)
CO2 SERPL-SCNC: 23 MMOL/L (ref 21–32)
CREAT SERPL-MCNC: 1.6 MG/DL (ref 0.6–1.2)
CREAT UR-MCNC: 153.5 MG/DL (ref 28–259)
GFR SERPLBLD CREATININE-BSD FMLA CKD-EPI: 32 ML/MIN/{1.73_M2}
GLUCOSE SERPL-MCNC: 313 MG/DL (ref 70–99)
MICROALBUMIN UR DL<=1MG/L-MCNC: 155.4 MG/DL
MICROALBUMIN/CREAT UR: 1012.4 MG/G (ref 0–30)
PHOSPHATE SERPL-MCNC: 4.1 MG/DL (ref 2.5–4.9)
POTASSIUM SERPL-SCNC: 4.5 MMOL/L (ref 3.5–5.1)
SODIUM SERPL-SCNC: 137 MMOL/L (ref 136–145)

## 2024-07-30 ENCOUNTER — OFFICE VISIT (OUTPATIENT)
Dept: ENDOCRINOLOGY | Age: 84
End: 2024-07-30
Payer: MEDICARE

## 2024-07-30 VITALS
HEIGHT: 66 IN | RESPIRATION RATE: 15 BRPM | BODY MASS INDEX: 29.09 KG/M2 | WEIGHT: 181 LBS | DIASTOLIC BLOOD PRESSURE: 82 MMHG | HEART RATE: 78 BPM | SYSTOLIC BLOOD PRESSURE: 134 MMHG | OXYGEN SATURATION: 96 %

## 2024-07-30 DIAGNOSIS — E11.65 UNCONTROLLED TYPE 2 DIABETES MELLITUS WITH HYPERGLYCEMIA (HCC): Primary | ICD-10-CM

## 2024-07-30 PROCEDURE — 99204 OFFICE O/P NEW MOD 45 MIN: CPT | Performed by: INTERNAL MEDICINE

## 2024-07-30 PROCEDURE — 1123F ACP DISCUSS/DSCN MKR DOCD: CPT | Performed by: INTERNAL MEDICINE

## 2024-07-30 PROCEDURE — 83036 HEMOGLOBIN GLYCOSYLATED A1C: CPT | Performed by: INTERNAL MEDICINE

## 2024-07-30 PROCEDURE — 3075F SYST BP GE 130 - 139MM HG: CPT | Performed by: INTERNAL MEDICINE

## 2024-07-30 PROCEDURE — G2211 COMPLEX E/M VISIT ADD ON: HCPCS | Performed by: INTERNAL MEDICINE

## 2024-07-30 PROCEDURE — 3079F DIAST BP 80-89 MM HG: CPT | Performed by: INTERNAL MEDICINE

## 2024-07-30 RX ORDER — SEMAGLUTIDE 1.34 MG/ML
INJECTION, SOLUTION SUBCUTANEOUS
Qty: 1.5 ML | Refills: 3 | Status: SHIPPED | OUTPATIENT
Start: 2024-07-30

## 2024-07-30 NOTE — PROGRESS NOTES
Seen as new patient for diabetes    Diagnosed with Type 2 diabetes mellitus 25 years    Known diabetic complications: none   Uncontrolled, moderate    Current diabetic medications     Tresiba  16 units  am  Humalog 2 for 50 > 150  SSI    On oral medication initially    Insulin give at assisted living  May be given after the meals    No h/o ozempic use    Last A1c  7.9%<----- 7.8%<---- 8 %    Prior visit with dietician: Yes   Current diet: on average, 2 meals per day   Current exercise: walking   Current monitoring regimen: home blood tests -    Uses CGM    100-200s    Occasional low glucose at night    Reports sometimes uses the scale, has low glucose after it if takes 12 units    Any episodes of hypoglycemia?   Worsened by high CHO    No Hx of CAD , PVD, CVA    Hyperlipidemia:   For   Years  Takes pravastatin  LDL 94 on     Hypertension for yrs  Stable  Takes Norvasc, hydralazine and lisinopril    Last eye exam:   Last foot exam:   Last microalbumin to creatinine ratio: She has CKD  Cr 2.04    She lives in assisted living, they administer insulin  Patient would like to administer herself    She has hypothyroidism, is on levothyroxine    Past Medical History:   Diagnosis Date    Almonte's esophagus without dysplasia     Depression     DM2 (diabetes mellitus, type 2) (HCC)     History of blood transfusion      with childbirth    Hyperlipidemia     Hypertension     Hypothyroid     Shingles      Past Surgical History:   Procedure Laterality Date    BLADDER SUSPENSION      CATARACT REMOVAL  2011    both     SECTION      CHOLECYSTECTOMY, LAPAROSCOPIC  10/17/2017    LAPAROSCOPIC CHOLECYSTECTOMY WITH CHOLANGIOGRAM    KNEE SURGERY  2011    left    LUMBAR LAMINECTOMY  3/2/15    microlumbar laminectomy L4-5 with c-arm    TONSILLECTOMY       Current Outpatient Medications   Medication Sig Dispense Refill    hydrALAZINE (APRESOLINE) 25 MG tablet Take 1 tablet by mouth 3 times daily 90 tablet 5

## 2024-08-26 ENCOUNTER — TELEPHONE (OUTPATIENT)
Dept: ENDOCRINOLOGY | Age: 84
End: 2024-08-26

## 2024-08-26 NOTE — TELEPHONE ENCOUNTER
PT's nurse practitioner, Any, called in asking about discontinuing the use of the ozempic. Pt is miserable sick been to the ER and she is trying to pin point what the issue could  be. Can you please reach out and let them know what needs to be done.   Any's # [phone]

## 2024-08-26 NOTE — TELEPHONE ENCOUNTER
Please advise it will be okay to hold the ozempic until gets better. We can treat with insulin in the meantime

## 2024-08-27 NOTE — TELEPHONE ENCOUNTER
SHERI- spoke to Any. She states she found out after leaving message yesterday that patient has not started Ozempic yet. She will hold off on starting until her GI issues have resolved.

## 2024-11-04 ENCOUNTER — TELEPHONE (OUTPATIENT)
Dept: ENDOCRINOLOGY | Age: 84
End: 2024-11-04

## 2024-11-04 NOTE — TELEPHONE ENCOUNTER
pt sister called wanting to let  know pt will not make appointment today stating pt is in the hospital had a heart attack put stint in her last month and then fell this month pt sister will call back and reschedule when she finds out when she will be released from rehab

## 2024-12-17 ENCOUNTER — OFFICE VISIT (OUTPATIENT)
Dept: ENT CLINIC | Age: 84
End: 2024-12-17
Payer: MEDICARE

## 2024-12-17 VITALS
BODY MASS INDEX: 25.78 KG/M2 | DIASTOLIC BLOOD PRESSURE: 90 MMHG | HEIGHT: 66 IN | HEART RATE: 72 BPM | TEMPERATURE: 98.1 F | WEIGHT: 160.4 LBS | SYSTOLIC BLOOD PRESSURE: 189 MMHG

## 2024-12-17 DIAGNOSIS — H90.3 SENSORINEURAL HEARING LOSS (SNHL) OF BOTH EARS: Primary | ICD-10-CM

## 2024-12-17 PROCEDURE — G8417 CALC BMI ABV UP PARAM F/U: HCPCS | Performed by: OTOLARYNGOLOGY

## 2024-12-17 PROCEDURE — G8484 FLU IMMUNIZE NO ADMIN: HCPCS | Performed by: OTOLARYNGOLOGY

## 2024-12-17 PROCEDURE — 1160F RVW MEDS BY RX/DR IN RCRD: CPT | Performed by: OTOLARYNGOLOGY

## 2024-12-17 PROCEDURE — G8427 DOCREV CUR MEDS BY ELIG CLIN: HCPCS | Performed by: OTOLARYNGOLOGY

## 2024-12-17 PROCEDURE — 3077F SYST BP >= 140 MM HG: CPT | Performed by: OTOLARYNGOLOGY

## 2024-12-17 PROCEDURE — 1159F MED LIST DOCD IN RCRD: CPT | Performed by: OTOLARYNGOLOGY

## 2024-12-17 PROCEDURE — 99203 OFFICE O/P NEW LOW 30 MIN: CPT | Performed by: OTOLARYNGOLOGY

## 2024-12-17 PROCEDURE — G8400 PT W/DXA NO RESULTS DOC: HCPCS | Performed by: OTOLARYNGOLOGY

## 2024-12-17 PROCEDURE — 1123F ACP DISCUSS/DSCN MKR DOCD: CPT | Performed by: OTOLARYNGOLOGY

## 2024-12-17 PROCEDURE — 3079F DIAST BP 80-89 MM HG: CPT | Performed by: OTOLARYNGOLOGY

## 2024-12-17 PROCEDURE — 1036F TOBACCO NON-USER: CPT | Performed by: OTOLARYNGOLOGY

## 2024-12-17 PROCEDURE — 1090F PRES/ABSN URINE INCON ASSESS: CPT | Performed by: OTOLARYNGOLOGY

## 2024-12-18 NOTE — PROGRESS NOTES
CHIEF COMPLAINT: Hearing loss    HISTORY OF PRESENT ILLNESS:  84 y.o. female who presents with hearing loss, longstanding.  It is bilateral.  No ear pain or otorrhea.  The patient's ears do not feel full.  She has had hearing aids for several years which she got at [employer].    PAST MEDICAL HISTORY:   Social History     Tobacco Use   Smoking Status Never   Smokeless Tobacco Never                                                    Social History     Substance and Sexual Activity   Alcohol Use Not Currently                                                    Current Outpatient Medications:     meclizine (ANTIVERT) 25 MG tablet, Take 1 tablet by mouth daily as needed, Disp: , Rfl:     hydrALAZINE (APRESOLINE) 25 MG tablet, Take 1 tablet by mouth 3 times daily, Disp: 90 tablet, Rfl: 5    amLODIPine (NORVASC) 5 MG tablet, Take 1 tablet by mouth in the morning and at bedtime, Disp: 180 tablet, Rfl: 5    acetaminophen (TYLENOL) 325 MG tablet, Take 2 tablets by mouth every 6 hours as needed, Disp: , Rfl:     omeprazole (PRILOSEC) 20 MG delayed release capsule, Take 1 capsule by mouth daily, Disp: 30 capsule, Rfl: 0    levothyroxine (SYNTHROID) 125 MCG tablet, Take 1 tablet by mouth Daily, Disp: 30 tablet, Rfl: 0    traZODone (DESYREL) 50 MG tablet, Take 3 tablets by mouth daily, Disp: 90 tablet, Rfl: 0    Insulin Degludec (TRESIBA FLEXTOUCH) 100 UNIT/ML SOPN, 16 Units every morning, Disp: , Rfl:     METAMUCIL FIBER PO, Take by mouth, Disp: , Rfl:     NOVOFINE AUTOCOVER 30G X 8 MM MISC, USE 1 DAILY, Disp: 100 each, Rfl: 3    insulin lispro, 1 Unit Dial, (HUMALOG/ADMELOG) 100 UNIT/ML SOPN, Inject 25-30 Units into the skin 3 times daily (before meals) Per Home Sliding scale, Disp: , Rfl:     citalopram (CELEXA) 10 MG tablet, Take 1 tablet by mouth daily, Disp: 30 tablet, Rfl: 3    pravastatin (PRAVACHOL) 80 MG tablet, TAKE 1 TABLET BY MOUTH DAILY, Disp: 90 tablet, Rfl: 0    mirtazapine (REMERON) 15 MG tablet, TAKE 1/2 TABLET BY

## 2025-01-30 ENCOUNTER — TELEPHONE (OUTPATIENT)
Dept: ENDOCRINOLOGY | Age: 85
End: 2025-01-30

## 2025-01-30 NOTE — TELEPHONE ENCOUNTER
Spoke to елена, glucose is high.Patient is not having any symptoms. Advised to given humalog SSI, she will give 12 units per scale. She has not been on the humalog 5 units AC TID, advise to restart. Increase Tresiba dose to 20 units.

## 2025-01-30 NOTE — TELEPHONE ENCOUNTER
Sue pt care giver called stating pt BS is running high it wont give a # just states high asking if she can increase tresiba from 16 units to 20 units please contact Sue at the number below pt has not yet taken her morning insulin      [phone]

## 2025-04-14 ENCOUNTER — OFFICE VISIT (OUTPATIENT)
Dept: ENDOCRINOLOGY | Age: 85
End: 2025-04-14
Payer: MEDICARE

## 2025-04-14 VITALS
SYSTOLIC BLOOD PRESSURE: 210 MMHG | HEART RATE: 69 BPM | BODY MASS INDEX: 28.42 KG/M2 | DIASTOLIC BLOOD PRESSURE: 88 MMHG | HEIGHT: 66 IN | WEIGHT: 176.8 LBS

## 2025-04-14 DIAGNOSIS — E11.65 UNCONTROLLED TYPE 2 DIABETES MELLITUS WITH HYPERGLYCEMIA (HCC): Primary | ICD-10-CM

## 2025-04-14 DIAGNOSIS — I10 PRIMARY HYPERTENSION: ICD-10-CM

## 2025-04-14 LAB — HBA1C MFR BLD: 9.1 %

## 2025-04-14 PROCEDURE — 3077F SYST BP >= 140 MM HG: CPT | Performed by: INTERNAL MEDICINE

## 2025-04-14 PROCEDURE — 1123F ACP DISCUSS/DSCN MKR DOCD: CPT | Performed by: INTERNAL MEDICINE

## 2025-04-14 PROCEDURE — 1159F MED LIST DOCD IN RCRD: CPT | Performed by: INTERNAL MEDICINE

## 2025-04-14 PROCEDURE — 83036 HEMOGLOBIN GLYCOSYLATED A1C: CPT | Performed by: INTERNAL MEDICINE

## 2025-04-14 PROCEDURE — 1036F TOBACCO NON-USER: CPT | Performed by: INTERNAL MEDICINE

## 2025-04-14 PROCEDURE — G8417 CALC BMI ABV UP PARAM F/U: HCPCS | Performed by: INTERNAL MEDICINE

## 2025-04-14 PROCEDURE — G8400 PT W/DXA NO RESULTS DOC: HCPCS | Performed by: INTERNAL MEDICINE

## 2025-04-14 PROCEDURE — 99214 OFFICE O/P EST MOD 30 MIN: CPT | Performed by: INTERNAL MEDICINE

## 2025-04-14 PROCEDURE — 3079F DIAST BP 80-89 MM HG: CPT | Performed by: INTERNAL MEDICINE

## 2025-04-14 PROCEDURE — G8427 DOCREV CUR MEDS BY ELIG CLIN: HCPCS | Performed by: INTERNAL MEDICINE

## 2025-04-14 PROCEDURE — G2211 COMPLEX E/M VISIT ADD ON: HCPCS | Performed by: INTERNAL MEDICINE

## 2025-04-14 PROCEDURE — 1090F PRES/ABSN URINE INCON ASSESS: CPT | Performed by: INTERNAL MEDICINE

## 2025-04-14 NOTE — PROGRESS NOTES
Seen as  patient for diabetes    Interim:      DKA, NSTEMI  Glucose in 900s per records  Took ozempic  States worked well  Not sure why stopped    Diagnosed with Type 2 diabetes mellitus 25 years    Known diabetic complications: none   Uncontrolled, moderate    Current diabetic medications     Tresiba 20  units  am  Humalog      1 for 50 > 150  SSI    On oral medication initially    Insulin give at assisted living  May be given after the meals    No h/o ozempic use    Last A1c 9.1%<---- 7.9%<----- 7.8%<---- 8 %    Prior visit with dietician: Yes   Current diet: on average, 2 meals per day   Current exercise: walking   Current monitoring regimen: home blood tests -    Uses CGM    Did not bring reader        Occasional low glucose at night    Reports sometimes uses the scale, has low glucose after it if takes 12 units    Any episodes of hypoglycemia?   Worsened by high CHO    No Hx of CAD , PVD, CVA    Hyperlipidemia:   For   Years  Takes pravastatin  LDL 94 on     Hypertension for yrs  Stable  Takes hydralazine 10mg TID   Metoprolol 25mg BID  Aldactone 25mg half tablet daily    Last eye exam:   Last foot exam:   Last microalbumin to creatinine ratio: She has CKD  Cr 2.24  Dr. Bland    She lives in assisted living, they administer insulin  Patient would like to administer herself    She has hypothyroidism, is on levothyroxine    Past Medical History:   Diagnosis Date    Allergic rhinitis     Almonte's esophagus without dysplasia     Depression     Dizziness     DM2 (diabetes mellitus, type 2) (HCC)     GERD (gastroesophageal reflux disease)     Hearing loss     History of blood transfusion      with childbirth    Hyperlipidemia     Hypertension     Hypothyroid     Rash     Shingles      Past Surgical History:   Procedure Laterality Date    BLADDER SUSPENSION      CATARACT REMOVAL  2011    both     SECTION      CHOLECYSTECTOMY, LAPAROSCOPIC  10/17/2017    LAPAROSCOPIC CHOLECYSTECTOMY

## 2025-06-18 DIAGNOSIS — E11.65 UNCONTROLLED TYPE 2 DIABETES MELLITUS WITH HYPERGLYCEMIA (HCC): Primary | ICD-10-CM

## 2025-06-18 RX ORDER — SEMAGLUTIDE 0.68 MG/ML
INJECTION, SOLUTION SUBCUTANEOUS
Qty: 3 ML | Refills: 0 | Status: SHIPPED | OUTPATIENT
Start: 2025-06-18

## 2025-07-14 ENCOUNTER — TELEPHONE (OUTPATIENT)
Dept: ENDOCRINOLOGY | Age: 85
End: 2025-07-14

## 2025-07-14 ENCOUNTER — TELEMEDICINE (OUTPATIENT)
Dept: ENDOCRINOLOGY | Age: 85
End: 2025-07-14
Payer: MEDICARE

## 2025-07-14 DIAGNOSIS — E11.65 UNCONTROLLED TYPE 2 DIABETES MELLITUS WITH HYPERGLYCEMIA (HCC): Primary | ICD-10-CM

## 2025-07-14 PROCEDURE — 1123F ACP DISCUSS/DSCN MKR DOCD: CPT | Performed by: INTERNAL MEDICINE

## 2025-07-14 PROCEDURE — 1159F MED LIST DOCD IN RCRD: CPT | Performed by: INTERNAL MEDICINE

## 2025-07-14 PROCEDURE — G8427 DOCREV CUR MEDS BY ELIG CLIN: HCPCS | Performed by: INTERNAL MEDICINE

## 2025-07-14 PROCEDURE — G2211 COMPLEX E/M VISIT ADD ON: HCPCS | Performed by: INTERNAL MEDICINE

## 2025-07-14 PROCEDURE — 99214 OFFICE O/P EST MOD 30 MIN: CPT | Performed by: INTERNAL MEDICINE

## 2025-07-14 PROCEDURE — G8400 PT W/DXA NO RESULTS DOC: HCPCS | Performed by: INTERNAL MEDICINE

## 2025-07-14 PROCEDURE — 3046F HEMOGLOBIN A1C LEVEL >9.0%: CPT | Performed by: INTERNAL MEDICINE

## 2025-07-14 PROCEDURE — 1090F PRES/ABSN URINE INCON ASSESS: CPT | Performed by: INTERNAL MEDICINE

## 2025-07-14 NOTE — PROGRESS NOTES
Negative for back pain. No joint pain  Skin: Negative for itching and rash.   Neurological: Negative for dizziness. Negative for tingling, tremors, focal weakness and headaches.   Endo/Heme/Allergies: see HPI  Psychiatric/Behavioral: Negative for depression and substance abuse.         PHYSICAL EXAMINATION:  [ INSTRUCTIONS:  \"[x]\" Indicates a positive item  \"[]\" Indicates a negative item  -- DELETE ALL ITEMS NOT EXAMINED]  Vital Signs: (As obtained by patient/caregiver or practitioner observation)    Blood pressure-  Heart rate-    Respiratory rate-    Temperature-  Pulse oximetry-     Constitutional Appears well-developed and well-nourished No apparent distress        Mental status  Alert and awake  Oriented to person/place/time  Able to follow commands      Eyes:  EOM    [x]  Normal    Sclera  [x]  Normal           Discharge [x]  None visible      HENT:   [x] Normocephalic, atraumatic.    [x] Mouth/Throat: Mucous membranes are moist.     External Ears [x] Normal  no discharge    Neck: [x] No visualized mass  no swelling    Pulmonary/Chest: [x] Respiratory effort normal.  [x] No visualized signs of difficulty breathing or respiratory distress             Musculoskeletal:   [x] Normal gait with no signs of ataxia         [x] Normal range of motion of neck          Head and neck stable, appears normal ROM, strength good    Neurological:        [x] No Facial Asymmetry (Cranial nerve 7 motor function) (limited exam to video visit)          [x] No gaze palsy                Skin:        [x] No significant exanthematous lesions or discoloration noted on facial skin                 Psychiatric:       [x] Normal Affect [x] No Hallucinations            Other pertinent observable physical exam findings-     Due to this being a TeleHealth encounter, evaluation of the following organ systems is limited: Vitals/Constitutional/EENT/Resp/CV/GI//MS/Neuro/Skin/Heme-Lymph-Imm.      Services were provided through a video synchronous

## 2025-07-14 NOTE — TELEPHONE ENCOUNTER
Pt daughter called we need to fax the new dosage of the ozempic to Luis fax [phone] att nurse of abhi gee